# Patient Record
Sex: MALE | Race: WHITE | Employment: OTHER | URBAN - METROPOLITAN AREA
[De-identification: names, ages, dates, MRNs, and addresses within clinical notes are randomized per-mention and may not be internally consistent; named-entity substitution may affect disease eponyms.]

---

## 2017-01-12 RX ORDER — LISINOPRIL 10 MG/1
10 TABLET ORAL DAILY
Qty: 90 TABLET | Refills: 0 | Status: SHIPPED | OUTPATIENT
Start: 2017-01-12 | End: 2017-04-20 | Stop reason: SDUPTHER

## 2017-03-10 ENCOUNTER — NURSE ONLY (OUTPATIENT)
Dept: FAMILY MEDICINE CLINIC | Age: 67
End: 2017-03-10

## 2017-03-10 ENCOUNTER — OFFICE VISIT (OUTPATIENT)
Dept: FAMILY MEDICINE CLINIC | Age: 67
End: 2017-03-10

## 2017-03-10 VITALS
WEIGHT: 262 LBS | DIASTOLIC BLOOD PRESSURE: 88 MMHG | SYSTOLIC BLOOD PRESSURE: 124 MMHG | BODY MASS INDEX: 33.64 KG/M2 | OXYGEN SATURATION: 98 % | HEART RATE: 68 BPM

## 2017-03-10 DIAGNOSIS — M25.311 ROTATOR CUFF INSUFFICIENCY OF RIGHT SHOULDER: Primary | ICD-10-CM

## 2017-03-10 DIAGNOSIS — R05.9 COUGH: ICD-10-CM

## 2017-03-10 PROCEDURE — 4040F PNEUMOC VAC/ADMIN/RCVD: CPT | Performed by: FAMILY MEDICINE

## 2017-03-10 PROCEDURE — 1123F ACP DISCUSS/DSCN MKR DOCD: CPT | Performed by: FAMILY MEDICINE

## 2017-03-10 PROCEDURE — G8417 CALC BMI ABV UP PARAM F/U: HCPCS | Performed by: FAMILY MEDICINE

## 2017-03-10 PROCEDURE — 3017F COLORECTAL CA SCREEN DOC REV: CPT | Performed by: FAMILY MEDICINE

## 2017-03-10 PROCEDURE — G8427 DOCREV CUR MEDS BY ELIG CLIN: HCPCS | Performed by: FAMILY MEDICINE

## 2017-03-10 PROCEDURE — 99214 OFFICE O/P EST MOD 30 MIN: CPT | Performed by: FAMILY MEDICINE

## 2017-03-10 PROCEDURE — 71020 XR CHEST STANDARD TWO VW: CPT | Performed by: FAMILY MEDICINE

## 2017-03-10 PROCEDURE — G8484 FLU IMMUNIZE NO ADMIN: HCPCS | Performed by: FAMILY MEDICINE

## 2017-03-10 PROCEDURE — 1036F TOBACCO NON-USER: CPT | Performed by: FAMILY MEDICINE

## 2017-03-10 RX ORDER — OMEPRAZOLE 40 MG/1
40 CAPSULE, DELAYED RELEASE ORAL DAILY
Qty: 30 CAPSULE | Refills: 3 | Status: SHIPPED | OUTPATIENT
Start: 2017-03-10 | End: 2017-03-30 | Stop reason: ALTCHOICE

## 2017-03-11 ASSESSMENT — ENCOUNTER SYMPTOMS
EYES NEGATIVE: 1
CHEST TIGHTNESS: 0
SHORTNESS OF BREATH: 0
APNEA: 0
GASTROINTESTINAL NEGATIVE: 1
WHEEZING: 0
CHOKING: 0
COUGH: 1
STRIDOR: 0

## 2017-03-30 ENCOUNTER — OFFICE VISIT (OUTPATIENT)
Dept: ORTHOPEDIC SURGERY | Age: 67
End: 2017-03-30

## 2017-03-30 VITALS
SYSTOLIC BLOOD PRESSURE: 124 MMHG | HEART RATE: 78 BPM | HEIGHT: 74 IN | WEIGHT: 254 LBS | BODY MASS INDEX: 32.6 KG/M2 | DIASTOLIC BLOOD PRESSURE: 82 MMHG

## 2017-03-30 DIAGNOSIS — M75.121 COMPLETE TEAR OF RIGHT ROTATOR CUFF: ICD-10-CM

## 2017-03-30 DIAGNOSIS — S49.91XA RIGHT SHOULDER INJURY, INITIAL ENCOUNTER: Primary | ICD-10-CM

## 2017-03-30 PROCEDURE — 3017F COLORECTAL CA SCREEN DOC REV: CPT | Performed by: ORTHOPAEDIC SURGERY

## 2017-03-30 PROCEDURE — G8427 DOCREV CUR MEDS BY ELIG CLIN: HCPCS | Performed by: ORTHOPAEDIC SURGERY

## 2017-03-30 PROCEDURE — 1123F ACP DISCUSS/DSCN MKR DOCD: CPT | Performed by: ORTHOPAEDIC SURGERY

## 2017-03-30 PROCEDURE — 73030 X-RAY EXAM OF SHOULDER: CPT | Performed by: ORTHOPAEDIC SURGERY

## 2017-03-30 PROCEDURE — G8417 CALC BMI ABV UP PARAM F/U: HCPCS | Performed by: ORTHOPAEDIC SURGERY

## 2017-03-30 PROCEDURE — 1036F TOBACCO NON-USER: CPT | Performed by: ORTHOPAEDIC SURGERY

## 2017-03-30 PROCEDURE — G8484 FLU IMMUNIZE NO ADMIN: HCPCS | Performed by: ORTHOPAEDIC SURGERY

## 2017-03-30 PROCEDURE — 4040F PNEUMOC VAC/ADMIN/RCVD: CPT | Performed by: ORTHOPAEDIC SURGERY

## 2017-03-30 PROCEDURE — 99203 OFFICE O/P NEW LOW 30 MIN: CPT | Performed by: ORTHOPAEDIC SURGERY

## 2017-04-03 RX ORDER — ATORVASTATIN CALCIUM 20 MG/1
TABLET, FILM COATED ORAL
Qty: 90 TABLET | Refills: 0 | Status: SHIPPED | OUTPATIENT
Start: 2017-04-03 | End: 2017-06-27 | Stop reason: SDUPTHER

## 2017-04-06 ENCOUNTER — OFFICE VISIT (OUTPATIENT)
Dept: ORTHOPEDIC SURGERY | Age: 67
End: 2017-04-06

## 2017-04-06 VITALS
HEART RATE: 83 BPM | BODY MASS INDEX: 32.59 KG/M2 | DIASTOLIC BLOOD PRESSURE: 83 MMHG | HEIGHT: 74 IN | SYSTOLIC BLOOD PRESSURE: 116 MMHG | WEIGHT: 253.97 LBS

## 2017-04-06 DIAGNOSIS — M75.121 COMPLETE TEAR OF RIGHT ROTATOR CUFF: Primary | ICD-10-CM

## 2017-04-06 PROCEDURE — G8427 DOCREV CUR MEDS BY ELIG CLIN: HCPCS | Performed by: ORTHOPAEDIC SURGERY

## 2017-04-06 PROCEDURE — 99213 OFFICE O/P EST LOW 20 MIN: CPT | Performed by: ORTHOPAEDIC SURGERY

## 2017-04-06 PROCEDURE — G8417 CALC BMI ABV UP PARAM F/U: HCPCS | Performed by: ORTHOPAEDIC SURGERY

## 2017-04-06 PROCEDURE — 1036F TOBACCO NON-USER: CPT | Performed by: ORTHOPAEDIC SURGERY

## 2017-04-06 PROCEDURE — 4040F PNEUMOC VAC/ADMIN/RCVD: CPT | Performed by: ORTHOPAEDIC SURGERY

## 2017-04-06 PROCEDURE — 3017F COLORECTAL CA SCREEN DOC REV: CPT | Performed by: ORTHOPAEDIC SURGERY

## 2017-04-06 PROCEDURE — 1123F ACP DISCUSS/DSCN MKR DOCD: CPT | Performed by: ORTHOPAEDIC SURGERY

## 2017-04-20 RX ORDER — LISINOPRIL 10 MG/1
TABLET ORAL
Qty: 90 TABLET | Refills: 0 | Status: SHIPPED | OUTPATIENT
Start: 2017-04-20 | End: 2017-07-16 | Stop reason: SDUPTHER

## 2017-04-24 ENCOUNTER — HOSPITAL ENCOUNTER (OUTPATIENT)
Dept: PHYSICAL THERAPY | Age: 67
Discharge: HOME | End: 2017-04-30
Admitting: ORTHOPAEDIC SURGERY

## 2017-04-26 ENCOUNTER — HOSPITAL ENCOUNTER (OUTPATIENT)
Dept: PHYSICAL THERAPY | Age: 67
Discharge: HOME | End: 2017-04-27
Admitting: ORTHOPAEDIC SURGERY

## 2017-05-01 ENCOUNTER — HOSPITAL ENCOUNTER (OUTPATIENT)
Dept: PHYSICAL THERAPY | Age: 67
Discharge: HOME | End: 2017-05-01
Admitting: ORTHOPAEDIC SURGERY

## 2017-05-03 ENCOUNTER — HOSPITAL ENCOUNTER (OUTPATIENT)
Dept: PHYSICAL THERAPY | Age: 67
Discharge: HOME | End: 2017-05-03
Admitting: ORTHOPAEDIC SURGERY

## 2017-05-11 ENCOUNTER — HOSPITAL ENCOUNTER (OUTPATIENT)
Dept: PHYSICAL THERAPY | Age: 67
Discharge: HOME | End: 2017-05-11
Admitting: ORTHOPAEDIC SURGERY

## 2017-05-15 ENCOUNTER — HOSPITAL ENCOUNTER (OUTPATIENT)
Dept: PHYSICAL THERAPY | Age: 67
Discharge: HOME | End: 2017-05-15
Admitting: ORTHOPAEDIC SURGERY

## 2017-05-17 ENCOUNTER — OFFICE VISIT (OUTPATIENT)
Dept: ORTHOPEDIC SURGERY | Age: 67
End: 2017-05-17

## 2017-05-17 VITALS
SYSTOLIC BLOOD PRESSURE: 132 MMHG | HEART RATE: 80 BPM | WEIGHT: 253 LBS | DIASTOLIC BLOOD PRESSURE: 92 MMHG | BODY MASS INDEX: 32.47 KG/M2 | HEIGHT: 74 IN

## 2017-05-17 DIAGNOSIS — M75.111 INCOMPLETE TEAR OF RIGHT ROTATOR CUFF: Primary | ICD-10-CM

## 2017-05-17 PROCEDURE — 1123F ACP DISCUSS/DSCN MKR DOCD: CPT | Performed by: ORTHOPAEDIC SURGERY

## 2017-05-17 PROCEDURE — 4040F PNEUMOC VAC/ADMIN/RCVD: CPT | Performed by: ORTHOPAEDIC SURGERY

## 2017-05-17 PROCEDURE — 1036F TOBACCO NON-USER: CPT | Performed by: ORTHOPAEDIC SURGERY

## 2017-05-17 PROCEDURE — G8427 DOCREV CUR MEDS BY ELIG CLIN: HCPCS | Performed by: ORTHOPAEDIC SURGERY

## 2017-05-17 PROCEDURE — G8417 CALC BMI ABV UP PARAM F/U: HCPCS | Performed by: ORTHOPAEDIC SURGERY

## 2017-05-17 PROCEDURE — 99213 OFFICE O/P EST LOW 20 MIN: CPT | Performed by: ORTHOPAEDIC SURGERY

## 2017-05-17 PROCEDURE — 3017F COLORECTAL CA SCREEN DOC REV: CPT | Performed by: ORTHOPAEDIC SURGERY

## 2017-06-27 ENCOUNTER — TELEPHONE (OUTPATIENT)
Dept: FAMILY MEDICINE CLINIC | Age: 67
End: 2017-06-27

## 2017-06-27 RX ORDER — ATORVASTATIN CALCIUM 20 MG/1
20 TABLET, FILM COATED ORAL DAILY
Qty: 90 TABLET | Refills: 0 | Status: SHIPPED | OUTPATIENT
Start: 2017-06-27 | End: 2017-10-04 | Stop reason: SDUPTHER

## 2017-07-17 RX ORDER — LISINOPRIL 10 MG/1
TABLET ORAL
Qty: 90 TABLET | Refills: 0 | Status: SHIPPED | OUTPATIENT
Start: 2017-07-17 | End: 2017-10-21 | Stop reason: SDUPTHER

## 2017-08-15 ENCOUNTER — OFFICE VISIT (OUTPATIENT)
Dept: FAMILY MEDICINE CLINIC | Age: 67
End: 2017-08-15

## 2017-08-15 VITALS
BODY MASS INDEX: 30.17 KG/M2 | WEIGHT: 235 LBS | SYSTOLIC BLOOD PRESSURE: 142 MMHG | HEART RATE: 70 BPM | DIASTOLIC BLOOD PRESSURE: 86 MMHG

## 2017-08-15 DIAGNOSIS — I10 ESSENTIAL HYPERTENSION: Primary | Chronic | ICD-10-CM

## 2017-08-15 DIAGNOSIS — G47.33 OBSTRUCTIVE SLEEP APNEA SYNDROME: Chronic | ICD-10-CM

## 2017-08-15 DIAGNOSIS — E78.00 HYPERCHOLESTEROLEMIA: Chronic | ICD-10-CM

## 2017-08-15 PROCEDURE — 99213 OFFICE O/P EST LOW 20 MIN: CPT | Performed by: FAMILY MEDICINE

## 2017-08-15 PROCEDURE — 4040F PNEUMOC VAC/ADMIN/RCVD: CPT | Performed by: FAMILY MEDICINE

## 2017-08-15 PROCEDURE — 1036F TOBACCO NON-USER: CPT | Performed by: FAMILY MEDICINE

## 2017-08-15 PROCEDURE — 1123F ACP DISCUSS/DSCN MKR DOCD: CPT | Performed by: FAMILY MEDICINE

## 2017-08-15 PROCEDURE — 3017F COLORECTAL CA SCREEN DOC REV: CPT | Performed by: FAMILY MEDICINE

## 2017-08-15 PROCEDURE — G8427 DOCREV CUR MEDS BY ELIG CLIN: HCPCS | Performed by: FAMILY MEDICINE

## 2017-08-15 PROCEDURE — G8417 CALC BMI ABV UP PARAM F/U: HCPCS | Performed by: FAMILY MEDICINE

## 2017-08-16 LAB
ALBUMIN SERPL-MCNC: 4.2 G/DL (ref 3.4–5)
ALP BLD-CCNC: 42 U/L (ref 40–129)
ALT SERPL-CCNC: 9 U/L (ref 10–40)
ANION GAP SERPL CALCULATED.3IONS-SCNC: 12 MMOL/L (ref 3–16)
AST SERPL-CCNC: 14 U/L (ref 15–37)
BILIRUB SERPL-MCNC: 0.9 MG/DL (ref 0–1)
BILIRUBIN DIRECT: <0.2 MG/DL (ref 0–0.3)
BILIRUBIN, INDIRECT: ABNORMAL MG/DL (ref 0–1)
BUN BLDV-MCNC: 19 MG/DL (ref 7–20)
CALCIUM SERPL-MCNC: 9.5 MG/DL (ref 8.3–10.6)
CHLORIDE BLD-SCNC: 102 MMOL/L (ref 99–110)
CHOLESTEROL, TOTAL: 127 MG/DL (ref 0–199)
CO2: 24 MMOL/L (ref 21–32)
CREAT SERPL-MCNC: 0.9 MG/DL (ref 0.8–1.3)
GFR AFRICAN AMERICAN: >60
GFR NON-AFRICAN AMERICAN: >60
GLUCOSE BLD-MCNC: 89 MG/DL (ref 70–99)
HCT VFR BLD CALC: 44.7 % (ref 40.5–52.5)
HDLC SERPL-MCNC: 61 MG/DL (ref 40–60)
HEMOGLOBIN: 14.7 G/DL (ref 13.5–17.5)
LDL CHOLESTEROL CALCULATED: 56 MG/DL
MCH RBC QN AUTO: 29.8 PG (ref 26–34)
MCHC RBC AUTO-ENTMCNC: 32.9 G/DL (ref 31–36)
MCV RBC AUTO: 90.6 FL (ref 80–100)
PDW BLD-RTO: 14.1 % (ref 12.4–15.4)
PLATELET # BLD: 240 K/UL (ref 135–450)
PMV BLD AUTO: 7.9 FL (ref 5–10.5)
POTASSIUM SERPL-SCNC: 4.3 MMOL/L (ref 3.5–5.1)
RBC # BLD: 4.94 M/UL (ref 4.2–5.9)
SODIUM BLD-SCNC: 138 MMOL/L (ref 136–145)
TOTAL PROTEIN: 6.8 G/DL (ref 6.4–8.2)
TRIGL SERPL-MCNC: 51 MG/DL (ref 0–150)
TSH SERPL DL<=0.05 MIU/L-ACNC: 2.21 UIU/ML (ref 0.27–4.2)
VLDLC SERPL CALC-MCNC: 10 MG/DL
WBC # BLD: 6.1 K/UL (ref 4–11)

## 2017-09-15 ENCOUNTER — OFFICE VISIT (OUTPATIENT)
Dept: FAMILY MEDICINE CLINIC | Age: 67
End: 2017-09-15

## 2017-09-15 VITALS
SYSTOLIC BLOOD PRESSURE: 142 MMHG | WEIGHT: 231.2 LBS | BODY MASS INDEX: 29.68 KG/M2 | DIASTOLIC BLOOD PRESSURE: 100 MMHG | TEMPERATURE: 97.6 F

## 2017-09-15 DIAGNOSIS — I10 ESSENTIAL HYPERTENSION: Primary | ICD-10-CM

## 2017-09-15 DIAGNOSIS — H92.01 EARACHE ON RIGHT: ICD-10-CM

## 2017-09-15 PROCEDURE — 99213 OFFICE O/P EST LOW 20 MIN: CPT | Performed by: FAMILY MEDICINE

## 2017-09-15 PROCEDURE — 1123F ACP DISCUSS/DSCN MKR DOCD: CPT | Performed by: FAMILY MEDICINE

## 2017-09-15 PROCEDURE — 1036F TOBACCO NON-USER: CPT | Performed by: FAMILY MEDICINE

## 2017-09-15 PROCEDURE — G8427 DOCREV CUR MEDS BY ELIG CLIN: HCPCS | Performed by: FAMILY MEDICINE

## 2017-09-15 PROCEDURE — 4040F PNEUMOC VAC/ADMIN/RCVD: CPT | Performed by: FAMILY MEDICINE

## 2017-09-15 PROCEDURE — 3017F COLORECTAL CA SCREEN DOC REV: CPT | Performed by: FAMILY MEDICINE

## 2017-09-15 PROCEDURE — G8417 CALC BMI ABV UP PARAM F/U: HCPCS | Performed by: FAMILY MEDICINE

## 2017-09-15 ASSESSMENT — PATIENT HEALTH QUESTIONNAIRE - PHQ9
1. LITTLE INTEREST OR PLEASURE IN DOING THINGS: 0
SUM OF ALL RESPONSES TO PHQ QUESTIONS 1-9: 0
SUM OF ALL RESPONSES TO PHQ9 QUESTIONS 1 & 2: 0
2. FEELING DOWN, DEPRESSED OR HOPELESS: 0

## 2017-09-15 ASSESSMENT — ENCOUNTER SYMPTOMS
EYES NEGATIVE: 1
APNEA: 1

## 2017-10-05 RX ORDER — ATORVASTATIN CALCIUM 20 MG/1
TABLET, FILM COATED ORAL
Qty: 90 TABLET | Refills: 0 | Status: SHIPPED | OUTPATIENT
Start: 2017-10-05 | End: 2018-01-04 | Stop reason: SDUPTHER

## 2017-10-23 RX ORDER — LISINOPRIL 10 MG/1
TABLET ORAL
Qty: 90 TABLET | Refills: 0 | Status: SHIPPED | OUTPATIENT
Start: 2017-10-23 | End: 2018-01-09 | Stop reason: SDUPTHER

## 2018-01-04 RX ORDER — ATORVASTATIN CALCIUM 20 MG/1
TABLET, FILM COATED ORAL
Qty: 90 TABLET | Refills: 2 | Status: SHIPPED | OUTPATIENT
Start: 2018-01-04 | End: 2018-09-25 | Stop reason: SDUPTHER

## 2018-01-09 RX ORDER — LISINOPRIL 10 MG/1
TABLET ORAL
Qty: 90 TABLET | Refills: 0 | Status: SHIPPED | OUTPATIENT
Start: 2018-01-09 | End: 2018-04-16 | Stop reason: SDUPTHER

## 2018-03-29 ENCOUNTER — NURSE ONLY (OUTPATIENT)
Dept: FAMILY MEDICINE CLINIC | Age: 68
End: 2018-03-29

## 2018-03-29 DIAGNOSIS — Z23 NEED FOR PNEUMOCOCCAL VACCINATION: Primary | ICD-10-CM

## 2018-03-29 PROCEDURE — 90670 PCV13 VACCINE IM: CPT | Performed by: FAMILY MEDICINE

## 2018-03-29 PROCEDURE — G0009 ADMIN PNEUMOCOCCAL VACCINE: HCPCS | Performed by: FAMILY MEDICINE

## 2018-04-19 ENCOUNTER — OFFICE VISIT (OUTPATIENT)
Dept: FAMILY MEDICINE CLINIC | Age: 68
End: 2018-04-19

## 2018-04-19 VITALS
HEART RATE: 77 BPM | WEIGHT: 240 LBS | BODY MASS INDEX: 30.81 KG/M2 | SYSTOLIC BLOOD PRESSURE: 130 MMHG | DIASTOLIC BLOOD PRESSURE: 90 MMHG

## 2018-04-19 DIAGNOSIS — I10 ESSENTIAL HYPERTENSION: Primary | Chronic | ICD-10-CM

## 2018-04-19 DIAGNOSIS — E78.00 HYPERCHOLESTEROLEMIA: Chronic | ICD-10-CM

## 2018-04-19 PROCEDURE — G8417 CALC BMI ABV UP PARAM F/U: HCPCS | Performed by: FAMILY MEDICINE

## 2018-04-19 PROCEDURE — 1123F ACP DISCUSS/DSCN MKR DOCD: CPT | Performed by: FAMILY MEDICINE

## 2018-04-19 PROCEDURE — 99213 OFFICE O/P EST LOW 20 MIN: CPT | Performed by: FAMILY MEDICINE

## 2018-04-19 PROCEDURE — 3017F COLORECTAL CA SCREEN DOC REV: CPT | Performed by: FAMILY MEDICINE

## 2018-04-19 PROCEDURE — G8427 DOCREV CUR MEDS BY ELIG CLIN: HCPCS | Performed by: FAMILY MEDICINE

## 2018-04-19 PROCEDURE — 4040F PNEUMOC VAC/ADMIN/RCVD: CPT | Performed by: FAMILY MEDICINE

## 2018-04-19 PROCEDURE — 1036F TOBACCO NON-USER: CPT | Performed by: FAMILY MEDICINE

## 2018-04-26 DIAGNOSIS — E78.00 HYPERCHOLESTEROLEMIA: Chronic | ICD-10-CM

## 2018-04-26 DIAGNOSIS — I10 ESSENTIAL HYPERTENSION: Chronic | ICD-10-CM

## 2018-04-26 LAB
ALBUMIN SERPL-MCNC: 4.1 G/DL (ref 3.4–5)
ALP BLD-CCNC: 50 U/L (ref 40–129)
ALT SERPL-CCNC: 10 U/L (ref 10–40)
ANION GAP SERPL CALCULATED.3IONS-SCNC: 13 MMOL/L (ref 3–16)
AST SERPL-CCNC: 19 U/L (ref 15–37)
BILIRUB SERPL-MCNC: 1 MG/DL (ref 0–1)
BILIRUBIN DIRECT: <0.2 MG/DL (ref 0–0.3)
BILIRUBIN, INDIRECT: NORMAL MG/DL (ref 0–1)
BUN BLDV-MCNC: 21 MG/DL (ref 7–20)
CALCIUM SERPL-MCNC: 9.5 MG/DL (ref 8.3–10.6)
CHLORIDE BLD-SCNC: 103 MMOL/L (ref 99–110)
CHOLESTEROL, TOTAL: 139 MG/DL (ref 0–199)
CO2: 26 MMOL/L (ref 21–32)
CREAT SERPL-MCNC: 0.8 MG/DL (ref 0.8–1.3)
GFR AFRICAN AMERICAN: >60
GFR NON-AFRICAN AMERICAN: >60
GLUCOSE BLD-MCNC: 91 MG/DL (ref 70–99)
HCT VFR BLD CALC: 42.5 % (ref 40.5–52.5)
HDLC SERPL-MCNC: 64 MG/DL (ref 40–60)
HEMOGLOBIN: 14.4 G/DL (ref 13.5–17.5)
LDL CHOLESTEROL CALCULATED: 60 MG/DL
MCH RBC QN AUTO: 30.2 PG (ref 26–34)
MCHC RBC AUTO-ENTMCNC: 33.9 G/DL (ref 31–36)
MCV RBC AUTO: 89.1 FL (ref 80–100)
PDW BLD-RTO: 14.6 % (ref 12.4–15.4)
PLATELET # BLD: 273 K/UL (ref 135–450)
PMV BLD AUTO: 7.6 FL (ref 5–10.5)
POTASSIUM SERPL-SCNC: 5.1 MMOL/L (ref 3.5–5.1)
RBC # BLD: 4.77 M/UL (ref 4.2–5.9)
SODIUM BLD-SCNC: 142 MMOL/L (ref 136–145)
TOTAL CK: 120 U/L (ref 39–308)
TOTAL PROTEIN: 6.7 G/DL (ref 6.4–8.2)
TRIGL SERPL-MCNC: 75 MG/DL (ref 0–150)
TSH SERPL DL<=0.05 MIU/L-ACNC: 1.31 UIU/ML (ref 0.27–4.2)
VLDLC SERPL CALC-MCNC: 15 MG/DL
WBC # BLD: 6.9 K/UL (ref 4–11)

## 2018-07-09 RX ORDER — LISINOPRIL 10 MG/1
TABLET ORAL
Qty: 90 TABLET | Refills: 0 | Status: SHIPPED | OUTPATIENT
Start: 2018-07-09 | End: 2018-10-22 | Stop reason: SDUPTHER

## 2018-07-09 NOTE — TELEPHONE ENCOUNTER
Medication:   Requested Prescriptions     Pending Prescriptions Disp Refills    lisinopril (PRINIVIL;ZESTRIL) 10 MG tablet [Pharmacy Med Name: LISINOPRIL 10 MG TABLET] 90 tablet 0     Sig: TAKE ONE TABLET BY MOUTH DAILY         Last appt: 4/19/2018   Next appt: Visit date not found

## 2018-09-25 RX ORDER — ATORVASTATIN CALCIUM 20 MG/1
TABLET, FILM COATED ORAL
Qty: 90 TABLET | Refills: 1 | Status: SHIPPED | OUTPATIENT
Start: 2018-09-25 | End: 2019-04-11 | Stop reason: SDUPTHER

## 2018-10-22 RX ORDER — LISINOPRIL 10 MG/1
TABLET ORAL
Qty: 90 TABLET | Refills: 0 | Status: SHIPPED | OUTPATIENT
Start: 2018-10-22 | End: 2019-01-22 | Stop reason: SDUPTHER

## 2018-11-14 ENCOUNTER — OFFICE VISIT (OUTPATIENT)
Dept: FAMILY MEDICINE CLINIC | Age: 68
End: 2018-11-14
Payer: MEDICARE

## 2018-11-14 VITALS
BODY MASS INDEX: 31.2 KG/M2 | TEMPERATURE: 98.4 F | WEIGHT: 243 LBS | SYSTOLIC BLOOD PRESSURE: 148 MMHG | DIASTOLIC BLOOD PRESSURE: 92 MMHG

## 2018-11-14 DIAGNOSIS — J40 BRONCHITIS: Primary | ICD-10-CM

## 2018-11-14 DIAGNOSIS — I10 ESSENTIAL HYPERTENSION: Chronic | ICD-10-CM

## 2018-11-14 PROCEDURE — 3017F COLORECTAL CA SCREEN DOC REV: CPT | Performed by: FAMILY MEDICINE

## 2018-11-14 PROCEDURE — G8427 DOCREV CUR MEDS BY ELIG CLIN: HCPCS | Performed by: FAMILY MEDICINE

## 2018-11-14 PROCEDURE — G8417 CALC BMI ABV UP PARAM F/U: HCPCS | Performed by: FAMILY MEDICINE

## 2018-11-14 PROCEDURE — 1101F PT FALLS ASSESS-DOCD LE1/YR: CPT | Performed by: FAMILY MEDICINE

## 2018-11-14 PROCEDURE — 1036F TOBACCO NON-USER: CPT | Performed by: FAMILY MEDICINE

## 2018-11-14 PROCEDURE — 99213 OFFICE O/P EST LOW 20 MIN: CPT | Performed by: FAMILY MEDICINE

## 2018-11-14 PROCEDURE — G8484 FLU IMMUNIZE NO ADMIN: HCPCS | Performed by: FAMILY MEDICINE

## 2018-11-14 PROCEDURE — 1123F ACP DISCUSS/DSCN MKR DOCD: CPT | Performed by: FAMILY MEDICINE

## 2018-11-14 PROCEDURE — 4040F PNEUMOC VAC/ADMIN/RCVD: CPT | Performed by: FAMILY MEDICINE

## 2018-11-14 RX ORDER — AMOXICILLIN AND CLAVULANATE POTASSIUM 875; 125 MG/1; MG/1
1 TABLET, FILM COATED ORAL 2 TIMES DAILY
Qty: 20 TABLET | Refills: 0 | Status: SHIPPED | OUTPATIENT
Start: 2018-11-14 | End: 2018-11-24

## 2018-11-14 ASSESSMENT — ENCOUNTER SYMPTOMS
SHORTNESS OF BREATH: 0
WHEEZING: 0
CHEST TIGHTNESS: 1

## 2018-11-14 ASSESSMENT — PATIENT HEALTH QUESTIONNAIRE - PHQ9
SUM OF ALL RESPONSES TO PHQ QUESTIONS 1-9: 0
SUM OF ALL RESPONSES TO PHQ9 QUESTIONS 1 & 2: 0
2. FEELING DOWN, DEPRESSED OR HOPELESS: 0
1. LITTLE INTEREST OR PLEASURE IN DOING THINGS: 0
SUM OF ALL RESPONSES TO PHQ QUESTIONS 1-9: 0

## 2019-01-22 RX ORDER — LISINOPRIL 10 MG/1
TABLET ORAL
Qty: 90 TABLET | Refills: 0 | Status: SHIPPED | OUTPATIENT
Start: 2019-01-22 | End: 2019-04-21 | Stop reason: SDUPTHER

## 2019-04-11 NOTE — TELEPHONE ENCOUNTER
Medication:   Requested Prescriptions     Pending Prescriptions Disp Refills    atorvastatin (LIPITOR) 20 MG tablet [Pharmacy Med Name: ATORVASTATIN 20 MG TABLET] 90 tablet 0     Sig: TAKE ONE TABLET BY MOUTH DAILY     Last Filled:  9/25/18    Last appt: 4/19/2018   Next appt: Visit date not found    Last Labs DM: No results found for: LABA1C  Last Lipid:   Lab Results   Component Value Date    CHOL 139 04/26/2018    TRIG 75 04/26/2018    HDL 64 04/26/2018    LDLCALC 60 04/26/2018     Last PSA:   Lab Results   Component Value Date    PSA 1.01 03/16/2015     Last Thyroid:   Lab Results   Component Value Date    TSH 1.31 04/26/2018

## 2019-04-12 RX ORDER — ATORVASTATIN CALCIUM 20 MG/1
TABLET, FILM COATED ORAL
Qty: 90 TABLET | Refills: 0 | Status: SHIPPED | OUTPATIENT
Start: 2019-04-12 | End: 2019-07-15 | Stop reason: SDUPTHER

## 2019-04-22 RX ORDER — LISINOPRIL 10 MG/1
TABLET ORAL
Qty: 90 TABLET | Refills: 0 | Status: SHIPPED | OUTPATIENT
Start: 2019-04-22 | End: 2019-07-23 | Stop reason: SDUPTHER

## 2019-04-23 ENCOUNTER — OFFICE VISIT (OUTPATIENT)
Dept: FAMILY MEDICINE CLINIC | Age: 69
End: 2019-04-23
Payer: MEDICARE

## 2019-04-23 VITALS
SYSTOLIC BLOOD PRESSURE: 132 MMHG | WEIGHT: 251.41 LBS | DIASTOLIC BLOOD PRESSURE: 74 MMHG | HEART RATE: 55 BPM | RESPIRATION RATE: 14 BRPM | OXYGEN SATURATION: 97 % | BODY MASS INDEX: 32.28 KG/M2

## 2019-04-23 DIAGNOSIS — Z23 NEED FOR PROPHYLACTIC VACCINATION AGAINST STREPTOCOCCUS PNEUMONIAE (PNEUMOCOCCUS): ICD-10-CM

## 2019-04-23 DIAGNOSIS — I10 ESSENTIAL HYPERTENSION: Primary | ICD-10-CM

## 2019-04-23 DIAGNOSIS — E78.00 HYPERCHOLESTEROLEMIA: ICD-10-CM

## 2019-04-23 DIAGNOSIS — G47.30 SLEEP APNEA, UNSPECIFIED TYPE: ICD-10-CM

## 2019-04-23 DIAGNOSIS — N40.0 BENIGN PROSTATIC HYPERPLASIA WITHOUT LOWER URINARY TRACT SYMPTOMS: ICD-10-CM

## 2019-04-23 DIAGNOSIS — Z23 NEED FOR PROPHYLACTIC VACCINATION AND INOCULATION AGAINST VARICELLA: ICD-10-CM

## 2019-04-23 DIAGNOSIS — Z80.42 FAMILY HX OF PROSTATE CANCER: ICD-10-CM

## 2019-04-23 PROCEDURE — 1123F ACP DISCUSS/DSCN MKR DOCD: CPT | Performed by: FAMILY MEDICINE

## 2019-04-23 PROCEDURE — 4040F PNEUMOC VAC/ADMIN/RCVD: CPT | Performed by: FAMILY MEDICINE

## 2019-04-23 PROCEDURE — 99214 OFFICE O/P EST MOD 30 MIN: CPT | Performed by: FAMILY MEDICINE

## 2019-04-23 PROCEDURE — 90732 PPSV23 VACC 2 YRS+ SUBQ/IM: CPT | Performed by: FAMILY MEDICINE

## 2019-04-23 PROCEDURE — G0009 ADMIN PNEUMOCOCCAL VACCINE: HCPCS | Performed by: FAMILY MEDICINE

## 2019-04-23 PROCEDURE — G8417 CALC BMI ABV UP PARAM F/U: HCPCS | Performed by: FAMILY MEDICINE

## 2019-04-23 PROCEDURE — 3017F COLORECTAL CA SCREEN DOC REV: CPT | Performed by: FAMILY MEDICINE

## 2019-04-23 PROCEDURE — 1036F TOBACCO NON-USER: CPT | Performed by: FAMILY MEDICINE

## 2019-04-23 PROCEDURE — G8427 DOCREV CUR MEDS BY ELIG CLIN: HCPCS | Performed by: FAMILY MEDICINE

## 2019-04-23 ASSESSMENT — PATIENT HEALTH QUESTIONNAIRE - PHQ9
1. LITTLE INTEREST OR PLEASURE IN DOING THINGS: 0
SUM OF ALL RESPONSES TO PHQ9 QUESTIONS 1 & 2: 0
SUM OF ALL RESPONSES TO PHQ QUESTIONS 1-9: 0
2. FEELING DOWN, DEPRESSED OR HOPELESS: 0
SUM OF ALL RESPONSES TO PHQ QUESTIONS 1-9: 0

## 2019-04-23 NOTE — PROGRESS NOTES
Subjective:      Patient ID: Yudi Harrington is a 80 y.o. male. HPI  Hypertension: Patient here for follow-up of elevated blood pressure. He is exercising and is adherent to low salt diet. Blood pressure is well controlled at home. Cardiac symptoms none. Patient denies chest pain, chest pressure/discomfort, claudication, dyspnea, exertional chest pressure/discomfort, fatigue, irregular heart beat, lower extremity edema, near-syncope, orthopnea, palpitations and paroxysmal nocturnal dyspnea. Cardiovascular risk factors: dyslipidemia, family history of premature cardiovascular disease, male gender and obesity (BMI >= 30 kg/m2). Use of agents associated with hypertension: none. History of target organ damage: none. Abnormal blood pressure reading today in the office he feels very well when he had a few pounds extra he wanted to wait and exercise and lose some of the weight before adjust his medication his blood pressure reading usually well controlled at home. Pt with history of sleep apnea doing well on CPAP. Lately his been having issue with insomnia not able to maintain his sleep, he feels like he lost his cpap is not staying, he hasn't seen his sleep physician for a while, he's also have a lot in his mind anxious nervous his brothers diagnosed with a cancer. Prostate cancer is very concerned about him so most PSA to be checked patient waking up through the night not often. History of hyperlipidemia on med's doing well   Review of Systems   Constitutional: Negative. HENT: Negative. All other systems reviewed and are negative.     Past Medical History:   Diagnosis Date    Colon polyps 7.26.06    x2    Hypertension     resolved with weight loss    Malaria 2011    joyce    Obstructive sleep apnea (adult) (pediatric)       Past Surgical History:   Procedure Laterality Date    CYST REMOVAL  1997    back x 2    LASIK  2001    SHOULDER ARTHROSCOPY      TYMPANOSTOMY TUBE PLACEMENT  2002     Family History Problem Relation Age of Onset    Heart Attack Father 76        smoker    Crohn's Disease Brother         healthy    Heart Attack Brother 50     Social History     Socioeconomic History    Marital status:      Spouse name: Jovan Arteaga Number of children: 3    Years of education: None    Highest education level: None   Occupational History    Occupation: retired     Comment: Missionary   Social Needs    Financial resource strain: None    Food insecurity:     Worry: None     Inability: None    Transportation needs:     Medical: None     Non-medical: None   Tobacco Use    Smoking status: Never Smoker    Smokeless tobacco: Never Used   Substance and Sexual Activity    Alcohol use: Yes     Alcohol/week: 0.0 oz     Types: 3 Glasses of wine per week     Comment: rare    Drug use: No    Sexual activity: Yes     Partners: Female     Comment: M ,4 kids 27 C,97,48,10 yo   Lifestyle    Physical activity:     Days per week: None     Minutes per session: None    Stress: None   Relationships    Social connections:     Talks on phone: None     Gets together: None     Attends Advent service: None     Active member of club or organization: None     Attends meetings of clubs or organizations: None     Relationship status: None    Intimate partner violence:     Fear of current or ex partner: None     Emotionally abused: None     Physically abused: None     Forced sexual activity: None   Other Topics Concern    None   Social History Narrative    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        No current facility-administered medications for this

## 2019-04-23 NOTE — PATIENT INSTRUCTIONS
Patient Education        Insomnia: Care Instructions  Your Care Instructions    Insomnia is the inability to sleep well. It is a common problem for most people at some time. Insomnia may make it hard for you to get to sleep, stay asleep, or sleep as long as you need to. This can make you tired and grouchy during the day. It can also make you forgetful, less effective at work, and unhappy. Insomnia can be caused by conditions such as depression or anxiety. Pain can also affect your ability to sleep. When these problems are solved, the insomnia usually clears up. But sometimes bad sleep habits can cause insomnia. If insomnia is affecting your work or your enjoyment of life, you can take steps to improve your sleep. Follow-up care is a key part of your treatment and safety. Be sure to make and go to all appointments, and call your doctor if you are having problems. It's also a good idea to know your test results and keep a list of the medicines you take. How can you care for yourself at home? What to avoid  · Do not have drinks with caffeine, such as coffee or black tea, for 8 hours before bed. · Do not smoke or use other types of tobacco near bedtime. Nicotine is a stimulant and can keep you awake. · Avoid drinking alcohol late in the evening, because it can cause you to wake in the middle of the night. · Do not eat a big meal close to bedtime. If you are hungry, eat a light snack. · Do not drink a lot of water close to bedtime, because the need to urinate may wake you up during the night. · Do not read or watch TV in bed. Use the bed only for sleeping and sexual activity. What to try  · Go to bed at the same time every night, and wake up at the same time every morning. Do not take naps during the day. · Keep your bedroom quiet, dark, and cool. · Sleep on a comfortable pillow and mattress. · If watching the clock makes you anxious, turn it facing away from you so you cannot see the time.   · If you worry when you lie down, start a worry book. Well before bedtime, write down your worries, and then set the book and your concerns aside. · Try meditation or other relaxation techniques before you go to bed. · If you cannot fall asleep, get up and go to another room until you feel sleepy. Do something relaxing. Repeat your bedtime routine before you go to bed again. · Make your house quiet and calm about an hour before bedtime. Turn down the lights, turn off the TV, log off the computer, and turn down the volume on music. This can help you relax after a busy day. When should you call for help? Watch closely for changes in your health, and be sure to contact your doctor if:    · Your efforts to improve your sleep do not work.     · Your insomnia gets worse.     · You have been feeling down, depressed, or hopeless or have lost interest in things that you usually enjoy. Where can you learn more? Go to https://lingoking GmbHpeDlyte.com.Travel Beauty. org and sign in to your Intronis account. Enter P513 in the [employer] box to learn more about \"Insomnia: Care Instructions. \"     If you do not have an account, please click on the \"Sign Up Now\" link. Current as of: June 28, 2018  Content Version: 11.9  © 0675-9248 [employer]. Care instructions adapted under license by [facility] ([facility]). If you have questions about a medical condition or this instruction, always ask your healthcare professional. [place] [address] any warranty or liability for your use of this information. Patient Education        Learning About Sleeping Well  What does sleeping well mean? Sleeping well means getting enough sleep. How much sleep is enough varies among people. The number of hours you sleep is not as important as how you feel when you wake up. If you do not feel refreshed, you probably need more sleep. Another sign of not getting enough sleep is feeling tired during the day.   The average total the same time every morning, even if you feel tired. What to avoid  · Limit caffeine (coffee, tea, caffeinated sodas) during the day, and don't have any for at least 4 to 6 hours before bedtime. · Don't drink alcohol before bedtime. Alcohol can cause you to wake up more often during the night. · Don't smoke or use tobacco, especially in the evening. Nicotine can keep you awake. · Don't take naps during the day, especially close to bedtime. · Don't lie in bed awake for too long. If you can't fall asleep, or if you wake up in the middle of the night and can't get back to sleep within 15 minutes or so, get out of bed and go to another room until you feel sleepy. · Don't take medicine right before bed that may keep you awake or make you feel hyper or energized. Your doctor can tell you if your medicine may do this and if you can take it earlier in the day. If you can't sleep  · Imagine yourself in a peaceful, pleasant scene. Focus on the details and feelings of being in a place that is relaxing. · Get up and do a quiet or boring activity until you feel sleepy. · Don't drink any liquids after 6 p.m. if you wake up often because you have to go to the bathroom. Where can you learn more? Go to https://[employer]thenas [employer]ymtec Systems. org and sign in to your Twitsale account. Enter B167 in the [facility] box to learn more about \"Learning About Sleeping Well. \"     If you do not have an account, please click on the \"Sign Up Now\" link. Current as of: September 11, 2018  Content Version: 11.9  © 5801-4759 [email or web address], [employer]. Care instructions adapted under license by [facility] [place] ([facility]). If you have questions about a medical condition or this instruction, always ask your healthcare professional. [place] [address] any warranty or liability for your use of this information.          Patient Education         Lack of Sleep (01:00)  Your health professional recommends that you watch this short online health video. Learn about some lifestyle changes that can help you sleep better. How to watch the video    Scan the QR code   OR Visit the website    https://hwi. se/r/Ubxk5i0yyuy2u   Current as of: June 28, 2018  Content Version: 11.9  © 6000-0090 [employer]. Care instructions adapted under license by [facility] ([facility]). If you have questions about a medical condition or this instruction, always ask your healthcare professional. [place] [address] any warranty or liability for your use of this information.

## 2019-04-24 DIAGNOSIS — I10 ESSENTIAL HYPERTENSION: ICD-10-CM

## 2019-04-24 DIAGNOSIS — N40.0 BENIGN PROSTATIC HYPERPLASIA WITHOUT LOWER URINARY TRACT SYMPTOMS: ICD-10-CM

## 2019-04-24 DIAGNOSIS — Z80.42 FAMILY HX OF PROSTATE CANCER: Primary | ICD-10-CM

## 2019-04-24 DIAGNOSIS — E78.00 HYPERCHOLESTEROLEMIA: ICD-10-CM

## 2019-04-24 DIAGNOSIS — Z80.42 FAMILY HX OF PROSTATE CANCER: ICD-10-CM

## 2019-04-24 LAB
ALBUMIN SERPL-MCNC: 4.2 G/DL (ref 3.4–5)
ALP BLD-CCNC: 41 U/L (ref 40–129)
ALT SERPL-CCNC: 8 U/L (ref 10–40)
ANION GAP SERPL CALCULATED.3IONS-SCNC: 12 MMOL/L (ref 3–16)
AST SERPL-CCNC: 16 U/L (ref 15–37)
BILIRUB SERPL-MCNC: 0.7 MG/DL (ref 0–1)
BILIRUBIN DIRECT: <0.2 MG/DL (ref 0–0.3)
BILIRUBIN, INDIRECT: ABNORMAL MG/DL (ref 0–1)
BUN BLDV-MCNC: 17 MG/DL (ref 7–20)
CALCIUM SERPL-MCNC: 9.4 MG/DL (ref 8.3–10.6)
CHLORIDE BLD-SCNC: 107 MMOL/L (ref 99–110)
CHOLESTEROL, TOTAL: 132 MG/DL (ref 0–199)
CO2: 26 MMOL/L (ref 21–32)
CREAT SERPL-MCNC: 0.8 MG/DL (ref 0.8–1.3)
GFR AFRICAN AMERICAN: >60
GFR NON-AFRICAN AMERICAN: >60
GLUCOSE BLD-MCNC: 104 MG/DL (ref 70–99)
HCT VFR BLD CALC: 44.8 % (ref 40.5–52.5)
HDLC SERPL-MCNC: 58 MG/DL (ref 40–60)
HEMOGLOBIN: 14.9 G/DL (ref 13.5–17.5)
LDL CHOLESTEROL CALCULATED: 63 MG/DL
MCH RBC QN AUTO: 30.1 PG (ref 26–34)
MCHC RBC AUTO-ENTMCNC: 33.2 G/DL (ref 31–36)
MCV RBC AUTO: 90.8 FL (ref 80–100)
PDW BLD-RTO: 14.4 % (ref 12.4–15.4)
PLATELET # BLD: 231 K/UL (ref 135–450)
PMV BLD AUTO: 7.6 FL (ref 5–10.5)
POTASSIUM SERPL-SCNC: 4.5 MMOL/L (ref 3.5–5.1)
PROSTATE SPECIFIC ANTIGEN: 0.88 NG/ML (ref 0–4)
RBC # BLD: 4.93 M/UL (ref 4.2–5.9)
SODIUM BLD-SCNC: 145 MMOL/L (ref 136–145)
TOTAL PROTEIN: 6.8 G/DL (ref 6.4–8.2)
TRIGL SERPL-MCNC: 56 MG/DL (ref 0–150)
TSH SERPL DL<=0.05 MIU/L-ACNC: 1.41 UIU/ML (ref 0.27–4.2)
VLDLC SERPL CALC-MCNC: 11 MG/DL
WBC # BLD: 6.9 K/UL (ref 4–11)

## 2019-04-24 PROCEDURE — 36415 COLL VENOUS BLD VENIPUNCTURE: CPT | Performed by: FAMILY MEDICINE

## 2019-05-02 ENCOUNTER — OFFICE VISIT (OUTPATIENT)
Dept: FAMILY MEDICINE CLINIC | Age: 69
End: 2019-05-02
Payer: MEDICARE

## 2019-05-02 VITALS
HEART RATE: 74 BPM | HEIGHT: 73 IN | BODY MASS INDEX: 33.4 KG/M2 | DIASTOLIC BLOOD PRESSURE: 74 MMHG | RESPIRATION RATE: 14 BRPM | SYSTOLIC BLOOD PRESSURE: 114 MMHG | OXYGEN SATURATION: 95 % | WEIGHT: 252 LBS

## 2019-05-02 DIAGNOSIS — Z00.00 ROUTINE GENERAL MEDICAL EXAMINATION AT A HEALTH CARE FACILITY: ICD-10-CM

## 2019-05-02 DIAGNOSIS — Z82.49 FAMILY HISTORY OF EARLY CAD: ICD-10-CM

## 2019-05-02 DIAGNOSIS — Z00.00 MEDICARE ANNUAL WELLNESS VISIT, INITIAL: Primary | ICD-10-CM

## 2019-05-02 PROCEDURE — 3017F COLORECTAL CA SCREEN DOC REV: CPT | Performed by: FAMILY MEDICINE

## 2019-05-02 PROCEDURE — 4040F PNEUMOC VAC/ADMIN/RCVD: CPT | Performed by: FAMILY MEDICINE

## 2019-05-02 PROCEDURE — G0438 PPPS, INITIAL VISIT: HCPCS | Performed by: FAMILY MEDICINE

## 2019-05-02 PROCEDURE — 93000 ELECTROCARDIOGRAM COMPLETE: CPT | Performed by: FAMILY MEDICINE

## 2019-05-02 PROCEDURE — 1123F ACP DISCUSS/DSCN MKR DOCD: CPT | Performed by: FAMILY MEDICINE

## 2019-05-02 ASSESSMENT — LIFESTYLE VARIABLES
HOW OFTEN DO YOU HAVE A DRINK CONTAINING ALCOHOL: 1
HOW OFTEN DO YOU HAVE SIX OR MORE DRINKS ON ONE OCCASION: 0
HOW OFTEN DO YOU HAVE SIX OR MORE DRINKS ON ONE OCCASION: 0
AUDIT-C TOTAL SCORE: 1
HOW MANY STANDARD DRINKS CONTAINING ALCOHOL DO YOU HAVE ON A TYPICAL DAY: 0
AUDIT-C TOTAL SCORE: 1
HOW MANY STANDARD DRINKS CONTAINING ALCOHOL DO YOU HAVE ON A TYPICAL DAY: 0
HOW OFTEN DO YOU HAVE A DRINK CONTAINING ALCOHOL: 1

## 2019-05-02 ASSESSMENT — PATIENT HEALTH QUESTIONNAIRE - PHQ9
SUM OF ALL RESPONSES TO PHQ QUESTIONS 1-9: 0

## 2019-05-02 ASSESSMENT — ANXIETY QUESTIONNAIRES
GAD7 TOTAL SCORE: 0
GAD7 TOTAL SCORE: 0

## 2019-05-02 NOTE — PROGRESS NOTES
Medicare Annual Wellness Visit  Name: Nelia Wallace Date: 2019   MRN: [id number] Sex: Male   Age: 80 y.o. Ethnicity: Non-/Non    : 1950 Race: Puja Eng is here for Medicare AWV (labs )    Screenings for behavioral, psychosocial and functional/safety risks, and cognitive dysfunction are all negative except as indicated below. These results, as well as other patient data from the [address] form, are documented in Flowsheets linked to this Encounter. No Known Allergies  Prior to Visit Medications    Medication Sig Taking? Authorizing Provider   lisinopril (PRINIVIL;ZESTRIL) 10 MG tablet TAKE ONE TABLET BY MOUTH DAILY  Candance Pavlov, MD   atorvastatin (LIPITOR) 20 MG tablet TAKE ONE TABLET BY MOUTH DAILY  Candance Pavlov, MD   Cetirizine HCl (ZYRTEC ALLERGY PO) Take by mouth  Historical Provider, MD   aspirin 81 MG tablet Take 81 mg by mouth daily.   Historical Provider, MD     Past Medical History:   Diagnosis Date    Colon polyps 7.26.06    x2    Hypertension     resolved with weight loss    Malaria     [facility]    Obstructive sleep apnea (adult) (pediatric)      Past Surgical History:   Procedure Laterality Date    CYST REMOVAL  1997    back x 2    LASIK  2001    SHOULDER ARTHROSCOPY      TYMPANOSTOMY TUBE PLACEMENT  2002     Family History   Problem Relation Age of Onset    Heart Attack Father 76        smoker    Crohn's Disease Brother         healthy    Heart Attack Brother 50       CareTeam (Including outside providers/suppliers regularly involved in providing care):   Patient Care Team:  Candance Pavlov, MD as PCP - General (Family Medicine)  Arabella Devi MD as PCP - S Attributed Provider  Neo Douglas MD as Consulting Physician (Sleep Medicine)  Jamir Garza MD as Surgeon (Orthopedic Surgery)    Wt Readings from Last 3 Encounters:   19 252 lb (114.3 kg)   19 251 lb 6.6 oz (114 kg)   18 243 lb (110.2 kg)     Vitals: 05/02/19 1156   BP: 114/74   Site: Left Upper Arm   Position: Sitting   Cuff Size: Large Adult   Pulse: 74   Resp: 14   SpO2: 95%   Weight: 252 lb (114.3 kg)   Height: 6' 0.5\" (1.842 m)     Body mass index is 33.71 kg/m². Based upon direct observation of the patient, evaluation of cognition reveals recent and remote memory intact. General Appearance: alert and oriented to person, place and time, well developed and well- nourished, in no acute distress  Skin: warm and dry, no rash or erythema  Head: normocephalic and atraumatic  Eyes: pupils equal, round, and reactive to light, extraocular eye movements intact, conjunctivae normal  ENT: tympanic membrane, external ear and ear canal normal bilaterally, nose without deformity, nasal mucosa and turbinates normal without polyps  Neck: supple and non-tender without mass, no thyromegaly or thyroid nodules, no cervical lymphadenopathy  Pulmonary/Chest: clear to auscultation bilaterally- no wheezes, rales or rhonchi, normal air movement, no respiratory distress  Cardiovascular: normal rate, regular rhythm, normal S1 and S2, no murmurs, rubs, clicks, or gallops, distal pulses intact, no carotid bruits  Abdomen: soft, non-tender, non-distended, normal bowel sounds, no masses or organomegaly  Extremities: no cyanosis, clubbing or edema  Musculoskeletal: normal range of motion, no joint swelling, deformity or tenderness  Neurologic: reflexes normal and symmetric, no cranial nerve deficit, gait, coordination and speech normal    Patient's complete Health Risk Assessment and screening values have been reviewed and are found in Flowsheets. The following problems were reviewed today and where indicated follow up appointments were made and/or referrals ordered. Positive Risk Factor Screenings with Interventions:     General Health:  General  In general, how would you say your health is?: Good  In the past 7 days, have you experienced any of the following?  New or Increased instructions/AVS.  .   Recommended screening schedule for the next 5-10 years is provided to the patient in written form: see Patient Instructions/AVS.

## 2019-05-02 NOTE — PATIENT INSTRUCTIONS
Personalized Preventive Plan for Tylor Burr  - 5/2/2019  Medicare offers a range of preventive health benefits. Some of the tests and screenings are paid in full while other may be subject to a deductible, co-insurance, and/or copay. Some of these benefits include a comprehensive review of your medical history including lifestyle, illnesses that may run in your family, and various assessments and screenings as appropriate. After reviewing your medical record and screening and assessments performed today your provider may have ordered immunizations, labs, imaging, and/or referrals for you. A list of these orders (if applicable) as well as your Preventive Care list are included within your After Visit Summary for your review. Other Preventive Recommendations:    · A preventive eye exam performed by an eye specialist is recommended every 1-2 years to screen for glaucoma; cataracts, macular degeneration, and other eye disorders. · A preventive dental visit is recommended every 6 months. · Try to get at least 150 minutes of exercise per week or 10,000 steps per day on a pedometer . · Order or download the FREE \"Exercise & Physical Activity: Your Everyday Guide\" from [employer]. Call 9-229.614.6685 or search [employer] online. · You need 7923-6079 mg of calcium and 5527-5412 IU of vitamin D per day. It is possible to meet your calcium requirement with diet alone, but a vitamin D supplement is usually necessary to meet this goal.  · When exposed to the sun, use a sunscreen that protects against both UVA and UVB radiation with an SPF of 30 or greater. Reapply every 2 to 3 hours or after sweating, drying off with a towel, or swimming. · Always wear a seat belt when traveling in a car. Always wear a helmet when riding a bicycle or motorcycle.

## 2019-07-11 ENCOUNTER — OFFICE VISIT (OUTPATIENT)
Dept: PULMONOLOGY | Age: 69
End: 2019-07-11
Payer: MEDICARE

## 2019-07-11 VITALS
WEIGHT: 253 LBS | OXYGEN SATURATION: 96 % | HEART RATE: 77 BPM | DIASTOLIC BLOOD PRESSURE: 80 MMHG | SYSTOLIC BLOOD PRESSURE: 119 MMHG | HEIGHT: 73 IN | BODY MASS INDEX: 33.53 KG/M2

## 2019-07-11 DIAGNOSIS — G47.33 OBSTRUCTIVE SLEEP APNEA SYNDROME: Primary | ICD-10-CM

## 2019-07-11 DIAGNOSIS — E66.9 NON MORBID OBESITY, UNSPECIFIED OBESITY TYPE: Chronic | ICD-10-CM

## 2019-07-11 DIAGNOSIS — I10 ESSENTIAL HYPERTENSION: Chronic | ICD-10-CM

## 2019-07-11 DIAGNOSIS — J30.9 ALLERGIC RHINITIS, UNSPECIFIED SEASONALITY, UNSPECIFIED TRIGGER: Chronic | ICD-10-CM

## 2019-07-11 PROCEDURE — 1036F TOBACCO NON-USER: CPT | Performed by: INTERNAL MEDICINE

## 2019-07-11 PROCEDURE — 1123F ACP DISCUSS/DSCN MKR DOCD: CPT | Performed by: INTERNAL MEDICINE

## 2019-07-11 PROCEDURE — G8417 CALC BMI ABV UP PARAM F/U: HCPCS | Performed by: INTERNAL MEDICINE

## 2019-07-11 PROCEDURE — 4040F PNEUMOC VAC/ADMIN/RCVD: CPT | Performed by: INTERNAL MEDICINE

## 2019-07-11 PROCEDURE — 3017F COLORECTAL CA SCREEN DOC REV: CPT | Performed by: INTERNAL MEDICINE

## 2019-07-11 PROCEDURE — G8427 DOCREV CUR MEDS BY ELIG CLIN: HCPCS | Performed by: INTERNAL MEDICINE

## 2019-07-11 PROCEDURE — 99204 OFFICE O/P NEW MOD 45 MIN: CPT | Performed by: INTERNAL MEDICINE

## 2019-07-11 ASSESSMENT — ENCOUNTER SYMPTOMS
APNEA: 0
CHOKING: 0
ABDOMINAL PAIN: 0
CHEST TIGHTNESS: 0
VOMITING: 0
EYE PAIN: 0
ABDOMINAL DISTENTION: 0
ALLERGIC/IMMUNOLOGIC NEGATIVE: 1
NAUSEA: 0
PHOTOPHOBIA: 0
RHINORRHEA: 0
SHORTNESS OF BREATH: 0

## 2019-07-11 ASSESSMENT — SLEEP AND FATIGUE QUESTIONNAIRES
HOW LIKELY ARE YOU TO NOD OFF OR FALL ASLEEP WHILE SITTING INACTIVE IN A PUBLIC PLACE: 1
HOW LIKELY ARE YOU TO NOD OFF OR FALL ASLEEP WHEN YOU ARE A PASSENGER IN A CAR FOR AN HOUR WITHOUT A BREAK: 1
HOW LIKELY ARE YOU TO NOD OFF OR FALL ASLEEP WHILE WATCHING TV: 2
HOW LIKELY ARE YOU TO NOD OFF OR FALL ASLEEP WHILE SITTING AND TALKING TO SOMEONE: 0
HOW LIKELY ARE YOU TO NOD OFF OR FALL ASLEEP WHILE SITTING QUIETLY AFTER LUNCH WITHOUT ALCOHOL: 1
NECK CIRCUMFERENCE (INCHES): 16.5
HOW LIKELY ARE YOU TO NOD OFF OR FALL ASLEEP WHILE LYING DOWN TO REST IN THE AFTERNOON WHEN CIRCUMSTANCES PERMIT: 0
ESS TOTAL SCORE: 7
HOW LIKELY ARE YOU TO NOD OFF OR FALL ASLEEP WHILE SITTING AND READING: 2
HOW LIKELY ARE YOU TO NOD OFF OR FALL ASLEEP IN A CAR, WHILE STOPPED FOR A FEW MINUTES IN TRAFFIC: 0

## 2019-07-11 NOTE — PROGRESS NOTES
of: Having issues with rain-out despite changing humidity settings over the last 2-3 months. Not sleeping as well, waking with water in his mask. Tried to adjust humidity but did not help. Then stopped using water all together but still has heater is on. Machine was last checked in 2015 and was on auto mode but today machine is on fixed pressure. AHI above 5/hr. Machine Modem/Download Info:  Compliance (hours/night): 6.9 hrs/night  Download AHI (/hour): 6.4 /HR   CPAP - Settings  Pressure: 14 cmH2O  Manometer: 25 cmH2O  Altitude: 1   Comfort Settings  Humidity Level (0-8): 5  Heated Tubing (Yes/No): Yes  Flex/EPR (0-3): 1 PAP Mask  Mask Type: Nasal mask  Mask Model: Wisp  Mask Size: lg       Hypertension, allergic rhinitis and obesity: stable on meds and followed by pt's PCP and other physicians. Previous evaluation and treatment has included- sleep study done on 8/19/15 which showed an AHI - 55.9/hr with Low SaO2 - 86% and time below 90% of 0.9 min. DOT/CDL - No  FAA/'s license -No    Previous Report(s) Reviewed: historical medical records, office notes, andreferral letter(s). Pertinent data has been documented. McGrann - Total score: 7    Caffeine Intake - Coffee: 2 cup(s) per day    Social History     Socioeconomic History    Marital status:      Spouse name: Enrike Bird Number of children: 3    Years of education: Not on file    Highest education level: Not on file   Occupational History    Occupation: retired     Comment: Missionary   Social Needs    Financial resource strain: Not on file    Food insecurity:     Worry: Not on file     Inability: Not on file   [employer] needs:     Medical: Not on file     Non-medical: Not on file   Tobacco Use    Smoking status: Never Smoker    Smokeless tobacco: Never Used   Substance and Sexual Activity    Alcohol use:  Yes     Alcohol/week: 0.0 oz     Types: 3 Glasses of wine per week     Comment: rare    Drug use: No    Sexual Attack Father 76        smoker    Sleep Apnea Brother     Sleep Apnea Brother     Crohn's Disease Brother         healthy    Heart Attack Brother 50       Review of Systems   Constitutional: Positive for fatigue. Negative for activity change and appetite change. HENT: Positive for congestion. Negative for nosebleeds, postnasal drip, rhinorrhea and sneezing. Eyes: Negative for photophobia, pain and visual disturbance. Respiratory: Negative for apnea, choking, chest tightness and shortness of breath. Cardiovascular: Negative. Gastrointestinal: Negative for abdominal distention, abdominal pain, nausea and vomiting. Endocrine: Negative for cold intolerance and heat intolerance. Genitourinary: Negative for difficulty urinating, dysuria, frequency and urgency. Musculoskeletal: Negative. Negative for neck pain and neck stiffness. Skin: Negative. Allergic/Immunologic: Negative. Neurological: Negative for tremors, seizures, syncope and weakness. Hematological: Negative for adenopathy. Does not bruise/bleed easily. Psychiatric/Behavioral: Positive for sleep disturbance. Negative for agitation, behavioral problems and confusion. Objective:     Vitals:  Weight BMI   Wt Readings from Last 3 Encounters:   07/11/19 253 lb (114.8 kg)   05/02/19 252 lb (114.3 kg)   04/23/19 251 lb 6.6 oz (114 kg)    Body mass index is 33.38 kg/m². BP HR SaO2   BP Readings from Last 3 Encounters:   07/11/19 119/80   05/02/19 114/74   04/23/19 132/74    Pulse Readings from Last 3 Encounters:   07/11/19 77   05/02/19 74   04/23/19 55    SpO2 Readings from Last 3 Encounters:   07/11/19 96%   05/02/19 95%   04/23/19 97%        Physical Exam   Constitutional: He is oriented to person, place, and time. Vital signs are normal. He appears well-developed and well-nourished. Non-toxic appearance. He does not have a sickly appearance. He is not intubated. HENT:   Head: Normocephalic and atraumatic.  Not

## 2019-07-15 RX ORDER — ATORVASTATIN CALCIUM 20 MG/1
TABLET, FILM COATED ORAL
Qty: 90 TABLET | Refills: 0 | Status: SHIPPED | OUTPATIENT
Start: 2019-07-15 | End: 2019-10-14 | Stop reason: SDUPTHER

## 2019-07-23 RX ORDER — LISINOPRIL 10 MG/1
TABLET ORAL
Qty: 90 TABLET | Refills: 0 | Status: SHIPPED | OUTPATIENT
Start: 2019-07-23 | End: 2019-10-22 | Stop reason: SDUPTHER

## 2019-08-19 ENCOUNTER — TELEPHONE (OUTPATIENT)
Dept: PRIMARY CARE CLINIC | Age: 69
End: 2019-08-19

## 2019-08-23 ENCOUNTER — OFFICE VISIT (OUTPATIENT)
Dept: PRIMARY CARE CLINIC | Age: 69
End: 2019-08-23
Payer: MEDICARE

## 2019-08-23 VITALS
BODY MASS INDEX: 32.59 KG/M2 | RESPIRATION RATE: 14 BRPM | SYSTOLIC BLOOD PRESSURE: 129 MMHG | DIASTOLIC BLOOD PRESSURE: 91 MMHG | HEART RATE: 72 BPM | WEIGHT: 247 LBS | OXYGEN SATURATION: 98 %

## 2019-08-23 DIAGNOSIS — I10 ESSENTIAL HYPERTENSION: Primary | ICD-10-CM

## 2019-08-23 DIAGNOSIS — L30.9 DERMATITIS: ICD-10-CM

## 2019-08-23 DIAGNOSIS — Z71.84 COUNSELING FOR TRAVEL: ICD-10-CM

## 2019-08-23 PROCEDURE — G8427 DOCREV CUR MEDS BY ELIG CLIN: HCPCS | Performed by: FAMILY MEDICINE

## 2019-08-23 PROCEDURE — 3017F COLORECTAL CA SCREEN DOC REV: CPT | Performed by: FAMILY MEDICINE

## 2019-08-23 PROCEDURE — 1036F TOBACCO NON-USER: CPT | Performed by: FAMILY MEDICINE

## 2019-08-23 PROCEDURE — 4040F PNEUMOC VAC/ADMIN/RCVD: CPT | Performed by: FAMILY MEDICINE

## 2019-08-23 PROCEDURE — G8417 CALC BMI ABV UP PARAM F/U: HCPCS | Performed by: FAMILY MEDICINE

## 2019-08-23 PROCEDURE — 1123F ACP DISCUSS/DSCN MKR DOCD: CPT | Performed by: FAMILY MEDICINE

## 2019-08-23 PROCEDURE — 99214 OFFICE O/P EST MOD 30 MIN: CPT | Performed by: FAMILY MEDICINE

## 2019-08-23 RX ORDER — ATOVAQUONE AND PROGUANIL HYDROCHLORIDE 250; 100 MG/1; MG/1
1 TABLET, FILM COATED ORAL DAILY
Qty: 18 TABLET | Refills: 0 | Status: SHIPPED
Start: 2019-08-23 | End: 2020-05-20 | Stop reason: CLARIF

## 2019-08-23 RX ORDER — AMOXICILLIN 500 MG/1
500 CAPSULE ORAL 3 TIMES DAILY
COMMUNITY
End: 2019-10-10

## 2019-08-23 RX ORDER — MOMETASONE FUROATE 1 MG/G
CREAM TOPICAL
Qty: 50 G | Refills: 0 | Status: SHIPPED | OUTPATIENT
Start: 2019-08-23 | End: 2021-08-09 | Stop reason: SDUPTHER

## 2019-08-23 NOTE — PROGRESS NOTES
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 No current facility-administered medications for this visit. No Known Allergies      Objective:   Physical Exam   Vitals reviewed. Constitutional: He is oriented to person, place, and time. He appears well-developed and well-nourished. HENT:   Mouth/Throat: Oropharynx is clear and moist.   Eyes: Conjunctivae are normal. No scleral icterus. Neck: Normal range of motion. Neck supple. No JVD present. Carotid bruit is not present. No thyromegaly present. Cardiovascular: Normal rate, regular rhythm, normal heart sounds and intact distal pulses. No murmur heard. Pulmonary/Chest: Effort normal and breath sounds normal. No respiratory distress. He has no wheezes. He has no rales. He exhibits no tenderness. Abdominal: Soft. Bowel sounds are normal. He exhibits no distension and no mass. There is no tenderness. There is no rebound and no guarding. Musculoskeletal: Normal range of motion. He exhibits no edema and  tenderness. at the left shoulder antonietta with internal rotation, no swelling muscle strength is 5/5   Neurological: He is alert and oriented to person, place, and time. Skin: No rash noted. Assessment:         Diagnosis Orders   1. Essential hypertension     2.  Counseling for travel  atovaquone-proguanil (MALARONE) 250-100 MG

## 2019-10-10 ENCOUNTER — OFFICE VISIT (OUTPATIENT)
Dept: PULMONOLOGY | Age: 69
End: 2019-10-10
Payer: MEDICARE

## 2019-10-10 VITALS
DIASTOLIC BLOOD PRESSURE: 80 MMHG | OXYGEN SATURATION: 98 % | HEART RATE: 69 BPM | SYSTOLIC BLOOD PRESSURE: 128 MMHG | BODY MASS INDEX: 33.8 KG/M2 | WEIGHT: 255 LBS | HEIGHT: 73 IN

## 2019-10-10 DIAGNOSIS — G47.33 OBSTRUCTIVE SLEEP APNEA: Primary | Chronic | ICD-10-CM

## 2019-10-10 DIAGNOSIS — E66.9 CLASS 1 OBESITY WITHOUT SERIOUS COMORBIDITY WITH BODY MASS INDEX (BMI) OF 32.0 TO 32.9 IN ADULT, UNSPECIFIED OBESITY TYPE: ICD-10-CM

## 2019-10-10 DIAGNOSIS — I10 ESSENTIAL HYPERTENSION: Chronic | ICD-10-CM

## 2019-10-10 DIAGNOSIS — J30.9 ALLERGIC RHINITIS, UNSPECIFIED SEASONALITY, UNSPECIFIED TRIGGER: Chronic | ICD-10-CM

## 2019-10-10 PROBLEM — E66.811 CLASS 1 OBESITY WITHOUT SERIOUS COMORBIDITY WITH BODY MASS INDEX (BMI) OF 32.0 TO 32.9 IN ADULT: Status: ACTIVE | Noted: 2019-10-10

## 2019-10-10 PROCEDURE — G8417 CALC BMI ABV UP PARAM F/U: HCPCS | Performed by: NURSE PRACTITIONER

## 2019-10-10 PROCEDURE — 1123F ACP DISCUSS/DSCN MKR DOCD: CPT | Performed by: NURSE PRACTITIONER

## 2019-10-10 PROCEDURE — 1036F TOBACCO NON-USER: CPT | Performed by: NURSE PRACTITIONER

## 2019-10-10 PROCEDURE — 4040F PNEUMOC VAC/ADMIN/RCVD: CPT | Performed by: NURSE PRACTITIONER

## 2019-10-10 PROCEDURE — 99214 OFFICE O/P EST MOD 30 MIN: CPT | Performed by: NURSE PRACTITIONER

## 2019-10-10 PROCEDURE — 3017F COLORECTAL CA SCREEN DOC REV: CPT | Performed by: NURSE PRACTITIONER

## 2019-10-10 PROCEDURE — G8484 FLU IMMUNIZE NO ADMIN: HCPCS | Performed by: NURSE PRACTITIONER

## 2019-10-10 PROCEDURE — G8427 DOCREV CUR MEDS BY ELIG CLIN: HCPCS | Performed by: NURSE PRACTITIONER

## 2019-10-10 ASSESSMENT — ENCOUNTER SYMPTOMS
ABDOMINAL DISTENTION: 0
SHORTNESS OF BREATH: 0
EYE REDNESS: 0
SINUS PRESSURE: 0
APNEA: 0
EYE PAIN: 0
ABDOMINAL PAIN: 0
RHINORRHEA: 0
COUGH: 0

## 2019-10-10 ASSESSMENT — SLEEP AND FATIGUE QUESTIONNAIRES
HOW LIKELY ARE YOU TO NOD OFF OR FALL ASLEEP WHEN YOU ARE A PASSENGER IN A CAR FOR AN HOUR WITHOUT A BREAK: 1
HOW LIKELY ARE YOU TO NOD OFF OR FALL ASLEEP WHILE SITTING AND TALKING TO SOMEONE: 0
HOW LIKELY ARE YOU TO NOD OFF OR FALL ASLEEP WHILE WATCHING TV: 1
ESS TOTAL SCORE: 5
HOW LIKELY ARE YOU TO NOD OFF OR FALL ASLEEP WHILE SITTING AND READING: 1
HOW LIKELY ARE YOU TO NOD OFF OR FALL ASLEEP WHILE LYING DOWN TO REST IN THE AFTERNOON WHEN CIRCUMSTANCES PERMIT: 1
HOW LIKELY ARE YOU TO NOD OFF OR FALL ASLEEP IN A CAR, WHILE STOPPED FOR A FEW MINUTES IN TRAFFIC: 0
HOW LIKELY ARE YOU TO NOD OFF OR FALL ASLEEP WHILE SITTING INACTIVE IN A PUBLIC PLACE: 0
HOW LIKELY ARE YOU TO NOD OFF OR FALL ASLEEP WHILE SITTING QUIETLY AFTER LUNCH WITHOUT ALCOHOL: 1

## 2019-10-14 RX ORDER — ATORVASTATIN CALCIUM 20 MG/1
TABLET, FILM COATED ORAL
Qty: 90 TABLET | Refills: 0 | Status: SHIPPED | OUTPATIENT
Start: 2019-10-14 | End: 2020-01-09 | Stop reason: SDUPTHER

## 2019-10-22 RX ORDER — LISINOPRIL 10 MG/1
TABLET ORAL
Qty: 90 TABLET | Refills: 0 | Status: SHIPPED | OUTPATIENT
Start: 2019-10-22 | End: 2020-01-09 | Stop reason: SDUPTHER

## 2019-12-11 ENCOUNTER — OFFICE VISIT (OUTPATIENT)
Dept: ORTHOPEDIC SURGERY | Age: 69
End: 2019-12-11
Payer: MEDICARE

## 2019-12-11 VITALS
WEIGHT: 260 LBS | DIASTOLIC BLOOD PRESSURE: 80 MMHG | BODY MASS INDEX: 34.46 KG/M2 | SYSTOLIC BLOOD PRESSURE: 126 MMHG | HEIGHT: 73 IN | HEART RATE: 88 BPM

## 2019-12-11 DIAGNOSIS — M67.911 DYSFUNCTION OF RIGHT ROTATOR CUFF: ICD-10-CM

## 2019-12-11 DIAGNOSIS — M25.511 RIGHT SHOULDER PAIN, UNSPECIFIED CHRONICITY: Primary | ICD-10-CM

## 2019-12-11 PROCEDURE — 1123F ACP DISCUSS/DSCN MKR DOCD: CPT | Performed by: ORTHOPAEDIC SURGERY

## 2019-12-11 PROCEDURE — G8417 CALC BMI ABV UP PARAM F/U: HCPCS | Performed by: ORTHOPAEDIC SURGERY

## 2019-12-11 PROCEDURE — 4040F PNEUMOC VAC/ADMIN/RCVD: CPT | Performed by: ORTHOPAEDIC SURGERY

## 2019-12-11 PROCEDURE — 3017F COLORECTAL CA SCREEN DOC REV: CPT | Performed by: ORTHOPAEDIC SURGERY

## 2019-12-11 PROCEDURE — 99214 OFFICE O/P EST MOD 30 MIN: CPT | Performed by: ORTHOPAEDIC SURGERY

## 2019-12-11 PROCEDURE — G8427 DOCREV CUR MEDS BY ELIG CLIN: HCPCS | Performed by: ORTHOPAEDIC SURGERY

## 2019-12-11 PROCEDURE — 1036F TOBACCO NON-USER: CPT | Performed by: ORTHOPAEDIC SURGERY

## 2019-12-11 PROCEDURE — G8484 FLU IMMUNIZE NO ADMIN: HCPCS | Performed by: ORTHOPAEDIC SURGERY

## 2019-12-11 ASSESSMENT — ENCOUNTER SYMPTOMS
ALLERGIC/IMMUNOLOGIC NEGATIVE: 1
RESPIRATORY NEGATIVE: 1
EYES NEGATIVE: 1
GASTROINTESTINAL NEGATIVE: 1

## 2019-12-13 ENCOUNTER — TELEPHONE (OUTPATIENT)
Dept: ORTHOPEDIC SURGERY | Age: 69
End: 2019-12-13

## 2019-12-18 ENCOUNTER — OFFICE VISIT (OUTPATIENT)
Dept: ORTHOPEDIC SURGERY | Age: 69
End: 2019-12-18
Payer: MEDICARE

## 2019-12-18 VITALS
DIASTOLIC BLOOD PRESSURE: 79 MMHG | BODY MASS INDEX: 34.45 KG/M2 | HEIGHT: 73 IN | HEART RATE: 69 BPM | WEIGHT: 259.92 LBS | SYSTOLIC BLOOD PRESSURE: 125 MMHG

## 2019-12-18 DIAGNOSIS — M75.121 COMPLETE TEAR OF RIGHT ROTATOR CUFF, UNSPECIFIED WHETHER TRAUMATIC: Primary | ICD-10-CM

## 2019-12-18 DIAGNOSIS — M67.911 DYSFUNCTION OF RIGHT ROTATOR CUFF: ICD-10-CM

## 2019-12-18 PROCEDURE — G8417 CALC BMI ABV UP PARAM F/U: HCPCS | Performed by: ORTHOPAEDIC SURGERY

## 2019-12-18 PROCEDURE — 1036F TOBACCO NON-USER: CPT | Performed by: ORTHOPAEDIC SURGERY

## 2019-12-18 PROCEDURE — G8427 DOCREV CUR MEDS BY ELIG CLIN: HCPCS | Performed by: ORTHOPAEDIC SURGERY

## 2019-12-18 PROCEDURE — G8484 FLU IMMUNIZE NO ADMIN: HCPCS | Performed by: ORTHOPAEDIC SURGERY

## 2019-12-18 PROCEDURE — 3017F COLORECTAL CA SCREEN DOC REV: CPT | Performed by: ORTHOPAEDIC SURGERY

## 2019-12-18 PROCEDURE — 99213 OFFICE O/P EST LOW 20 MIN: CPT | Performed by: ORTHOPAEDIC SURGERY

## 2019-12-18 PROCEDURE — L3670 SO ACRO/CLAV CAN WEB PRE OTS: HCPCS | Performed by: ORTHOPAEDIC SURGERY

## 2019-12-18 PROCEDURE — 1123F ACP DISCUSS/DSCN MKR DOCD: CPT | Performed by: ORTHOPAEDIC SURGERY

## 2019-12-18 PROCEDURE — 4040F PNEUMOC VAC/ADMIN/RCVD: CPT | Performed by: ORTHOPAEDIC SURGERY

## 2020-01-09 ENCOUNTER — OFFICE VISIT (OUTPATIENT)
Dept: PRIMARY CARE CLINIC | Age: 70
End: 2020-01-09
Payer: MEDICARE

## 2020-01-09 VITALS
DIASTOLIC BLOOD PRESSURE: 93 MMHG | HEART RATE: 64 BPM | SYSTOLIC BLOOD PRESSURE: 148 MMHG | WEIGHT: 268.8 LBS | RESPIRATION RATE: 12 BRPM | OXYGEN SATURATION: 97 % | BODY MASS INDEX: 35.47 KG/M2

## 2020-01-09 DIAGNOSIS — I10 ESSENTIAL HYPERTENSION: Chronic | ICD-10-CM

## 2020-01-09 LAB
ANION GAP SERPL CALCULATED.3IONS-SCNC: 17 MMOL/L (ref 3–16)
BUN BLDV-MCNC: 14 MG/DL (ref 7–20)
CALCIUM SERPL-MCNC: 9.9 MG/DL (ref 8.3–10.6)
CHLORIDE BLD-SCNC: 102 MMOL/L (ref 99–110)
CO2: 23 MMOL/L (ref 21–32)
CREAT SERPL-MCNC: 0.9 MG/DL (ref 0.8–1.3)
GFR AFRICAN AMERICAN: >60
GFR NON-AFRICAN AMERICAN: >60
GLUCOSE BLD-MCNC: 84 MG/DL (ref 70–99)
HCT VFR BLD CALC: 43.3 % (ref 40.5–52.5)
HEMOGLOBIN: 14.6 G/DL (ref 13.5–17.5)
HEPATITIS C ANTIBODY INTERPRETATION: NORMAL
MCH RBC QN AUTO: 29.8 PG (ref 26–34)
MCHC RBC AUTO-ENTMCNC: 33.7 G/DL (ref 31–36)
MCV RBC AUTO: 88.4 FL (ref 80–100)
PDW BLD-RTO: 14 % (ref 12.4–15.4)
PLATELET # BLD: 226 K/UL (ref 135–450)
PMV BLD AUTO: 7.5 FL (ref 5–10.5)
POTASSIUM SERPL-SCNC: 5 MMOL/L (ref 3.5–5.1)
RBC # BLD: 4.89 M/UL (ref 4.2–5.9)
SODIUM BLD-SCNC: 142 MMOL/L (ref 136–145)
WBC # BLD: 6.6 K/UL (ref 4–11)

## 2020-01-09 PROCEDURE — 4040F PNEUMOC VAC/ADMIN/RCVD: CPT | Performed by: FAMILY MEDICINE

## 2020-01-09 PROCEDURE — G8427 DOCREV CUR MEDS BY ELIG CLIN: HCPCS | Performed by: FAMILY MEDICINE

## 2020-01-09 PROCEDURE — G8417 CALC BMI ABV UP PARAM F/U: HCPCS | Performed by: FAMILY MEDICINE

## 2020-01-09 PROCEDURE — 1123F ACP DISCUSS/DSCN MKR DOCD: CPT | Performed by: FAMILY MEDICINE

## 2020-01-09 PROCEDURE — 93000 ELECTROCARDIOGRAM COMPLETE: CPT | Performed by: FAMILY MEDICINE

## 2020-01-09 PROCEDURE — G8484 FLU IMMUNIZE NO ADMIN: HCPCS | Performed by: FAMILY MEDICINE

## 2020-01-09 PROCEDURE — 1036F TOBACCO NON-USER: CPT | Performed by: FAMILY MEDICINE

## 2020-01-09 PROCEDURE — 3017F COLORECTAL CA SCREEN DOC REV: CPT | Performed by: FAMILY MEDICINE

## 2020-01-09 PROCEDURE — 99214 OFFICE O/P EST MOD 30 MIN: CPT | Performed by: FAMILY MEDICINE

## 2020-01-09 RX ORDER — LISINOPRIL 10 MG/1
10 TABLET ORAL 2 TIMES DAILY
Qty: 180 TABLET | Refills: 1 | Status: SHIPPED | OUTPATIENT
Start: 2020-01-09 | End: 2020-07-20

## 2020-01-09 RX ORDER — ATORVASTATIN CALCIUM 20 MG/1
20 TABLET, FILM COATED ORAL DAILY
Qty: 90 TABLET | Refills: 1 | Status: SHIPPED | OUTPATIENT
Start: 2020-01-09 | End: 2020-07-16

## 2020-01-09 ASSESSMENT — ENCOUNTER SYMPTOMS
ALLERGIC/IMMUNOLOGIC NEGATIVE: 1
EYES NEGATIVE: 1
RESPIRATORY NEGATIVE: 1
GASTROINTESTINAL NEGATIVE: 1

## 2020-01-09 ASSESSMENT — PATIENT HEALTH QUESTIONNAIRE - PHQ9
SUM OF ALL RESPONSES TO PHQ QUESTIONS 1-9: 0
SUM OF ALL RESPONSES TO PHQ9 QUESTIONS 1 & 2: 0
2. FEELING DOWN, DEPRESSED OR HOPELESS: 0
SUM OF ALL RESPONSES TO PHQ QUESTIONS 1-9: 0
1. LITTLE INTEREST OR PLEASURE IN DOING THINGS: 0

## 2020-01-09 NOTE — PROGRESS NOTES
SUBJECTIVE:  Donavan Holder is a 77 y.o. male who presents for pre op for right shoulder surgery. The pain is described as constant and is 7/10 in intensity. Onset was several months ago. Symptoms have been gradually worsening since. Aggravating factors:any movements . Alleviating factors: rest. Associated symptoms: pain and muscle weakness. The patient denies chest pain or SOB, no fever or chills, no N/V/D, no cough. Review of Systems   Constitutional: Negative. HENT: Negative. Eyes: Negative. Respiratory: Negative. Cardiovascular: Negative. Gastrointestinal: Negative. Endocrine: Negative. Genitourinary: Negative. Musculoskeletal: Positive for arthralgias. Allergic/Immunologic: Negative. Neurological: Negative. Hematological: Negative. Psychiatric/Behavioral: Negative. All other systems reviewed and are negative.      Past Medical History:   Diagnosis Date    Colon polyps 7.26.06    x2    Hypertension     resolved with weight loss    [facility]aria 2011    [facility]    Obstructive sleep apnea     CPAP user    Obstructive sleep apnea (adult) (pediatric)       Patient Active Problem List    Diagnosis Date Noted    Class 1 obesity without serious comorbidity with body mass index (BMI) of 32.0 to 32.9 in adult 10/10/2019    Allergic rhinitis 07/11/2019    Obstructive sleep apnea     Poison ivy dermatitis 03/13/2015    Hypercholesterolemia 03/13/2015    Essential hypertension       Past Surgical History:   Procedure Laterality Date    CYST REMOVAL  1997    back x 2    LASIK  2001    SHOULDER ARTHROSCOPY      TYMPANOSTOMY TUBE PLACEMENT  2002     Family History   Problem Relation Age of Onset    Heart Attack Father 76        smoker    Sleep Apnea Brother     Sleep Apnea Brother     Crohn's Disease Brother         healthy    Heart Attack Brother 50     Social History     Socioeconomic History    Marital status:      Spouse name: Lincoln Manley Number of children: 4    Years of education: None    Highest education level: None   Occupational History    Occupation: retired     Comment: Missionary   Social Needs    Financial resource strain: None    Food insecurity:     Worry: None     Inability: None    Transportation needs:     Medical: None     Non-medical: None   Tobacco Use    Smoking status: Never Smoker    Smokeless tobacco: Never Used   Substance and Sexual Activity    Alcohol use: Yes     Alcohol/week: 0.0 standard drinks     Types: 3 Glasses of wine per week     Comment: rare    Drug use: No    Sexual activity: Yes     Partners: Female     Comment: M ,4 kids 27 O,79,40,89 yo   Lifestyle    Physical activity:     Days per week: None     Minutes per session: None    Stress: None   Relationships    Social connections:     Talks on phone: None     Gets together: None     Attends Nondenominational service: None     Active member of club or organization: None     Attends meetings of clubs or organizations: None     Relationship status: None    Intimate partner violence:     Fear of current or ex partner: None     Emotionally abused: None     Physically abused: None     Forced sexual activity: None   Other Topics Concern    None   Social History Narrative    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[employer]     4 children     9 G children      Current Outpatient Medications   Medication Sig Dispense Refill    lisinopril (PRINIVIL;ZESTRIL) 10 MG tablet TAKE ONE TABLET BY MOUTH DAILY 90 tablet 0    atorvastatin (LIPITOR) 20 MG tablet TAKE ONE TABLET BY MOUTH DAILY 90 tablet 0    mometasone (ELOCON) 0.1 % cream Apply topically daily. 50 g 0    atovaquone-proguanil (MALARONE) 250-100 MG per tablet Take 1 tablet by mouth daily Start 2 days prior to arrival in [facility] ([place] and continue daily until 7 days after returning to the  18 tablet 0    Cetirizine HCl (ZYRTEC ALLERGY PO) Take by mouth      aspirin 81 MG tablet Take 81 mg by mouth daily.        No current facility-administered medications for this visit. Current Outpatient Medications on File Prior to Visit   Medication Sig Dispense Refill    lisinopril (PRINIVIL;ZESTRIL) 10 MG tablet TAKE ONE TABLET BY MOUTH DAILY 90 tablet 0    atorvastatin (LIPITOR) 20 MG tablet TAKE ONE TABLET BY MOUTH DAILY 90 tablet 0    mometasone (ELOCON) 0.1 % cream Apply topically daily. 50 g 0    atovaquone-proguanil (MALARONE) 250-100 MG per tablet Take 1 tablet by mouth daily Start 2 days prior to arrival in [facility] ([place]) and continue daily until 7 days after returning to the  18 tablet 0    Cetirizine HCl (ZYRTEC ALLERGY PO) Take by mouth      aspirin 81 MG tablet Take 81 mg by mouth daily. No current facility-administered medications on file prior to visit. No Known Allergies    OBJECTIVE:  BP (!) 148/93 (Site: Left Upper Arm, Position: Sitting, Cuff Size: Large Adult)   Pulse 64   Resp 12   Wt 268 lb 12.8 oz (121.9 kg)   SpO2 97%   BMI 35.47 kg/m²   Physical Exam  Vitals signs reviewed. Constitutional:       Appearance: He is well-developed. HENT:      Head: Normocephalic and atraumatic. Right Ear: Tympanic membrane, ear canal and external ear normal.      Left Ear: Tympanic membrane, ear canal and external ear normal.   Eyes:      General: No scleral icterus. Conjunctiva/sclera: Conjunctivae normal.   Neck:      Musculoskeletal: Normal range of motion and neck supple. Thyroid: No thyromegaly. Vascular: No carotid bruit or JVD. Cardiovascular:      Rate and Rhythm: Normal rate and regular rhythm. Heart sounds: Normal heart sounds. No murmur. Pulmonary:      Effort: Pulmonary effort is normal. No respiratory distress. Breath sounds: Normal breath sounds. No wheezing or rales. Chest:      Chest wall: No tenderness. Abdominal:      General: Bowel sounds are normal. There is no distension. Palpations: Abdomen is soft. There is no mass. Tenderness: There is no tenderness. There is no guarding or rebound. Musculoskeletal: Normal range of motion. General: No tenderness. Skin:     Findings: No rash. Neurological:      Mental Status: He is alert and oriented to person, place, and time. ASSESSMENT/PLAN:   Diagnosis Orders   1. Pre-op examination  EKG 12 Lead   2. Chronic right shoulder pain     3. Essential hypertension  Basic Metabolic Panel    CBC    Hepatitis C Antibody   4. Hypercholesterolemia     5. Rotator cuff arthropathy of right shoulder         1. Discussed the risk of surgery including bleeding and infection, and the risks of general anesthetic including MI, CVA, sudden death or even reaction to anesthetic medications. The patient understands the risks, any and all questions were answered to the patient's satisfaction. 2. EKG within normal, okay for the planned surgery  Monitor BP, med's is adjusted today , to call with the readings       Date of Surgery Update (To be completed by Attending Surgeon day of surgery)  Copy is given to the pt and one is faxed to the surgeon.

## 2020-01-15 ENCOUNTER — OFFICE VISIT (OUTPATIENT)
Dept: PULMONOLOGY | Age: 70
End: 2020-01-15
Payer: MEDICARE

## 2020-01-15 VITALS
WEIGHT: 270 LBS | HEART RATE: 93 BPM | BODY MASS INDEX: 35.78 KG/M2 | OXYGEN SATURATION: 98 % | HEIGHT: 73 IN | DIASTOLIC BLOOD PRESSURE: 92 MMHG | SYSTOLIC BLOOD PRESSURE: 144 MMHG

## 2020-01-15 PROCEDURE — 1036F TOBACCO NON-USER: CPT | Performed by: NURSE PRACTITIONER

## 2020-01-15 PROCEDURE — G8427 DOCREV CUR MEDS BY ELIG CLIN: HCPCS | Performed by: NURSE PRACTITIONER

## 2020-01-15 PROCEDURE — G8417 CALC BMI ABV UP PARAM F/U: HCPCS | Performed by: NURSE PRACTITIONER

## 2020-01-15 PROCEDURE — 4040F PNEUMOC VAC/ADMIN/RCVD: CPT | Performed by: NURSE PRACTITIONER

## 2020-01-15 PROCEDURE — 1123F ACP DISCUSS/DSCN MKR DOCD: CPT | Performed by: NURSE PRACTITIONER

## 2020-01-15 PROCEDURE — 99214 OFFICE O/P EST MOD 30 MIN: CPT | Performed by: NURSE PRACTITIONER

## 2020-01-15 PROCEDURE — G8484 FLU IMMUNIZE NO ADMIN: HCPCS | Performed by: NURSE PRACTITIONER

## 2020-01-15 PROCEDURE — 3017F COLORECTAL CA SCREEN DOC REV: CPT | Performed by: NURSE PRACTITIONER

## 2020-01-15 ASSESSMENT — ENCOUNTER SYMPTOMS
SHORTNESS OF BREATH: 0
ABDOMINAL PAIN: 0
EYE PAIN: 0
SINUS PRESSURE: 0
APNEA: 0
ABDOMINAL DISTENTION: 0
RHINORRHEA: 0
COUGH: 0
EYE REDNESS: 0

## 2020-01-15 ASSESSMENT — SLEEP AND FATIGUE QUESTIONNAIRES
ESS TOTAL SCORE: 3
HOW LIKELY ARE YOU TO NOD OFF OR FALL ASLEEP WHILE SITTING AND READING: 1
HOW LIKELY ARE YOU TO NOD OFF OR FALL ASLEEP WHILE LYING DOWN TO REST IN THE AFTERNOON WHEN CIRCUMSTANCES PERMIT: 0
HOW LIKELY ARE YOU TO NOD OFF OR FALL ASLEEP WHILE SITTING INACTIVE IN A PUBLIC PLACE: 0
HOW LIKELY ARE YOU TO NOD OFF OR FALL ASLEEP WHILE SITTING AND TALKING TO SOMEONE: 0
HOW LIKELY ARE YOU TO NOD OFF OR FALL ASLEEP WHEN YOU ARE A PASSENGER IN A CAR FOR AN HOUR WITHOUT A BREAK: 0
HOW LIKELY ARE YOU TO NOD OFF OR FALL ASLEEP WHILE SITTING QUIETLY AFTER LUNCH WITHOUT ALCOHOL: 0
HOW LIKELY ARE YOU TO NOD OFF OR FALL ASLEEP IN A CAR, WHILE STOPPED FOR A FEW MINUTES IN TRAFFIC: 0
HOW LIKELY ARE YOU TO NOD OFF OR FALL ASLEEP WHILE WATCHING TV: 2

## 2020-01-15 NOTE — ASSESSMENT & PLAN NOTE
Reviewed compliance download with pt. Supplies and parts as needed for his machine. These are medically necessary. Continue medications per his PCP and other physicians. Limit caffeine use after 3pm.  Encouraged him to work on weight loss through diet and exercise. Diagnoses of Essential hypertension, Class 1 obesity without serious comorbidity with body mass index (BMI) of 32.0 to 32.9 in adult, unspecified obesity type, and Allergic rhinitis, unspecified seasonality, unspecified trigger were pertinent to this visit. The chronic medical conditions listed are directly related to the primary diagnosis listed above.   The management of the primary diagnosis affects the secondary diagnosis and vice versa.'

## 2020-01-15 NOTE — PROGRESS NOTES
to change humidification and/or tubing temperature for comfort while at home  [] Yes  [x] No     Difficulties falling asleep  [] Yes  [x] No   Difficulties staying asleep  [] Yes  [x] No   Approximate time to bed  9:30-11:30pm   Approximate wake time  5:30-6:30am   Taking Naps  no   If taking naps usual length    [x] NA   If taking naps using the machine  [] Yes  [] No  [x] NA [] With and With out    Drowsy when driving  [] Yes  [x] No     Does patient carry a DOT/CDL  [] Yes  [x] No     Does patient carry FAA/Pil[employer] License   [] Yes  [x] No      Any concerns noted with the machine at this time  [] Yes  [x] No        Diagnosis Orders   1. Obstructive sleep apnea     2. Essential hypertension     3. Class 1 obesity without serious comorbidity with body mass index (BMI) of 32.0 to 32.9 in adult, unspecified obesity type     4. Allergic rhinitis, unspecified seasonality, unspecified trigger         The chronic medical conditions listed are directly related to the primary diagnosis listed above. The management of the primary diagnosis affects the secondary diagnosis and vice versa. Review of Systems   Constitutional: Negative for appetite change, chills, fatigue and fever. HENT: Negative for congestion, nosebleeds, rhinorrhea and sinus pressure. Eyes: Negative for pain and redness. Respiratory: Negative for apnea, cough and shortness of breath. Cardiovascular: Negative for chest pain and palpitations. Gastrointestinal: Negative for abdominal distention and abdominal pain. Musculoskeletal:        R shoulder pain when sleeping     Neurological: Negative for dizziness and headaches. Psychiatric/Behavioral: Negative for sleep disturbance.        Social History     Socioeconomic History    Marital status:      Spouse name: Balta Delarosa Number of children: 3    Years of education: Not on file    Highest education level: Not on file   Occupational History    Occupation: retired     Comment: Yes Historical Provider, MD   aspirin 81 MG tablet Take 81 mg by mouth daily. Yes Historical Provider, MD       Allergies as of 01/15/2020    (No Known Allergies)       Patient Active Problem List   Diagnosis    Essential hypertension    Poison ivy dermatitis    Hypercholesterolemia    Obstructive sleep apnea    Allergic rhinitis    Class 1 obesity without serious comorbidity with body mass index (BMI) of 32.0 to 32.9 in adult       Past Medical History:   Diagnosis Date    Colon polyps 7.26.06    x2    Hypertension     resolved with weight loss    Malaria 2011    joyce    Obstructive sleep apnea     CPAP user    Obstructive sleep apnea (adult) (pediatric)        Past Surgical History:   Procedure Laterality Date    CYST REMOVAL  1997    back x 2    LASIK  2001    SHOULDER ARTHROSCOPY      TYMPANOSTOMY TUBE PLACEMENT  2002       Family History   Problem Relation Age of Onset    Heart Attack Father 76        smoker    Sleep Apnea Brother     Sleep Apnea Brother     Crohn's Disease Brother         healthy    Heart Attack Brother 48       Vitals:  Weight BMI   Wt Readings from Last 3 Encounters:   01/15/20 270 lb (122.5 kg)   01/09/20 268 lb 12.8 oz (121.9 kg)   12/18/19 259 lb 14.8 oz (117.9 kg)    Body mass index is 35.63 kg/m². BP HR SaO2   BP Readings from Last 3 Encounters:   01/15/20 (!) 144/92   01/09/20 (!) 148/93   12/18/19 125/79    Pulse Readings from Last 3 Encounters:   01/15/20 93   01/09/20 64   12/18/19 69    SpO2 Readings from Last 3 Encounters:   01/15/20 98%   01/09/20 97%   10/10/19 98%        Assessment/Plan:     Essential hypertension  Chronic- Stable. Cont meds per PCP and other physicians. Class 1 obesity without serious comorbidity with body mass index (BMI) of 32.0 to 32.9 in adult  Chronic-Stable. Encouraged him to work on weight loss through diet and exercise. Allergic rhinitis  Chronic- Stable. Cont meds per PCP and other physicians.       Obstructive of the defined types were placed in this encounter. No orders of the defined types were placed in this encounter.       Latosha Garcia, MSN, RN, CNP

## 2020-01-16 NOTE — PROGRESS NOTES
[facility] / [facility] ([facility] [address] [place], [facility]    Acknowledgment of Informed Consent for Surgical or Medical Procedure and Sedation  I agree to allow doctor(s) Sara Benjamin and his/her associates or assistants, including residents and/or other qualified medical practitioner to perform the following medical treatment or procedure and to administer or direct the administration of sedation as necessary:  Procedure(s): RIGHT SHOULDER ARTHROSCOPY DEBRIDEMENT, DECOMPRESSION, ROTATOR CUFF REPAIR WITH GRAFT AUGMENTATION  My doctor has explained the following regarding the proposed procedure:   the explanation of the procedure   the benefits of the procedure   the potential problems that might occur during recuperation   the risks and side effects of the procedure which could include but are not limited to severe blood loss, infection, stroke or death   the benefits, risks and side effect of alternative procedures including the consequences of declining this procedure or any alternative procedures   the likelihood of achieving satisfactory results. I acknowledge no guarantee or assurance has been made to me regarding the results. I understand that during the course of this treatment/procedure, unforeseen conditions can occur which require an additional or different procedure. I agree to allow my physician or assistants to perform such extension of the original procedure as they may find necessary. I understand that sedation will often result in temporary impairment of memory and fine motor skills and that sedation can occasionally progress to a state of deep sedation or general anesthesia. I understand the risks of anesthesia for surgery include, but are not limited to, sore throat, hoarseness, injury to face, mouth, or teeth; nausea; headache; injury to blood vessels or nerves; death, brain damage, or paralysis.     I understand that if I have a Limitation of Treatment

## 2020-01-24 ENCOUNTER — ANESTHESIA EVENT (OUTPATIENT)
Dept: OPERATING ROOM | Age: 70
End: 2020-01-24
Payer: MEDICARE

## 2020-01-27 ENCOUNTER — HOSPITAL ENCOUNTER (OUTPATIENT)
Age: 70
Setting detail: OUTPATIENT SURGERY
Discharge: HOME | End: 2020-01-27
Attending: ORTHOPAEDIC SURGERY | Admitting: ORTHOPAEDIC SURGERY
Payer: MEDICARE

## 2020-01-27 ENCOUNTER — ANESTHESIA (OUTPATIENT)
Dept: OPERATING ROOM | Age: 70
End: 2020-01-27
Payer: MEDICARE

## 2020-01-27 VITALS
TEMPERATURE: 97.6 F | HEART RATE: 69 BPM | DIASTOLIC BLOOD PRESSURE: 92 MMHG | RESPIRATION RATE: 16 BRPM | SYSTOLIC BLOOD PRESSURE: 156 MMHG | OXYGEN SATURATION: 95 % | HEIGHT: 74 IN | BODY MASS INDEX: 34.01 KG/M2 | WEIGHT: 265 LBS

## 2020-01-27 VITALS — SYSTOLIC BLOOD PRESSURE: 165 MMHG | TEMPERATURE: 96.8 F | OXYGEN SATURATION: 100 % | DIASTOLIC BLOOD PRESSURE: 92 MMHG

## 2020-01-27 PROBLEM — Z98.890 STATUS POST ARTHROSCOPY OF RIGHT SHOULDER: Status: ACTIVE | Noted: 2020-01-27

## 2020-01-27 PROCEDURE — 2580000003 HC RX 258: Performed by: ORTHOPAEDIC SURGERY

## 2020-01-27 PROCEDURE — C1713 ANCHOR/SCREW BN/BN,TIS/BN: HCPCS | Performed by: ORTHOPAEDIC SURGERY

## 2020-01-27 PROCEDURE — 3600000004 HC SURGERY LEVEL 4 BASE: Performed by: ORTHOPAEDIC SURGERY

## 2020-01-27 PROCEDURE — 3600000014 HC SURGERY LEVEL 4 ADDTL 15MIN: Performed by: ORTHOPAEDIC SURGERY

## 2020-01-27 PROCEDURE — 7100000010 HC PHASE II RECOVERY - FIRST 15 MIN: Performed by: ORTHOPAEDIC SURGERY

## 2020-01-27 PROCEDURE — 2580000003 HC RX 258: Performed by: NURSE ANESTHETIST, CERTIFIED REGISTERED

## 2020-01-27 PROCEDURE — 3700000000 HC ANESTHESIA ATTENDED CARE: Performed by: ORTHOPAEDIC SURGERY

## 2020-01-27 PROCEDURE — 6360000002 HC RX W HCPCS: Performed by: ORTHOPAEDIC SURGERY

## 2020-01-27 PROCEDURE — 3700000001 HC ADD 15 MINUTES (ANESTHESIA): Performed by: ORTHOPAEDIC SURGERY

## 2020-01-27 PROCEDURE — 64415 NJX AA&/STRD BRCH PLXS IMG: CPT | Performed by: ANESTHESIOLOGY

## 2020-01-27 PROCEDURE — 2500000003 HC RX 250 WO HCPCS: Performed by: NURSE ANESTHETIST, CERTIFIED REGISTERED

## 2020-01-27 PROCEDURE — 6360000002 HC RX W HCPCS: Performed by: ANESTHESIOLOGY

## 2020-01-27 PROCEDURE — 7100000001 HC PACU RECOVERY - ADDTL 15 MIN: Performed by: ORTHOPAEDIC SURGERY

## 2020-01-27 PROCEDURE — 7100000000 HC PACU RECOVERY - FIRST 15 MIN: Performed by: ORTHOPAEDIC SURGERY

## 2020-01-27 PROCEDURE — 2709999900 HC NON-CHARGEABLE SUPPLY: Performed by: ORTHOPAEDIC SURGERY

## 2020-01-27 PROCEDURE — 2720000010 HC SURG SUPPLY STERILE: Performed by: ORTHOPAEDIC SURGERY

## 2020-01-27 PROCEDURE — 7100000011 HC PHASE II RECOVERY - ADDTL 15 MIN: Performed by: ORTHOPAEDIC SURGERY

## 2020-01-27 PROCEDURE — 6360000002 HC RX W HCPCS: Performed by: NURSE ANESTHETIST, CERTIFIED REGISTERED

## 2020-01-27 PROCEDURE — 2780000010 HC IMPLANT OTHER: Performed by: ORTHOPAEDIC SURGERY

## 2020-01-27 DEVICE — BONE ANCHORS 3 WITH ARTHROSCOPIC DELIVERY SYSTEM ADVANCED
Type: IMPLANTABLE DEVICE | Site: SHOULDER | Status: FUNCTIONAL
Brand: BONE ANCHORS WITH ARTHROSCOPIC DELIVERY SYSTEM - ADVANCED

## 2020-01-27 DEVICE — ANCHOR SUT L14.7MM DIA5.5MM BIOCOMPOSITE W/ 3 SZ 2: Type: IMPLANTABLE DEVICE | Site: SHOULDER | Status: FUNCTIONAL

## 2020-01-27 DEVICE — IMPLANTABLE DEVICE
Type: IMPLANTABLE DEVICE | Site: SHOULDER | Status: FUNCTIONAL
Brand: BIOINDUCTIVE IMPLANT WITH ARTHROSCOPIC DELIVERY SYSTEM - LARGE

## 2020-01-27 DEVICE — Z INACTIVE USE 2600835 ANCHOR TEND 8 FOR REGENETEN BIOINDUCTIVE IMPL SYS: Type: IMPLANTABLE DEVICE | Site: SHOULDER | Status: FUNCTIONAL

## 2020-01-27 RX ORDER — ONDANSETRON 2 MG/ML
INJECTION INTRAMUSCULAR; INTRAVENOUS PRN
Status: DISCONTINUED | OUTPATIENT
Start: 2020-01-27 | End: 2020-01-27 | Stop reason: SDUPTHER

## 2020-01-27 RX ORDER — POLYETHYLENE GLYCOL 3350 17 G/17G
17 POWDER, FOR SOLUTION ORAL DAILY PRN
Qty: 510 G | Refills: 0 | Status: SHIPPED | OUTPATIENT
Start: 2020-01-27 | End: 2020-02-26

## 2020-01-27 RX ORDER — PROMETHAZINE HYDROCHLORIDE 25 MG/ML
6.25 INJECTION, SOLUTION INTRAMUSCULAR; INTRAVENOUS
Status: DISCONTINUED | OUTPATIENT
Start: 2020-01-27 | End: 2020-01-27 | Stop reason: HOSPADM

## 2020-01-27 RX ORDER — METOCLOPRAMIDE HYDROCHLORIDE 5 MG/ML
10 INJECTION INTRAMUSCULAR; INTRAVENOUS
Status: DISCONTINUED | OUTPATIENT
Start: 2020-01-27 | End: 2020-01-27 | Stop reason: HOSPADM

## 2020-01-27 RX ORDER — FENTANYL CITRATE 50 UG/ML
INJECTION, SOLUTION INTRAMUSCULAR; INTRAVENOUS PRN
Status: DISCONTINUED | OUTPATIENT
Start: 2020-01-27 | End: 2020-01-27 | Stop reason: SDUPTHER

## 2020-01-27 RX ORDER — OXYCODONE HYDROCHLORIDE AND ACETAMINOPHEN 5; 325 MG/1; MG/1
1 TABLET ORAL EVERY 6 HOURS PRN
Qty: 28 TABLET | Refills: 0 | Status: SHIPPED | OUTPATIENT
Start: 2020-01-27 | End: 2020-02-03

## 2020-01-27 RX ORDER — PROPOFOL 10 MG/ML
INJECTION, EMULSION INTRAVENOUS PRN
Status: DISCONTINUED | OUTPATIENT
Start: 2020-01-27 | End: 2020-01-27 | Stop reason: SDUPTHER

## 2020-01-27 RX ORDER — ROPIVACAINE HYDROCHLORIDE 5 MG/ML
INJECTION, SOLUTION EPIDURAL; INFILTRATION; PERINEURAL PRN
Status: DISCONTINUED | OUTPATIENT
Start: 2020-01-27 | End: 2020-01-27 | Stop reason: SDUPTHER

## 2020-01-27 RX ORDER — FENTANYL CITRATE 50 UG/ML
INJECTION, SOLUTION INTRAMUSCULAR; INTRAVENOUS
Status: COMPLETED
Start: 2020-01-27 | End: 2020-01-27

## 2020-01-27 RX ORDER — HYDRALAZINE HYDROCHLORIDE 20 MG/ML
5 INJECTION INTRAMUSCULAR; INTRAVENOUS EVERY 10 MIN PRN
Status: DISCONTINUED | OUTPATIENT
Start: 2020-01-27 | End: 2020-01-27 | Stop reason: HOSPADM

## 2020-01-27 RX ORDER — GLYCOPYRROLATE 0.2 MG/ML
INJECTION INTRAMUSCULAR; INTRAVENOUS PRN
Status: DISCONTINUED | OUTPATIENT
Start: 2020-01-27 | End: 2020-01-27 | Stop reason: SDUPTHER

## 2020-01-27 RX ORDER — EPHEDRINE SULFATE 50 MG/ML
INJECTION INTRAVENOUS PRN
Status: DISCONTINUED | OUTPATIENT
Start: 2020-01-27 | End: 2020-01-27 | Stop reason: SDUPTHER

## 2020-01-27 RX ORDER — ASPIRIN 325 MG
325 TABLET, DELAYED RELEASE (ENTERIC COATED) ORAL 2 TIMES DAILY
Qty: 30 TABLET | Refills: 0 | Status: SHIPPED | OUTPATIENT
Start: 2020-01-27 | End: 2020-02-19 | Stop reason: CLARIF

## 2020-01-27 RX ORDER — MIDAZOLAM HYDROCHLORIDE 1 MG/ML
INJECTION INTRAMUSCULAR; INTRAVENOUS
Status: COMPLETED
Start: 2020-01-27 | End: 2020-01-27

## 2020-01-27 RX ORDER — MORPHINE SULFATE 4 MG/ML
1 INJECTION, SOLUTION INTRAMUSCULAR; INTRAVENOUS EVERY 5 MIN PRN
Status: DISCONTINUED | OUTPATIENT
Start: 2020-01-27 | End: 2020-01-27 | Stop reason: HOSPADM

## 2020-01-27 RX ORDER — SODIUM CHLORIDE, SODIUM LACTATE, POTASSIUM CHLORIDE, CALCIUM CHLORIDE 600; 310; 30; 20 MG/100ML; MG/100ML; MG/100ML; MG/100ML
INJECTION, SOLUTION INTRAVENOUS CONTINUOUS PRN
Status: DISCONTINUED | OUTPATIENT
Start: 2020-01-27 | End: 2020-01-27 | Stop reason: SDUPTHER

## 2020-01-27 RX ORDER — DIPHENHYDRAMINE HYDROCHLORIDE 50 MG/ML
12.5 INJECTION INTRAMUSCULAR; INTRAVENOUS
Status: DISCONTINUED | OUTPATIENT
Start: 2020-01-27 | End: 2020-01-27 | Stop reason: HOSPADM

## 2020-01-27 RX ORDER — LABETALOL 20 MG/4 ML (5 MG/ML) INTRAVENOUS SYRINGE
5 EVERY 10 MIN PRN
Status: DISCONTINUED | OUTPATIENT
Start: 2020-01-27 | End: 2020-01-27 | Stop reason: HOSPADM

## 2020-01-27 RX ORDER — ROPIVACAINE HYDROCHLORIDE 5 MG/ML
INJECTION, SOLUTION EPIDURAL; INFILTRATION; PERINEURAL
Status: COMPLETED
Start: 2020-01-27 | End: 2020-01-27

## 2020-01-27 RX ORDER — LIDOCAINE HYDROCHLORIDE 20 MG/ML
INJECTION, SOLUTION INFILTRATION; PERINEURAL PRN
Status: DISCONTINUED | OUTPATIENT
Start: 2020-01-27 | End: 2020-01-27 | Stop reason: SDUPTHER

## 2020-01-27 RX ORDER — MIDAZOLAM HYDROCHLORIDE 1 MG/ML
INJECTION INTRAMUSCULAR; INTRAVENOUS PRN
Status: DISCONTINUED | OUTPATIENT
Start: 2020-01-27 | End: 2020-01-27 | Stop reason: SDUPTHER

## 2020-01-27 RX ORDER — MEPERIDINE HYDROCHLORIDE 25 MG/ML
12.5 INJECTION INTRAMUSCULAR; INTRAVENOUS; SUBCUTANEOUS EVERY 5 MIN PRN
Status: DISCONTINUED | OUTPATIENT
Start: 2020-01-27 | End: 2020-01-27 | Stop reason: HOSPADM

## 2020-01-27 RX ORDER — SODIUM CHLORIDE 0.9 % (FLUSH) 0.9 %
10 SYRINGE (ML) INJECTION PRN
Status: DISCONTINUED | OUTPATIENT
Start: 2020-01-27 | End: 2020-01-27 | Stop reason: HOSPADM

## 2020-01-27 RX ORDER — OXYCODONE HYDROCHLORIDE 5 MG/1
5 TABLET ORAL PRN
Status: DISCONTINUED | OUTPATIENT
Start: 2020-01-27 | End: 2020-01-27 | Stop reason: HOSPADM

## 2020-01-27 RX ORDER — ROCURONIUM BROMIDE 10 MG/ML
INJECTION, SOLUTION INTRAVENOUS PRN
Status: DISCONTINUED | OUTPATIENT
Start: 2020-01-27 | End: 2020-01-27 | Stop reason: SDUPTHER

## 2020-01-27 RX ORDER — DEXAMETHASONE SODIUM PHOSPHATE 4 MG/ML
INJECTION, SOLUTION INTRA-ARTICULAR; INTRALESIONAL; INTRAMUSCULAR; INTRAVENOUS; SOFT TISSUE PRN
Status: DISCONTINUED | OUTPATIENT
Start: 2020-01-27 | End: 2020-01-27 | Stop reason: SDUPTHER

## 2020-01-27 RX ORDER — OXYCODONE HYDROCHLORIDE 5 MG/1
10 TABLET ORAL PRN
Status: DISCONTINUED | OUTPATIENT
Start: 2020-01-27 | End: 2020-01-27 | Stop reason: HOSPADM

## 2020-01-27 RX ORDER — SODIUM CHLORIDE 0.9 % (FLUSH) 0.9 %
10 SYRINGE (ML) INJECTION EVERY 12 HOURS SCHEDULED
Status: DISCONTINUED | OUTPATIENT
Start: 2020-01-27 | End: 2020-01-27 | Stop reason: HOSPADM

## 2020-01-27 RX ORDER — SODIUM CHLORIDE, SODIUM LACTATE, POTASSIUM CHLORIDE, CALCIUM CHLORIDE 600; 310; 30; 20 MG/100ML; MG/100ML; MG/100ML; MG/100ML
INJECTION, SOLUTION INTRAVENOUS CONTINUOUS
Status: DISCONTINUED | OUTPATIENT
Start: 2020-01-27 | End: 2020-01-27 | Stop reason: HOSPADM

## 2020-01-27 RX ORDER — SODIUM CHLORIDE 9 MG/ML
INJECTION, SOLUTION INTRAVENOUS CONTINUOUS
Status: DISCONTINUED | OUTPATIENT
Start: 2020-01-27 | End: 2020-01-27 | Stop reason: HOSPADM

## 2020-01-27 RX ADMIN — SODIUM CHLORIDE, SODIUM LACTATE, POTASSIUM CHLORIDE, AND CALCIUM CHLORIDE: 600; 310; 30; 20 INJECTION, SOLUTION INTRAVENOUS at 11:00

## 2020-01-27 RX ADMIN — PROPOFOL 200 MG: 10 INJECTION, EMULSION INTRAVENOUS at 11:20

## 2020-01-27 RX ADMIN — MIDAZOLAM HYDROCHLORIDE 2 MG: 2 INJECTION, SOLUTION INTRAMUSCULAR; INTRAVENOUS at 10:38

## 2020-01-27 RX ADMIN — EPHEDRINE SULFATE 5 MG: 50 INJECTION INTRAVENOUS at 12:20

## 2020-01-27 RX ADMIN — EPHEDRINE SULFATE 10 MG: 50 INJECTION INTRAVENOUS at 11:48

## 2020-01-27 RX ADMIN — GLYCOPYRROLATE 0.4 MG: 0.2 INJECTION INTRAMUSCULAR; INTRAVENOUS at 14:29

## 2020-01-27 RX ADMIN — CEFAZOLIN SODIUM 3 G: 10 INJECTION, POWDER, FOR SOLUTION INTRAVENOUS at 11:36

## 2020-01-27 RX ADMIN — SODIUM CHLORIDE, SODIUM LACTATE, POTASSIUM CHLORIDE, AND CALCIUM CHLORIDE: 600; 310; 30; 20 INJECTION, SOLUTION INTRAVENOUS at 12:17

## 2020-01-27 RX ADMIN — ONDANSETRON 4 MG: 2 INJECTION INTRAMUSCULAR; INTRAVENOUS at 14:24

## 2020-01-27 RX ADMIN — ROPIVACAINE HYDROCHLORIDE 30 ML: 5 INJECTION, SOLUTION EPIDURAL; INFILTRATION; PERINEURAL at 10:40

## 2020-01-27 RX ADMIN — ROCURONIUM BROMIDE 40 MG: 10 INJECTION, SOLUTION INTRAVENOUS at 11:21

## 2020-01-27 RX ADMIN — CEFAZOLIN SODIUM: 10 INJECTION, POWDER, FOR SOLUTION INTRAVENOUS at 11:19

## 2020-01-27 RX ADMIN — NEOSTIGMINE METHYLSULFATE 3 MG: 1 INJECTION INTRAMUSCULAR; INTRAVENOUS; SUBCUTANEOUS at 14:29

## 2020-01-27 RX ADMIN — DEXAMETHASONE SODIUM PHOSPHATE 8 MG: 4 INJECTION, SOLUTION INTRAMUSCULAR; INTRAVENOUS at 11:40

## 2020-01-27 RX ADMIN — EPHEDRINE SULFATE 10 MG: 50 INJECTION INTRAVENOUS at 12:05

## 2020-01-27 RX ADMIN — LIDOCAINE HYDROCHLORIDE 100 MG: 20 INJECTION, SOLUTION INFILTRATION; PERINEURAL at 11:20

## 2020-01-27 RX ADMIN — GLYCOPYRROLATE 0.2 MG: 0.2 INJECTION INTRAMUSCULAR; INTRAVENOUS at 11:04

## 2020-01-27 RX ADMIN — FENTANYL CITRATE 100 MCG: 50 INJECTION INTRAMUSCULAR; INTRAVENOUS at 10:38

## 2020-01-27 ASSESSMENT — PULMONARY FUNCTION TESTS
PIF_VALUE: 15
PIF_VALUE: 15
PIF_VALUE: 0
PIF_VALUE: 14
PIF_VALUE: 14
PIF_VALUE: 16
PIF_VALUE: 13
PIF_VALUE: 12
PIF_VALUE: 16
PIF_VALUE: 16
PIF_VALUE: 15
PIF_VALUE: 15
PIF_VALUE: 16
PIF_VALUE: 15
PIF_VALUE: 16
PIF_VALUE: 16
PIF_VALUE: 15
PIF_VALUE: 15
PIF_VALUE: 13
PIF_VALUE: 15
PIF_VALUE: 16
PIF_VALUE: 15
PIF_VALUE: 15
PIF_VALUE: 13
PIF_VALUE: 13
PIF_VALUE: 16
PIF_VALUE: 15
PIF_VALUE: 16
PIF_VALUE: 13
PIF_VALUE: 17
PIF_VALUE: 16
PIF_VALUE: 12
PIF_VALUE: 12
PIF_VALUE: 15
PIF_VALUE: 13
PIF_VALUE: 16
PIF_VALUE: 13
PIF_VALUE: 16
PIF_VALUE: 15
PIF_VALUE: 16
PIF_VALUE: 16
PIF_VALUE: 14
PIF_VALUE: 13
PIF_VALUE: 16
PIF_VALUE: 20
PIF_VALUE: 16
PIF_VALUE: 15
PIF_VALUE: 0
PIF_VALUE: 16
PIF_VALUE: 15
PIF_VALUE: 1
PIF_VALUE: 15
PIF_VALUE: 16
PIF_VALUE: 13
PIF_VALUE: 15
PIF_VALUE: 15
PIF_VALUE: 1
PIF_VALUE: 16
PIF_VALUE: 15
PIF_VALUE: 16
PIF_VALUE: 15
PIF_VALUE: 13
PIF_VALUE: 15
PIF_VALUE: 16
PIF_VALUE: 17
PIF_VALUE: 16
PIF_VALUE: 13
PIF_VALUE: 16
PIF_VALUE: 15
PIF_VALUE: 15
PIF_VALUE: 0
PIF_VALUE: 15
PIF_VALUE: 16
PIF_VALUE: 16
PIF_VALUE: 15
PIF_VALUE: 15
PIF_VALUE: 3
PIF_VALUE: 15
PIF_VALUE: 20
PIF_VALUE: 15
PIF_VALUE: 16
PIF_VALUE: 16
PIF_VALUE: 0
PIF_VALUE: 13
PIF_VALUE: 19
PIF_VALUE: 16
PIF_VALUE: 13
PIF_VALUE: 16
PIF_VALUE: 15
PIF_VALUE: 13
PIF_VALUE: 16
PIF_VALUE: 13
PIF_VALUE: 16
PIF_VALUE: 16
PIF_VALUE: 19
PIF_VALUE: 15
PIF_VALUE: 16
PIF_VALUE: 15
PIF_VALUE: 15
PIF_VALUE: 13
PIF_VALUE: 15
PIF_VALUE: 5
PIF_VALUE: 15
PIF_VALUE: 13
PIF_VALUE: 16
PIF_VALUE: 15
PIF_VALUE: 16
PIF_VALUE: 12
PIF_VALUE: 15
PIF_VALUE: 16
PIF_VALUE: 15
PIF_VALUE: 0
PIF_VALUE: 13
PIF_VALUE: 17
PIF_VALUE: 16
PIF_VALUE: 15
PIF_VALUE: 16
PIF_VALUE: 2
PIF_VALUE: 15
PIF_VALUE: 16
PIF_VALUE: 15
PIF_VALUE: 16
PIF_VALUE: 15
PIF_VALUE: 16
PIF_VALUE: 19
PIF_VALUE: 16
PIF_VALUE: 13
PIF_VALUE: 16
PIF_VALUE: 16
PIF_VALUE: 15
PIF_VALUE: 16
PIF_VALUE: 13
PIF_VALUE: 16
PIF_VALUE: 13
PIF_VALUE: 16
PIF_VALUE: 15
PIF_VALUE: 16
PIF_VALUE: 15
PIF_VALUE: 15
PIF_VALUE: 16
PIF_VALUE: 16
PIF_VALUE: 15
PIF_VALUE: 13
PIF_VALUE: 16
PIF_VALUE: 16
PIF_VALUE: 1
PIF_VALUE: 16
PIF_VALUE: 13
PIF_VALUE: 4
PIF_VALUE: 16
PIF_VALUE: 15
PIF_VALUE: 16
PIF_VALUE: 15
PIF_VALUE: 13
PIF_VALUE: 16
PIF_VALUE: 16
PIF_VALUE: 15
PIF_VALUE: 16
PIF_VALUE: 13
PIF_VALUE: 6
PIF_VALUE: 15
PIF_VALUE: 16
PIF_VALUE: 22
PIF_VALUE: 16
PIF_VALUE: 15
PIF_VALUE: 13
PIF_VALUE: 15
PIF_VALUE: 16
PIF_VALUE: 15
PIF_VALUE: 15
PIF_VALUE: 16
PIF_VALUE: 16
PIF_VALUE: 14
PIF_VALUE: 15
PIF_VALUE: 16
PIF_VALUE: 13
PIF_VALUE: 16
PIF_VALUE: 12
PIF_VALUE: 16
PIF_VALUE: 0
PIF_VALUE: 16
PIF_VALUE: 15

## 2020-01-27 NOTE — ANESTHESIA PRE PROCEDURE
Min PRN Irina Mcdermott MD        HYDROmorphone (DILAUDID) injection 0.5 mg  0.5 mg Intravenous Q5 Min PRN Irina Mcdermott MD        morphine injection 1 mg  1 mg Intravenous Q5 Min PRN Irina Mcdermott MD        HYDROmorphone (DILAUDID) injection 0.5 mg  0.5 mg Intravenous Q5 Min PRN Irina Mcdermott MD        oxyCODONE (ROXICODONE) immediate release tablet 5 mg  5 mg Oral PRN Irina Mcdermott MD        Or    oxyCODONE (ROXICODONE) immediate release tablet 10 mg  10 mg Oral PRN Irina Mcdermott MD        diphenhydrAMINE (BENADRYL) injection 12.5 mg  12.5 mg Intravenous Once PRN Irina Mcdermott MD        metoclopramide (REGLAN) injection 10 mg  10 mg Intravenous Once PRN Irina Mcdermott MD        promethazine (PHENERGAN) injection 6.25 mg  6.25 mg Intravenous Once PRN Irina Mcdermott MD        labetalol (NORMODYNE;TRANDATE) injection syringe 5 mg  5 mg Intravenous Q10 Min PRN Irina Mcdermott MD        hydrALAZINE (APRESOLINE) injection 5 mg  5 mg Intravenous Q10 Min PRN Irina Mcdermott MD        meperidine (DEMEROL) injection 12.5 mg  12.5 mg Intravenous Q5 Min PRN Irina Mcdermott MD           Allergies:  No Known Allergies    Problem List:    Patient Active Problem List   Diagnosis Code    Essential hypertension I10    Poison ivy dermatitis L23.7    Hypercholesterolemia E78.00    Obstructive sleep apnea G47.33    Allergic rhinitis J30.9    Class 1 obesity without serious comorbidity with body mass index (BMI) of 32.0 to 32.9 in adult E66.9, Z68.32       Past Medical History:        Diagnosis Date    Colon polyps 7.26.06    x2    Hypertension     resolved with weight loss    Malaria 2011    joyce    Obstructive sleep apnea     CPAP user    Obstructive sleep apnea (adult) (pediatric)        Past Surgical History:        Procedure Laterality Date    CYST REMOVAL  1997    back x 2    LASIK  2001    SHOULDER ARTHROSCOPY      TYMPANOSTOMY TUBE PLACEMENT  2002       Social History: Social History     Tobacco Use    Smoking status: Never Smoker    Smokeless tobacco: Never Used   Substance Use Topics    Alcohol use: Yes     Alcohol/week: 0.0 standard drinks     Types: 3 Glasses of wine per week     Comment: rare                                Counseling given: Not Answered      Vital Signs (Current):   Vitals:    01/27/20 1008 01/27/20 1035 01/27/20 1040 01/27/20 1045   BP: (!) 144/91 (!) 147/85 138/81 127/86   Pulse: 68 59 64 63   Resp: 16 16 13 11   Temp: 97.5 °F (36.4 °C)      TempSrc: Oral      SpO2: 98% 97% 94% 96%   Weight: 265 lb (120.2 kg)      Height: 6' 2\" (1.88 m)                                                 BP Readings from Last 3 Encounters:   01/27/20 127/86   01/15/20 (!) 144/92   01/09/20 (!) 148/93       NPO Status: Time of last liquid consumption: 0600(sips with meds)                        Time of last solid consumption: 2200                        Date of last liquid consumption: 01/27/20                        Date of last solid food consumption: 01/26/20    BMI:   Wt Readings from Last 3 Encounters:   01/27/20 265 lb (120.2 kg)   01/15/20 270 lb (122.5 kg)   01/09/20 268 lb 12.8 oz (121.9 kg)     Body mass index is 34.02 kg/m².     CBC:   Lab Results   Component Value Date    WBC 6.6 01/09/2020    RBC 4.89 01/09/2020    HGB 14.6 01/09/2020    HCT 43.3 01/09/2020    MCV 88.4 01/09/2020    RDW 14.0 01/09/2020     01/09/2020       CMP:   Lab Results   Component Value Date     01/09/2020    K 5.0 01/09/2020     01/09/2020    CO2 23 01/09/2020    BUN 14 01/09/2020    CREATININE 0.9 01/09/2020    GFRAA >60 01/09/2020    AGRATIO 1.7 03/16/2015    LABGLOM >60 01/09/2020    LABGLOM 84 04/28/2014    GLUCOSE 84 01/09/2020    PROT 6.8 04/24/2019    CALCIUM 9.9 01/09/2020    BILITOT 0.7 04/24/2019    ALKPHOS 41 04/24/2019    AST 16 04/24/2019    ALT 8 04/24/2019       POC Tests: No results for input(s): POCGLU, POCNA, POCK, POCCL, POCBUN, POCHEMO, POCHCT in the last 72 hours. Coags: No results found for: PROTIME, INR, APTT    HCG (If Applicable): No results found for: PREGTESTUR, PREGSERUM, HCG, HCGQUANT     ABGs: No results found for: PHART, PO2ART, HRN9KPX, PAQ3YAH, BEART, S5ZKXRPX     Type & Screen (If Applicable):  No results found for: LABABO, [address]    Anesthesia Evaluation  Patient summary reviewed and Nursing notes reviewed no history of anesthetic complications:   Airway: Mallampati: II  TM distance: >3 FB   Neck ROM: full  Mouth opening: > = 3 FB Dental:          Pulmonary:   (+) sleep apnea:                             Cardiovascular:    (+) hypertension:,                   Neuro/Psych:   Negative Neuro/Psych ROS              GI/Hepatic/Renal: Neg GI/Hepatic/Renal ROS            Endo/Other: Negative Endo/Other ROS                    Abdominal:           Vascular: negative vascular ROS. Anesthesia Plan      general     ASA 3    (28-year-old male presents for right shoulder arthroscopy with possible rotator cuff repair. Plan general anesthesia with ASA standard monitors; interscalene brachial plexus block for postoperative analgesia upon request of the attending surgeon. Questions answered. Patient agreeable with anesthetic plan.   )  Induction: intravenous. Anesthetic plan and risks discussed with patient. Plan discussed with CRNA.     Attending anesthesiologist reviewed and agrees with Pre Eval content        Cathie Lam MD   1/27/2020

## 2020-01-27 NOTE — ANESTHESIA PROCEDURE NOTES
Peripheral Block    Patient location during procedure: pre-op  Start time: 1/27/2020 10:38 AM  End time: 1/27/2020 10:45 AM  Staffing  Anesthesiologist: Tutu Ching MD  Performed: anesthesiologist   Preanesthetic Checklist  Completed: patient identified, site marked, surgical consent, pre-op evaluation, timeout performed, IV checked, risks and benefits discussed, monitors and equipment checked, anesthesia consent given, oxygen available and patient being monitored  Peripheral Block  Patient position: sitting  Prep: ChloraPrep  Patient monitoring: cardiac monitor, continuous pulse ox, frequent blood pressure checks and IV access  Block type: Brachial plexus  Laterality: right  Injection technique: single-shot  Procedures: ultrasound guided  Interscalene  Provider prep: mask and sterile gloves  Needle  Needle type: short-bevel   Needle gauge: 22 G  Needle length: 8 cm  Needle localization: ultrasound guidance  Assessment  Injection assessment: negative aspiration for heme, no paresthesia on injection and local visualized surrounding nerve on ultrasound  Paresthesia pain: none  Slow fractionated injection: yes  Hemodynamics: stable  Additional Notes  Immediately prior to procedure a \"time out\" was called to verify the correct patient, allergies, laterality, procedure and equipment. Time out performed with RN. Local Anesthetic: See below. Anterior scalene and middle scalene muscles, upper, middle and lower trunks of the brachial plexus are identified, the tip of the needle and the spread of the local anesthetic around the brachial plexus are visualized. The Brachial plexus appeared to be anatomically normal and there were no abnormal pathologically findings seen. Right Ultrasound-Guided Interscalene Brachial Plexus Block    Indication: Postoperative analgesia upon request of the attending surgeon. Procedure: Informed consent obtained and pre-procedural timeout procedure performed.   Patient supine in semi-upright position. Landmarks identified. Sterile prep. Right brachial plexus was identified using an ultrasound probe and an 80mm 22G insulated regional block needle was inserted posterior to the right interscalene groove at the level of the C6 tubercle and directed in an anterior manner toward the brachial plexus. The needle was visualized on ultrasound as it entered the plexus sheath. Ropivacaine 0.5% was injected in 5cc increments to a total volume of 30cc after aspiration was performed prior to each increment and found to be negative for both heme and CSF. Block was tolerated well by the patient. There were no apparent complications at the time of the block. Reason for block: post-op pain management and at surgeon's request            Courtney Ramirez.  Barbra Trimble MD  January 27, 2020 11:02 AM

## 2020-01-27 NOTE — PROGRESS NOTES
Patient arrived from OR to PACU spot 6. Attached to monitoring system. Report received per CRNA. No problems reported intraoperatively. VSS. Patient denies any pain at this time. Status stable at this time.

## 2020-01-27 NOTE — BRIEF OP NOTE
Brief Postoperative Note  ______________________________________________________________    Patient: Robb Lai  YOB: 1950  MRN: [id number]  Date of Procedure: 1/27/2020    Pre-Op Diagnosis: Acute pain of right shoulder [M25.511]    Post-Op Diagnosis: Same       Procedure(s):  RIGHT SHOULDER DIAGNOSTIC ARTHROSCOPY, EUA, MALENA, REVISION SUBACROMIAL DECOMPRESSION,  EXTENSIVE DEBRIDEMENT, REVISION ROTATOR CUFF REPAIR X 2, WITH COLLOGEN GRAFT AUGMENTATION    Anesthesia: General    Surgeon(s):  MD Dipesh Garner MD Bobette Georgis, MD    Assistant: Martha Gagnon    Estimated Blood Loss (mL): 26-23EY    Complications: none    Specimens:   * No specimens in log *    Implants:  * No implants in log *      Drains: * No LDAs found *    Findings: Please refer to dictated operative report for details    Rosa Elena Ivey MD  Date: 1/27/2020  Time: 3:31 PM

## 2020-01-27 NOTE — H&P
[address]    [phone]    [facility] Same Day Surgery Update H & P  Department of General Surgery   Surgical Service   Pre-operative History and Physical  Last H & P within the last 30 days. DIAGNOSIS:   Acute pain of right shoulder [M25.511]    PROCEDURE:  [place] SHLDR ARTHROSCOP,SURG,W/ROTAT CUFF REPR [[id number]] (RIGHT SHOULDER ARTHROSCOPY DEBRIDEMENT, DECOMPRESSION, ROTATOR CUFF REPAIR WITH GRAT AUGMENTATION)     HISTORY OF PRESENT ILLNESS:    Patient with right shoulder pain, weakness and limited ROM in the setting of MRI demosntrated rotator cuff tear. The symptoms have been recalcitrant to conservative treatment and the patient presents today for the above procedure. Past Medical History:        Diagnosis Date    Colon polyps 7.26.06    x2    Hypertension     resolved with weight loss    Malaria 2011    [facility]    Obstructive sleep apnea     CPAP user    Obstructive sleep apnea (adult) (pediatric)      Past Surgical History:        Procedure Laterality Date    CYST REMOVAL  1997    back x 2    LASIK  2001    SHOULDER ARTHROSCOPY      TYMPANOSTOMY TUBE PLACEMENT  2002     Past Social History:  Social History     Socioeconomic History    Marital status:      Spouse name: Harry Ruiz Number of children: 3    Years of education: Not on file    Highest education level: Not on file   Occupational History    Occupation: retired     Comment: [employer]   Social Needs    Financial resource strain: Not on file    Food insecurity:     Worry: Not on file     Inability: Not on file   Mi[place] needs:     Medical: Not on file     Non-medical: Not on file   Tobacco Use    Smoking status: Never Smoker    Smokeless tobacco: Never Used   Substance and Sexual Activity    Alcohol use:  Yes     Alcohol/week: 0.0 standard drinks     Types: 3 Glasses of wine per week     Comment: rare    Drug use: No    Sexual activity: Yes     Partners: Female     Comment: M ,4 kids 27 H,55,81,72 yo

## 2020-01-28 ENCOUNTER — TELEPHONE (OUTPATIENT)
Dept: ORTHOPEDIC SURGERY | Age: 70
End: 2020-01-28

## 2020-01-28 ENCOUNTER — OFFICE VISIT (OUTPATIENT)
Dept: ORTHOPEDIC SURGERY | Age: 70
End: 2020-01-28

## 2020-01-28 VITALS
HEIGHT: 74 IN | DIASTOLIC BLOOD PRESSURE: 98 MMHG | BODY MASS INDEX: 34.01 KG/M2 | SYSTOLIC BLOOD PRESSURE: 150 MMHG | HEART RATE: 81 BPM | WEIGHT: 264.99 LBS

## 2020-01-28 PROCEDURE — MISCCOLD COLD THERAPY UNIT AND PAD: Performed by: ORTHOPAEDIC SURGERY

## 2020-01-28 PROCEDURE — 99024 POSTOP FOLLOW-UP VISIT: CPT | Performed by: ORTHOPAEDIC SURGERY

## 2020-01-28 RX ORDER — OXYCODONE AND ACETAMINOPHEN 10; 325 MG/1; MG/1
1 TABLET ORAL EVERY 4 HOURS PRN
Qty: 28 TABLET | Refills: 0 | Status: SHIPPED | OUTPATIENT
Start: 2020-01-28 | End: 2020-02-04

## 2020-01-28 NOTE — OP NOTE
work  might be needed. He wished to proceed and gave written informed  consent. He was scheduled on an elective basis after appropriate  preoperative medical clearance. DESCRIPTION OF PROCEDURE:  On the date of the procedure, the patient was  taken to the operating room and placed supine on the operating table. General anesthesia was established. Preoperative antibiotics and  interscalene block were placed in the holding area. Under anesthesia,  exam was carried out with findings noted above. The patient was  positioned in the [employer] chair position in the sitting position. All pressure points were padded. The patient's right shoulder and arm  were prepped and draped in the standard manner for arthroscopic shoulder  surgery. We used a [employer] Spider arm belle to set the arm in position. We marked the arthroscopic portals, employed these whenever possible. We began diagnostic arthroscopy. The arthroscope was introduced into  the glenohumeral joint through the standard posterolateral portal.   Systematic diagnostic arthroscopy ensued with findings as noted above. We established an anterior portal over the rotator interval.  Initially,  we were above the rotator cuff and then when we removed all the scar and  mobilized the subscapularis, we could go within the rotator interval.   We released extensive scarring in the rotator, this was done with  cautery. We then established accessory anterolateral portal, this  allowed us to grasp on to the tendon and mobilize it. We used a  [employer] Scorpion through that accessory anterolateral portal to pass  the suture through the subscapularis tendon near the comma-sign  tissue. This allowed us to pull on the suture and deliver the tendon to  the footprint. This was followed by extensive release of articular  side, bursal side adhesions, staying close to the labrum. We also  excised lateral labral fraying debriding with a shaver.   We completed  our diagnostic arthroscopy. There was a subtotal synovectomy  approximated with cautery and also shaver. We used a switching stick  technique as needed. We could see that there was a posterior superior  rotator cuff tear as well. We then redirected the arthroscope through  same posterior portal above the rotator cuff and into the subacromial  space. We established a lateral portal under direct visualization and  dilated with arthroscopic shaver and performed the initial bursectomy. We could see quite a bit of bursal adhesions, we could see that this was  a very large posterior superior rotator cuff tear, we had to repair as  well. We removed all the overlying scar tissue and debrided some of the  synovitis. We lightly repaired the footprint using a monroe. We brought  the arm and externally rotated, so we could see the subscapularis. There was some overlying scarring, so an accessory anterolateral portal  and low anterolateral portal were both established, this allowed us to  release all the adhesions and mobilize the subscapularis. Through the  low anterolateral portal, we placed two Arthrex BioComposite corkscrew  anchors lateral from the lesser tuberosity footprint, these were loaded  with three #2 FiberWire sutures. They were right close to the bicipital  groove. We used a PassPort cannula anterolateral through that, which we  wanted one of the suture passers through the medial distal end of the  subscapularis tear near the muscle tendon junction. We passed the second  timb to form a horizontal mattress suture. Proximal to that, we used two  simple sutures from that same anchor. We still were in the lower half  of the exposed subscapularis. We tied those with three sutures,  starting with the horizontal mattress suture to effect a reduction and  we left the tail short and then the other simple stitches were tied with  a knot on the tuberosity side and then the tail was cut short.   The  repair was carried out with the arm in about 20 to 30 degrees of  external rotation. The second anchor then placed also higher than the  bicipital groove, higher than the lesser tuberosity footprint, close to  the bicipital groove. We retrieved one limb of one of the sutures out  of the Passport cannula. We passed this through to the proximal medial  aspect of the rotator cuff tear. The second limb was passed anterior to  the first and so forth, the three simple stitches were then tied  arthroscopically. Again, the arm in external rotation. This completed  our two suture anchor six suture repair, simply the subscapularis. When  we brought the arm internally rotated, we could see the posterosuperior  rotator cuff tear. We exposed the footprint, lightly debrided with the  monroe. We removed all the scar with shaver and  the overlying  scar from the underlying tendons, this was done all the way  posterolateral. We placed a PassPort cannula laterally, and then we  retrieved one limb of the sutures out the PassPort cannula. We loaded  the Ar[employer]rex [employer] Scorpion suture passer and passed through the  posteromedial aspect of the rotator cuff and muscle-tendon junction. The second limb was passed posterior to the first with horizontal  mattress suture. We parked the tails at the accessory portals. We  repeated the process identically with the second and the third suture  from that posterior anchor and then two of the three sutures from the  anterior anchor passing over the anterior tail and more anteromedially. One suture was not used at this point. We tied our arthroscopic notch. We used a Revo knot followed by alternating half-hitches, parked the  tails. We tied the first, second, third, fourth and fifth sutures and  parked the suture tails at the accessory portals.   There was still room  for one more suture centrally, so I took the limb from the remaining  suture anteriorly and passed those through the rotator cuff a little bit  more centrally and a little bit more laterally. That knot was tied as  well. We then grouped the one limb form each of the stitches and loaded  these on a SwiveLock anchor placed through a punch hole posterolateral.   We left the tails short and the remaining six strands were loaded onto a  second SwiveLock anchor placed through a punch hole anterolateral around  bicipital groove. At the end of this, we could see there is still room  for one more anchor, we did address the dog ears both posteriorly and  anteriorly, so we placed an anchor centrally between the two lateral  anchors, this was a triple loaded 5.5 BioComposite corkscrew anchor,  which was the fifth of that type of anchor and these were passed in  simple fashion to the posterior rotator cuff leaflet and through the  exposed rotator interval and leaflet. Those notches were then tied  arthroscopically with the arm in the external rotation and slight  abduction. Very nice repair was affected. We had excellent coverage  and we could see the subscapularis repair. We actually looked at it  within the joint to confirm footprint restoration. We then augmented  the posterior superior rotator cuff repair with collagen patch  augmentation. While doing posteriorly, we deployed the graft and fixed  it to the tendon using multiple PLLA staples and then to bone using two  PEEK staples with good fixation. We copiously irrigated the subacromial  space, withdrew all the arthroscopic instruments, closed the  arthroscopic portals, anterolateral and posterior as well as low  anterolateral and high anterolateral and posterolateral, which was used  for suture management. These were all closed with 4-0 Monocryl closure  and Steri-Strips. A postop sterile compressive dressing was applied. The arm was placed in a padded soft brace.   The patient was repositioned  in the supine position before this, promptly awakened from anesthesia  having

## 2020-01-28 NOTE — PROGRESS NOTES
History of Present Illness:  Suzie Herman is a pleasant 77 y.o. male who presents for a post operative visit. He is 1 day out following a right shoulder arthroscopy for extensive debridement, revision subacromial decompression, removal of retained sutures, subtotal synovectomy, rotator cuff repair with collagen patch on 1/28/20. He has been compliant with wearing the UltraSling brace at all times. Since last night he has noticed a significant amount of bleeding, through the initial dressing. His wife packed on more dressing and it has begun to bleed through that as well. Additionally he reports significantly high pain levels of 9/10 at this time. He does have a scheduled postop next week but wanted to be seen sooner for a wound check. He denies fevers, chills, numbness, tingling, and shortness of breath. Medical History:  Patient's medications, allergies, past medical, surgical, social and family histories were reviewed and updated as appropriate. Review of Systems  A 14 point review of systems was completed by the patient on 12/11/19 and is available in the media section of the scanned medical record and was reviewed on 1/28/2020. Vital Signs:  Vitals:    01/28/20 1129   BP: (!) 150/98   Pulse: 81       General/Appearance: Alert and oriented and in no apparent distress. Skin:  There are no skin lesions, cellulitis, or extreme edema. The patient has warm and well-perfused Bilateral upper extremities with brisk capillary refill. Right Shoulder Exam:    Inspection: Shoulder incision is clean, dry and intact and well approximated. The Prineo dressing is saturated with blood, but still in place. Mild ecchymosis and swelling are present as can be expected. There is no erythema, drainage or other signs of infection    Palpation:  No crepitus to gentle motion    Active Range of Motion: Deferred    Passive Range of Motion:  Deferred    Strength:  Deferred    Special Tests:  Deferred.     Neurovascular: Sensation to light touch is intact, no motor deficits, palpable radial pulses 2+    Radiology:     No new XR obtained at this time. Assessment :  Mr. Rosa Vogt is a pleasant 77 y.o. patient who is 1 day out following a right shoulder arthroscopy for extensive debridement, revision subacromial decompression, removal of retained sutures, subtotal synovectomy, rotator cuff repair with collagen patch on 1/28/20. He has saturated the postoperative dressing but there is no sign of active bleeding. Impression:  Encounter Diagnoses   Name Primary?  Dysfunction of right rotator cuff Yes    S/P arthroscopy of right shoulder     S/P right rotator cuff repair        Office Procedures:  Orders Placed This Encounter   Procedures    Cold Therapy Unit and Pad $150     Scheduling Instructions:      Patient was supplied a Cold Therapy Unit and Pad. This retail item was supplied to provide functional support and assist in protecting the affected area. Verbal and written instructions for the use of and application of this item were provided. The patient was educated and fit by a healthcare professional with expert knowledge and specialization in brace application. They were instructed to contact the office immediately should the equipment result       in increased pain, decreased sensation, increased swelling or worsening of the condition. Treatment Plan:    We did remove the saturated dressing and replace it with new dressing at this time. We assured this patient that we expect the bleeding to gradually decrease. He should be icing frequently. We increased his dosage for percocet. We recommend that He wear the UltraSling brace at all times with the exception of clothing, bathing and physical therapy. The patient was told that he is restricted from driving for at least 3 weeks postop. All of his questions were fully answered today.  We would like to see Mildred Hardy back next week for his

## 2020-01-30 ENCOUNTER — HOSPITAL ENCOUNTER (OUTPATIENT)
Dept: PHYSICAL THERAPY | Age: 70
Setting detail: THERAPIES SERIES
Discharge: HOME | End: 2020-01-30
Payer: MEDICARE

## 2020-01-30 PROCEDURE — 97110 THERAPEUTIC EXERCISES: CPT | Performed by: PHYSICAL THERAPIST

## 2020-01-30 PROCEDURE — 97140 MANUAL THERAPY 1/> REGIONS: CPT | Performed by: PHYSICAL THERAPIST

## 2020-01-30 NOTE — FLOWSHEET NOTE
[facility]    Physical Therapy Daily Treatment Note  Date:  2020    Patient Name:  Fatoumata Smart    :  1950  MRN: [id number]  Restrictions/Precautions:    Medical/Treatment Diagnosis Information:  · Diagnosis: M25.511 (ICD-10-CM) - Acute pain of right shoulder  · S/p Arthroscopic revision of subacromial decompression, arthroscopic removal of retained sutures, arthroscopic subtotal synovectomy, arthroscopic repair of separate subscapularis and then supraspinatus/infraspinatus rotator cuff tendon tears with collagen patch augmentation for the latter DOS 20  · Treatment Diagnosis: M75.101, M25.511, R53.1 leading to impaired ADLs and IADLs  Insurance/Certification information:  PT Insurance Information: PT BENEFITS 2020 FACILITY/ MEDICARE PRIMARY/ PAYS 80%/ NO VISIT LIMIT MED NEC/ AARP SECONDARY INS/ 20 PAG  Physician Information:  Referring Practitioner: Sharon Boyle MD  Has the plan of care been signed (Y/N):        []  Yes  [x]  No     Date of Patient follow up with Physician: 20      Is this a Progress Report:     []  Yes  [x]  No        If Yes:  Date Range for reporting period:  Beginning  Ending    Progress report will be due (10 Rx or 30 days whichever is less): 65      Recertification will be due (POC Duration  / 90 days whichever is less): 20        Visit # Insurance Allowable Auth Required   1 MEDICARE []  Yes [x]  No        Functional Scale:    Date assessed:   UEFI 8/80=10% (90% deficit)   20    Latex Allergy:  [x]NO      []YES  Preferred Language for Healthcare:   [x]English       []other:    Pain level:  6/10     SUBJECTIVE:  See eval    OBJECTIVE:      Not assessed d/t recent surgery and MD restrictions          ROM PROM AROM  Comment     L R L R     Flexion             Abduction             ER             IR             Other  (cervical)             Other                 Not assessed d/t recent interventions                 Plan for next session: progress per MD protocol    Therapeutic Exercise and NMR EXR  [x] (51930) Provided verbal/tactile cueing for activities related to strengthening, flexibility, endurance, ROM  for improvements in scapular, scapulothoracic and UE control with self care, reaching, carrying, lifting, house/yardwork, driving/computer work.    [] (98203) Provided verbal/tactile cueing for activities related to improving balance, coordination, kinesthetic sense, posture, motor skill, proprioception  to assist with  scapular, scapulothoracic and UE control with self care, reaching, carrying, lifting, house/yardwork, driving/computer work. Therapeutic Activities:    [] (03691 or 73211) Provided verbal/tactile cueing for activities related to improving balance, coordination, kinesthetic sense, posture, motor skill, proprioception and motor activation to allow for proper function of scapular, scapulothoracic and UE control with self care, carrying, lifting, driving/computer work. Home Exercise Program: Pt access code: [email or web address]   Initiated 1/30/20. Printed hand out given. Pt demonstrated proper form of each exercise and expressed verbal understanding of frequency and duration.       [x] (32433) Reviewed/Progressed HEP activities related to strengthening, flexibility, endurance, ROM of scapular, scapulothoracic and UE control with self care, reaching, carrying, lifting, house/yardwork, driving/computer work  [] (15496) Reviewed/Progressed HEP activities related to improving balance, coordination, kinesthetic sense, posture, motor skill, proprioception of scapular, scapulothoracic and UE control with self care, reaching, carrying, lifting, house/yardwork, driving/computer work      Manual Treatments:    [] (17500) Provided manual therapy to mobilize soft tissue/joints of cervical/CT, scapular GHJ and UE for the purpose of modulating pain, promoting relaxation,  increasing ROM, reducing/eliminating soft tissue swelling/inflammation/restriction, improving soft tissue extensibility and allowing for proper ROM for normal function with self care, reaching, carrying, lifting, house/yardwork, driving/computer work    Modalities: Will ice at home    Charges:  Timed Code Treatment Minutes: 15   Total Treatment Minutes: 35   Time in: 11:03  Time out: 11:48    [x] EVAL (LOW) 30495 (typically 20 minutes face-to-face)  [] EVAL (MOD) 08401 (typically 30 minutes face-to-face)  [] EVAL (HIGH) 29056 (typically 45 minutes face-to-face)  [] RE-EVAL     [x] GP(84496) x  1   [] IONTO  [] NMR (96464) x     [] VASO  [] Manual (68606) x      [] Other:  [] TA x      [] Mech Traction (89657)  [] ES(attended) (29861)      [] ES (un) (53419):     GOALS:  Patient stated goal: no pain, better movement    []? Progressing: []? Met: []? Not Met: []? Adjusted     Therapist goals for Patient:   Short Term Goals: To be achieved in: 4-6 weeks 2/27/20-3/12/20  1. Independent in HEP and progression per patient tolerance, in order to prevent re-injury. []? Progressing: []? Met: []? Not Met: []? Adjusted    2. Patient will have a decrease in pain to facilitate improvement in movement, function, and ADLs as indicated by Functional Deficits. []? Progressing: []? Met: []? Not Met: []? Adjusted    3. Patient will tolerate progression from recliner to bed for sleeping.  []? Progressing: []? Met: []? Not Met: []? Adjusted      4. Patient will tolerate progression out of sling.  []? Progressing: []? Met: []? Not Met: []? Adjusted      Long Term Goals: To be achieved in: 6 months 7/16/20  1. Disability index score of 10% or less for the UEFS to assist with reaching prior level of function. []? Progressing: []? Met: []? Not Met: []? Adjusted  2. Patient will demonstrate increased AROM to WNL to allow for proper joint functioning as indicated by patients Functional Deficits. []? Progressing: []? Met: []?  Not Met: []?

## 2020-01-31 NOTE — ANESTHESIA POSTPROCEDURE EVALUATION
Department of Anesthesiology  Postprocedure Note    Patient: Blanca Little  MRN: [id number]  [place]: 1950  Date of evaluation: 1/30/2020  Time:  11:22 PM     Procedure Summary     Date:  01/27/20 Room / Location:  [address] [place] [address] [address] [place] [address] / [facility]    Anesthesia Start:  1104 Anesthesia Stop:  1457    Procedure:  RIGHT SHOULDER DIAGNOSTIC ARTHROSCOPY, EUA, MALENA, REVISION SUBACROMIAL DECOMPRESSION,  EXTENSIVE DEBRIDEMENT, REVISION ROTATOR CUFF REPAIR X 2, WITH COLLOGEN GRAFT AUGMENTATION (Right ) Diagnosis:       Acute pain of right shoulder      (Acute pain of right shoulder [M25.511])    Surgeon:  Liza Martínez MD Responsible Provider:  Mayuri Rodriguez MD    Anesthesia Type:  general ASA Status:  2          Anesthesia Type: general    Noelle Phase I: Noelle Score: 9    Noelle Phase II: Noelle Score: 10    Last vitals: Reviewed and per EMR flowsheets.        Anesthesia Post Evaluation    Patient location during evaluation: PACU  Patient participation: complete - patient participated  Level of consciousness: awake and alert  Pain score: 0  Airway patency: patent  Nausea & Vomiting: no nausea and no vomiting  Complications: no  Cardiovascular status: hemodynamically stable  Respiratory status: acceptable  Hydration status: euvolemic

## 2020-02-05 ENCOUNTER — HOSPITAL ENCOUNTER (OUTPATIENT)
Dept: PHYSICAL THERAPY | Age: 70
Setting detail: THERAPIES SERIES
Discharge: HOME | End: 2020-02-05
Payer: MEDICARE

## 2020-02-05 ENCOUNTER — OFFICE VISIT (OUTPATIENT)
Dept: ORTHOPEDIC SURGERY | Age: 70
End: 2020-02-05

## 2020-02-05 VITALS — BODY MASS INDEX: 34.01 KG/M2 | WEIGHT: 264.99 LBS | HEIGHT: 74 IN

## 2020-02-05 PROCEDURE — 99024 POSTOP FOLLOW-UP VISIT: CPT | Performed by: ORTHOPAEDIC SURGERY

## 2020-02-05 PROCEDURE — 97110 THERAPEUTIC EXERCISES: CPT | Performed by: PHYSICAL THERAPIST

## 2020-02-05 RX ORDER — OXYCODONE HYDROCHLORIDE AND ACETAMINOPHEN 5; 325 MG/1; MG/1
1 TABLET ORAL EVERY 6 HOURS PRN
Qty: 28 TABLET | Refills: 0 | Status: SHIPPED | OUTPATIENT
Start: 2020-02-05 | End: 2020-02-12

## 2020-02-05 NOTE — FLOWSHEET NOTE
The [address] [place] [address] and Sports Rehabilitation, Remy Campbell    Physical Therapy Daily Treatment Note  Date:  2020    Patient Name:  Tata Hubbard    :  1950  MRN: [id number]  Restrictions/Precautions:    Medical/Treatment Diagnosis Information:  · Diagnosis: M25.511 (ICD-10-CM) - Acute pain of right shoulder  · S/p Arthroscopic revision of subacromial decompression, arthroscopic removal of retained sutures, arthroscopic subtotal synovectomy, arthroscopic repair of separate subscapularis and then supraspinatus/infraspinatus rotator cuff tendon tears with collagen patch augmentation for the latter DOS 20  · Treatment Diagnosis: M75.101, M25.511, R53.1 leading to impaired ADLs and IADLs  Insurance/Certification information:  PT Insurance Information: PT BENEFITS  FACILITY/ MEDICARE PRIMARY/ PAYS 80%/ NO VISIT LIMIT MED NEC/ [employer] SECONDARY INS/ 20 PAG  Physician Information:  Referring Practitioner: Alfredo Ferris MD  Has the plan of care been signed (Y/N):        []  Yes  [x]  No     Date of Patient follow up with Physician: 20      Is this a Progress Report:     []  Yes  [x]  No        If Yes:  Date Range for reporting period:  Beginning  Ending    Progress report will be due (10 Rx or 30 days whichever is less): 3/7/07      Recertification will be due (POC Duration  / 90 days whichever is less): 20        Visit # Insurance Allowable Auth Required   2 MEDICARE []  Yes [x]  No        Functional Scale:    Date assessed:   UEFI 8/80=10% (90% deficit)   20    Latex Allergy:  [x]NO      []YES  Preferred Language for Healthcare:   [x]English       []other:    Pain level:  4-6/10     SUBJECTIVE:  Pt states that his shoulder has been feeling pretty good. His pain level is about 4-6/10. Pt continues to take pain medication as prescribed. Pt saw Dr. Dakota Valdez prior to PT session, states visit went well, is to f/uin 2 weeks.     OBJECTIVE:      Not assessed d/t recent surgery and MD restrictions          ROM PROM AROM  Comment     L R L R     Flexion             Abduction             ER             IR             Other  (cervical)             Other                 Not assessed d/t recent surgery and MD restrictions   Strength L R Comment   Flexion         Abduction         ER         IR         Supraspinatus         Upper Trap         Lower Trap         Mid Trap         Rhomboids         Biceps         Triceps         Horizontal Abduction         Horizontal Adduction         Lats            Not assessed d/t recent surgery and MD restrictions  Special Tests Results/Comment   Maria Esther     Neanthony     Speeds     [place]ri[place]     Apprehension      Load & Shift            Reflexes/Sensation:               [x]? Dermatomes/Myotomes intact Myotomes not assessed d/t recent surgery and MD restrictions              []? Reflexes equal and normal bilaterally Not assessed d/t recent surgery and MD restrictions              []? Other:     Joint mobility: Not assessed d/t recent surgery and MD restrictions              []? Normal               []? Hypo              []? Hyper     Palpation: Generalized TTP around shoulder     Functional Mobility/Transfers: Unable to use RUE d/t recent surgery     Posture: WNL     Bandages/Dressings/Incisions:   1/30/20 Post op dressings removed by MD on 1/28/20. Steri strips in place.        RESTRICTIONS/PRECAUTIONS: NO SHOULDER ROM    Exercises/Interventions:   Exercises:  Exercise/Equipment Resistance/Repetitions Other comments   Stretching/PROM     Wand     Table Slides     UE [place]     Pulleys     Pendulum     Elbow flex/ext     Wrist supination 10x10\" 1#         Isometrics     Retraction          Weight shift     Flexion     Abduction     External Rotation     Internal Rotation     Biceps     Triceps          PRE's     Wrist flex 3x10    Wrist ext 3x10    Flexion     Abduction     External Rotation     Internal Rotation     Shrugs 3x10    EXT     Reverse Flys mobility deficits. PLAN: See eval  [x] Continue per plan of care [] Alter current plan (see comments above)  [] Plan of care initiated [] Hold pending MD visit [] Discharge      Electronically signed by:  Berry Brunner, PT, DPT  Physical Therapist  [email or web address]  [email or web address]      Note: If patient does not return for scheduled/ recommended follow up visits, this note will serve as a discharge from care along with most recent update on progress.

## 2020-02-19 ENCOUNTER — OFFICE VISIT (OUTPATIENT)
Dept: ORTHOPEDIC SURGERY | Age: 70
End: 2020-02-19

## 2020-02-19 ENCOUNTER — HOSPITAL ENCOUNTER (OUTPATIENT)
Dept: PHYSICAL THERAPY | Age: 70
Setting detail: THERAPIES SERIES
Discharge: HOME | End: 2020-02-19
Payer: MEDICARE

## 2020-02-19 VITALS — WEIGHT: 264.99 LBS | HEIGHT: 74 IN | BODY MASS INDEX: 34.01 KG/M2

## 2020-02-19 PROCEDURE — 99024 POSTOP FOLLOW-UP VISIT: CPT | Performed by: ORTHOPAEDIC SURGERY

## 2020-02-19 PROCEDURE — 97110 THERAPEUTIC EXERCISES: CPT | Performed by: PHYSICAL THERAPIST

## 2020-02-19 NOTE — LETTER
Shoulder Elbow Rehabilitation Referral    Patient Name: Nazanin Saldana      YOB: 1950    Diagnosis:   1. S/P shoulder surgery    2. S/P arthroscopy of right shoulder        Precautions: RTC    Post Op Instructions:  [] Continuous passive motion (CPM)  [x] Elbow range of motion  [] Exercise in plane of scapula   []  Strengthening     [] Pulley and instruction    [x] Home exercise program (copy to patient)   [x] Sling when arm at risk  [] Sling or brace at all times   [x] AAROM: Forward elevation to 140            [x] AAROM: External rotation to 40    [] Isometric external rotator strengthening [] AAROM: internal rotation: up the back  [x] Isometric abductor strengthening  [] AAROM: Internal abduction     [] Isometric internal rotator strengthening [] AAROM: cross-body adduction             Stretching:     Strengthening:  [] Four quadrant (FE, ER, IR, CBA)  [] Rotator cuff (ER, IR, Abd)  [] Forward Elevation    [] External Rotators     [] External Rotation    [] Internal Rotators  [] Internal Rotation: up/back   [] Abductors     [] Internal Rotation: supine in abduction  [] Flexors  [] Cross-body abduction    [] Extensors  [x] Pendulum (FE, Abd/Add, cw/ccw)  [x] Scapular Stabilizers   [] Wall-walking (FE, Abd)    [x] Shoulder shrugs     [] Table slides      [x] Rhomboid pinch  [] Elbow (flex, ext, pron, sup)    [] Lat.  Pull downs     [] Medial epicondylitis program    [] Forward punch   [] Lateral epicondylitis program    [] Internal rotators     [] Progressive resistive exercises  [] Bench Press        [] Bench press plus  Activities:     [] Lateral pull-downs  [] Rowing     [] Progressive two-hand supine press  [] Stepper/Exercise bike   [] Biceps: curls/supination  [] Swimming  [] Water exercises    Modalities: PRN    Return to Sport:  [] Ultrasound     [] Plyometrics  [] Iontophoresis     [] Rhythmic stabilization  [] Moist heat     [] Core strengthening [] Massage     [] Sports specific program:   [x] Cryotherapy      [] Electrical stimulation     [] Paraffin  [] Whirlpool  [] TENS    [x] Home exercise program (copy to patient). Perform exercises for:   15     minutes    2-3      times/day  [x] Supervised physical therapy  Frequency: []  1x week  [x] 2x week  [] 3x week  [] Other:   Duration: [] 2 weeks   [] 4 weeks  [x] 6 weeks  [] Other:     Additional Instructions:   Gentle manual stretch by therapist        Lizzie Oppenheim.  Emma Lewis MD, PhD

## 2020-02-19 NOTE — FLOWSHEET NOTE
The [facility]    Physical Therapy Daily Treatment Note  Date:  2020    Patient Name:  Robb Lai    :  1950  MRN: [id number]  Restrictions/Precautions:    Medical/Treatment Diagnosis Information:  · Diagnosis: M25.511 (ICD-10-CM) - Acute pain of right shoulder  · S/p Arthroscopic revision of subacromial decompression, arthroscopic removal of retained sutures, arthroscopic subtotal synovectomy, arthroscopic repair of separate subscapularis and then supraspinatus/infraspinatus rotator cuff tendon tears with collagen patch augmentation for the latter DOS 20  · Treatment Diagnosis: M75.101, M25.511, R53.1 leading to impaired ADLs and IADLs  Insurance/Certification information:  PT Insurance Information: PT BENEFITS 2020 FACILITY/ MEDICARE PRIMARY/ PAYS 80%/ NO VISIT LIMIT MED NEC/ [employer] SECONDARY INS/ 20 PAG  Physician Information:  Referring Practitioner: Dipesh Thorpe MD  Has the plan of care been signed (Y/N):        []  Yes  [x]  No     Date of Patient follow up with Physician: 20      Is this a Progress Report:     []  Yes  [x]  No        If Yes:  Date Range for reporting period:  Beginning  Ending    Progress report will be due (10 Rx or 30 days whichever is less): 26      Recertification will be due (POC Duration  / 90 days whichever is less): 20        Visit # Insurance Allowable Auth Required   3 MEDICARE []  Yes [x]  No        Functional Scale:    Date assessed:   UEFI 8/80=10% (90% deficit)   20    Latex Allergy:  [x]NO      []YES  Preferred Language for Healthcare:   [x]English       []other:    Pain level:  2-3/10     SUBJECTIVE:  3 weeks po. Pt states that he shoulder has been feeling better, taking 1 pain pill before bed to help with sleeping. Pt saw MD prior to PT session, pleased with progress, may begin gentle shoulder PROM. Pt will be able out of town for 2.5 weeks as of .     OBJECTIVE:      Not Abduction     External Rotation     Internal Rotation     Shrugs 3x10 1#    EXT     Reverse Flys     Serratus     Horizontal Abd with ER     Biceps 3x10 0#    Triceps     Retraction          Cable Column/Theraband     External Rotation     Internal Rotation     Shrugs     Lats     Ext     Flex     Scapular Retraction 3x10 blue Shoulder blade squeeze only   BIC     TRIC     PNF          Dynamic Stability          Plyoback          Manual interventions                 Plan for next session: progress per MD protocol    Therapeutic Exercise and NMR EXR  [x] (72009) Provided verbal/tactile cueing for activities related to strengthening, flexibility, endurance, ROM  for improvements in scapular, scapulothoracic and UE control with self care, reaching, carrying, lifting, house/yardwork, driving/computer work.    [] (95849) Provided verbal/tactile cueing for activities related to improving balance, coordination, kinesthetic sense, posture, motor skill, proprioception  to assist with  scapular, scapulothoracic and UE control with self care, reaching, carrying, lifting, house/yardwork, driving/computer work. Therapeutic Activities:    [] (09374 or 07921) Provided verbal/tactile cueing for activities related to improving balance, coordination, kinesthetic sense, posture, motor skill, proprioception and motor activation to allow for proper function of scapular, scapulothoracic and UE control with self care, carrying, lifting, driving/computer work. Home Exercise Program: Pt access code: [email or web address]   Initiated 1/30/20. Printed hand out given. Pt demonstrated proper form of each exercise and expressed verbal understanding of frequency and duration.       [x] (37284) Reviewed/Progressed HEP activities related to strengthening, flexibility, endurance, ROM of scapular, scapulothoracic and UE control with self care, reaching, carrying, lifting, house/yardwork, driving/computer work  [] (33792) Reviewed/Progressed HEP activities restrictions at this time. Pt tolerated exercise program well. Required VCs for shoulder blade squeeze only during resisted retraction and to avoid pulling arms back into row. Pt will be out of town for 2.5 weeks, educated on appropriate frequency/duration of HEP. Pt requires PT follow up to address ROM, strength and functional mobility deficits. PLAN: See eval  [x] Continue per plan of care [] Alter current plan (see comments above)  [] Plan of care initiated [] Hold pending MD visit [] Discharge      Electronically signed by:  Sariah Paze PT, DPT  Physical Therapist  [email or web address]  [email or web address]      Note: If patient does not return for scheduled/ recommended follow up visits, this note will serve as a discharge from care along with most recent update on progress.

## 2020-02-19 NOTE — PROGRESS NOTES
Medicine     During this examination, I, Daniella Powers ATC, functioned as a scribe for Dr. Hunter Lacy. The history taking and physical examination were performed by Dr. Netta Willson. All counseling during the appointment was performed between the patient and Dr. Netta Willson. 2/19/2020   __________________  I, Dr. Hunter Lacy, personally performed the services described in this documentation as described by Daniella Powers ATC in my presence, and it is both accurate and complete. Samewilfrid Willson MD, PhD  2/19/2020

## 2020-03-10 ENCOUNTER — HOSPITAL ENCOUNTER (OUTPATIENT)
Dept: PHYSICAL THERAPY | Age: 70
Setting detail: THERAPIES SERIES
Discharge: HOME | End: 2020-03-10
Payer: MEDICARE

## 2020-03-10 PROCEDURE — 97110 THERAPEUTIC EXERCISES: CPT | Performed by: PHYSICAL THERAPIST

## 2020-03-10 PROCEDURE — 97140 MANUAL THERAPY 1/> REGIONS: CPT | Performed by: PHYSICAL THERAPIST

## 2020-03-10 NOTE — FLOWSHEET NOTE
Cable Column/Theraband     External Rotation     Internal Rotation     Shrugs     Lats     Ext     Flex     Scapular Retraction 3x10 blue Shoulder blade squeeze only   BIC     TRIC Blue 3x10    PNF          Dynamic Stability          Plyoback          Manual interventions     Manual PROM All directions           Plan for next session: progress per MD protocol    Patient Education:  Access Code: [email or web address]   URL: [email or web address]   Date: 03/10/2020   Prepared by: Taqueria Ji     Exercises   Standing Shoulder Row with Anchored Resistance   Seated Shoulder Flexion Towel Slide at Table Top -   Supine Shoulder External Rotation with Dowel -   Seated Shoulder Abduction Towel Slide at Table Top -   Standing Tricep Extensions with Resistance -    Standing Bicep Curls Supinated with Dumbbells -   Standing Single Arm Shoulder Shrug Circles AROM Forward -    Therapeutic Exercise and NMR EXR  [x] (28707) Provided verbal/tactile cueing for activities related to strengthening, flexibility, endurance, ROM  for improvements in scapular, scapulothoracic and UE control with self care, reaching, carrying, lifting, house/yardwork, driving/computer work.    [] (56319) Provided verbal/tactile cueing for activities related to improving balance, coordination, kinesthetic sense, posture, motor skill, proprioception  to assist with  scapular, scapulothoracic and UE control with self care, reaching, carrying, lifting, house/yardwork, driving/computer work. Therapeutic Activities:    [] (48577 or 43573) Provided verbal/tactile cueing for activities related to improving balance, coordination, kinesthetic sense, posture, motor skill, proprioception and motor activation to allow for proper function of scapular, scapulothoracic and UE control with self care, carrying, lifting, driving/computer work. Home Exercise Program: Pt access code: [email or web address]   Initiated 1/30/20. Printed hand out given.  Pt demonstrated proper form of each exercise and expressed verbal understanding of frequency and duration. [x] (28360) Reviewed/Progressed HEP activities related to strengthening, flexibility, endurance, ROM of scapular, scapulothoracic and UE control with self care, reaching, carrying, lifting, house/yardwork, driving/computer work  [] (90755) Reviewed/Progressed HEP activities related to improving balance, coordination, kinesthetic sense, posture, motor skill, proprioception of scapular, scapulothoracic and UE control with self care, reaching, carrying, lifting, house/yardwork, driving/computer work      Manual Treatments:    [x] (71564) Provided manual therapy to mobilize soft tissue/joints of cervical/CT, scapular GHJ and UE for the purpose of modulating pain, promoting relaxation,  increasing ROM, reducing/eliminating soft tissue swelling/inflammation/restriction, improving soft tissue extensibility and allowing for proper ROM for normal function with self care, reaching, carrying, lifting, house/yardwork, driving/computer work    Modalities: Will ice at home    Charges:  Timed Code Treatment Minutes: 45   Total Treatment Minutes: 60       [] EVAL (LOW) 08838 (typically 20 minutes face-to-face)  [] EVAL (MOD) 31152 (typically 30 minutes face-to-face)  [] EVAL (HIGH) 02553 (typically 45 minutes face-to-face)  [] RE-EVAL     [x] IR(63220) x  2   [] IONTO  [] NMR (91576) x     [] VASO  [x] Manual (83891) x  1    [] Other:  [] TA x      [] Mech Traction (62664)  [] ES(attended) (31375)      [] ES (un) (84567):     GOALS:  Patient stated goal: no pain, better movement    []? Progressing: []? Met: []? Not Met: []? Adjusted     Therapist goals for Patient:   Short Term Goals: To be achieved in: 4-6 weeks 2/27/20-3/12/20  1. Independent in HEP and progression per patient tolerance, in order to prevent re-injury. []? Progressing: []? Met: []? Not Met: []? Adjusted    2.  Patient will have a decrease in pain to facilitate improvement in movement, function, and ADLs as indicated by Functional Deficits. []? Progressing: []? Met: []? Not Met: []? Adjusted    3. Patient will tolerate progression from recliner to bed for sleeping.  []? Progressing: []? Met: []? Not Met: []? Adjusted      4. Patient will tolerate progression out of sling.  []? Progressing: []? Met: []? Not Met: []? Adjusted      Long Term Goals: To be achieved in: 6 months 7/16/20  1. Disability index score of 10% or less for the UEFS to assist with reaching prior level of function. []? Progressing: []? Met: []? Not Met: []? Adjusted  2. Patient will demonstrate increased AROM to WNL to allow for proper joint functioning as indicated by patients Functional Deficits. []? Progressing: []? Met: []? Not Met: []? Adjusted  3. Patient will demonstrate an increase in RUE strength to good scapular and core control  for UE to allow for proper functional mobility as indicated by patients Functional Deficits. []? Progressing: []? Met: []? Not Met: []? Adjusted  4. Patient will return to ADLs, IADLs and functional activities without increased symptoms or restriction. []? Progressing: []? Met: []? Not Met: []? Adjusted  5. Patient will tolerate progressive return to light weight lifting and swimming. []? Progressing: []? Met: []? Not Met: []? Adjusted    Overall Progression Towards Functional goals/ Treatment Progress Update:  [x] Patient is progressing as expected towards functional goals listed. [] Progression is slowed due to complexities/Impairments listed. [] Progression has been slowed due to co-morbidities.   [] Plan just implemented, too soon to assess goals progression <30days   [] Goals require adjustment due to lack of progress  [] Patient is not progressing as expected and requires additional follow up with physician  [] Other    Prognosis for POC: [x] Good [] Fair  [] Poor      Patient requires continued skilled intervention: [x] Yes  [] No    Treatment/Activity Tolerance:  [] Patient able to complete treatment  [] Patient limited by fatigue  [x] Patient limited by pain     [] Patient limited by other medical complications  [x] Other: Pt has decreased PROM due to joint stiffness and MD protocol. Discomfort noted in the upper arm and shoulder. Modified the program today. Reviewed precautions with the pt. PLAN: ROM program. Follow MD protocol. [x] Continue per plan of care [] Alter current plan (see comments above)  [] Plan of care initiated [] Hold pending MD visit [] Discharge      Electronically signed by:  Alicia Naranjo PT      Note: If patient does not return for scheduled/ recommended follow up visits, this note will serve as a discharge from care along with most recent update on progress.

## 2020-03-18 ENCOUNTER — HOSPITAL ENCOUNTER (OUTPATIENT)
Dept: PHYSICAL THERAPY | Age: 70
Setting detail: THERAPIES SERIES
Discharge: HOME | End: 2020-03-18
Payer: MEDICARE

## 2020-03-18 ENCOUNTER — OFFICE VISIT (OUTPATIENT)
Dept: ORTHOPEDIC SURGERY | Age: 70
End: 2020-03-18

## 2020-03-18 VITALS — BODY MASS INDEX: 34.01 KG/M2 | HEIGHT: 74 IN | WEIGHT: 264.99 LBS

## 2020-03-18 PROCEDURE — 99024 POSTOP FOLLOW-UP VISIT: CPT | Performed by: ORTHOPAEDIC SURGERY

## 2020-03-18 PROCEDURE — 97110 THERAPEUTIC EXERCISES: CPT | Performed by: PHYSICAL THERAPIST

## 2020-03-18 NOTE — PROGRESS NOTES
states sometimes his shoulder feels good, sometimes not so good because he forgets. OBJECTIVE:      3/10/20          ROM PROM AROM  Comment     L R L R     Flexion   120 table slide    125 AAROM     Abduction   90         ER    45 scaption         IR    25 scaption         Other  (cervical)             Other                 Not assessed d/t recent surgery and MD restrictions   Strength L R Comment   Flexion         Abduction         ER         IR         Supraspinatus         Upper Trap         Lower Trap         Mid Trap         Rhomboids         Biceps         Triceps         Horizontal Abduction         Horizontal Adduction         Lats            Not assessed d/t recent surgery and MD restrictions  Special Tests Results/Comment   Maria Esther     Neanthony     Speeds     OBriens     Apprehension      Load & Shift            Reflexes/Sensation:               [x]? Dermatomes/Myotomes intact Myotomes not assessed d/t recent surgery and MD restrictions              []? Reflexes equal and normal bilaterally Not assessed d/t recent surgery and MD restrictions              []? Other:     Joint mobility: Not assessed d/t recent surgery and MD restrictions              []? Normal               []? Hypo              []? Hyper     Palpation: Generalized TTP around shoulder     Functional Mobility/Transfers: Unable to use RUE d/t recent surgery     Posture: WNL     Bandages/Dressings/Incisions:   1/30/20 Post op dressings removed by MD on 1/28/20. Steri strips in place.        RESTRICTIONS/PRECAUTIONS: NO RC STRENGTH; Flex 140, ER 40    Exercises/Interventions:   Exercises:  Exercise/Equipment Resistance/Repetitions Other comments   Stretching/PROM     Wand     Table Slides 10x10\" each Flex/Abd   UE [place] ER 45 degrees  [id number]   Pulleys 10x10\" scaption    Pendulum     Elbow flex/ext     Wrist supination     IR stretch 10x10\" BB c strap, pulling toward R pocket        Isometrics     Retraction          Weight shift Flexion     Abduction     External Rotation     Internal Rotation     Biceps     Triceps          PRE's     Wrist flex     Wrist ext     Flexion 10x10\" supine AAROM Assist from LUE   Abduction     External Rotation     Internal Rotation     Shrugs 3x10 3#    EXT     Reverse Flys     Serratus     Horizontal Abd with ER     Biceps 3x10 3#    Triceps     Retraction          Cable Column/Theraband     External Rotation     Internal Rotation     Shrugs     Lats     Ext     Flex     Scapular Retraction 3x10 blue Shoulder blade squeeze only   BIC     TRIC Blue 3x10    PNF          Dynamic Stability          [place]          Manual interventions     Manual PROM            Plan for next session: progress per MD protocol    Patient Education:  Access Code: [email or web address]   URL: [employer]/   Date: 03/10/2020   Prepared by: Lauren Boas     Exercises   Standing Shoulder Row with Anchored Resistance   Seated Shoulder Flexion Towel Slide at Table Top -   Supine Shoulder External Rotation with Dowel -   Seated Shoulder Abduction Towel Slide at Table Top -   Standing Tricep Extensions with Resistance -    Standing Bicep Curls Supinated with Dumbbells -   Standing Single Arm Shoulder Shrug Circles AROM Forward -    Therapeutic Exercise and NMR EXR  [x] (13360) Provided verbal/tactile cueing for activities related to strengthening, flexibility, endurance, ROM  for improvements in scapular, scapulothoracic and UE control with self care, reaching, carrying, lifting, house/yardwork, driving/computer work.    [] (12331) Provided verbal/tactile cueing for activities related to improving balance, coordination, kinesthetic sense, posture, motor skill, proprioception  to assist with  scapular, scapulothoracic and UE control with self care, reaching, carrying, lifting, house/yardwork, driving/computer work.     Therapeutic Activities:    [] (94472 or ) Provided verbal/tactile cueing for activities related to improving balance, coordination, kinesthetic sense, posture, motor skill, proprioception and motor activation to allow for proper function of scapular, scapulothoracic and UE control with self care, carrying, lifting, driving/computer work. Home Exercise Program: Pt access code: [email or web address]   Initiated 1/30/20. Printed hand out given. Pt demonstrated proper form of each exercise and expressed verbal understanding of frequency and duration. [x] (22829) Reviewed/Progressed HEP activities related to strengthening, flexibility, endurance, ROM of scapular, scapulothoracic and UE control with self care, reaching, carrying, lifting, house/yardwork, driving/computer work  [] (91191) Reviewed/Progressed HEP activities related to improving balance, coordination, kinesthetic sense, posture, motor skill, proprioception of scapular, scapulothoracic and UE control with self care, reaching, carrying, lifting, house/yardwork, driving/computer work      Manual Treatments:    [x] (33543) Provided manual therapy to mobilize soft tissue/joints of cervical/CT, scapular GHJ and UE for the purpose of modulating pain, promoting relaxation,  increasing ROM, reducing/eliminating soft tissue swelling/inflammation/restriction, improving soft tissue extensibility and allowing for proper ROM for normal function with self care, reaching, carrying, lifting, house/yardwork, driving/computer work    Modalities:   Will ice at home    Charges:  Timed Code Treatment Minutes: 39   Total Treatment Minutes: 39   Time in: 1:02  Time out: 1:41    [] EVAL (LOW) 71455 (typically 20 minutes face-to-face)  [] EVAL (MOD) 62368 (typically 30 minutes face-to-face)  [] EVAL (HIGH) 47045 (typically 45 minutes face-to-face)  [] RE-EVAL     [x] VERONICA(80284) x  3   [] IONTO  [] NMR (28814) x     [] VASO  [] Manual (83860) x      [] Other:  [] TA x      [] Mech Traction (89998)  [] ES(attended) (98259)      [] ES (un) (23383):     GOALS:  Patient stated goal: no pain, better co-morbidities. [] Plan just implemented, too soon to assess goals progression <30days   [] Goals require adjustment due to lack of progress  [] Patient is not progressing as expected and requires additional follow up with physician  [] Other    Prognosis for POC: [x] Good [] Fair  [] Poor      Patient requires continued skilled intervention: [x] Yes  [] No    Treatment/Activity Tolerance:  [] Patient able to complete treatment  [] Patient limited by fatigue  [x] Patient limited by pain     [] Patient limited by other medical complications  [x] Other: Pt exhibits R shoulder flex/ABD tightness, good improvement in ER from LPV. Strength not assessed d/t surgical restrictions. Pt has decreased PROM due to joint stiffness and MD protocol. Pt is limited in ADLs and IADLs d/t MD protocol. Pt continues to have daily pain, but it is manageable. Pt tolerated addition of pulleys, AAROM flex and BB IR stretching well, noted discomfort at end range. Pt requires PT follow up to address ROM, strength and functional mobility deficits. Discussed COVID-19 with pt and necessary safety precautions to be taken within clinic, including social distancing. Discussed phone and e-mail communication as well as online use of [employer] for HEP changes and modifications should pt not feel comfortable coming into the clinic at this time or to be prepared should the clinic close for safety measures. Pt provided with updated HEP, [employer] login information and PT contact information. PLAN: ROM program. Follow MD protocol. [x] Continue per plan of care [] Alter current plan (see comments above)  [] Plan of care initiated [] Hold pending MD visit [] Discharge      Electronically signed by:  Francisco Santa, PT, DPT  Physical Therapist  [id number]  [email or web address]    Note: If patient does not return for scheduled/ recommended follow up visits, this note will serve as a discharge from care along with most recent update on

## 2020-03-18 NOTE — LETTER
[] Massage     [] Sports specific program:      [] Cryotherapy      [] Electrical stimulation     [] Paraffin  [] Whirlpool  [] TENS    [] Home exercise program (copy to patient). Perform exercises for:   15     minutes    3      times/day  [] Supervised physical therapy  Frequency: []  1x week  [x] 2x week  [] 3x week  [] Other:   Duration: [] 2 weeks   [] 4 weeks  [x] 6 weeks  [] Other:     Additional Instructions: Active assisted range of motion 140 elevation and 40 degrees external rotation, advance per protocol. No rotator cuff strengthening yet. Please work on deltoid retraining.         Juan Alberto Garsia [phone]     03/18/20  12:30 PM

## 2020-03-25 ENCOUNTER — HOSPITAL ENCOUNTER (OUTPATIENT)
Dept: PHYSICAL THERAPY | Age: 70
Setting detail: THERAPIES SERIES
End: 2020-03-25
Payer: MEDICARE

## 2020-04-07 ENCOUNTER — HOSPITAL ENCOUNTER (OUTPATIENT)
Dept: PHYSICAL THERAPY | Age: 70
Setting detail: THERAPIES SERIES
Discharge: HOME | End: 2020-04-07
Payer: MEDICARE

## 2020-04-07 PROCEDURE — 97112 NEUROMUSCULAR REEDUCATION: CPT | Performed by: PHYSICAL THERAPIST

## 2020-04-07 PROCEDURE — 97110 THERAPEUTIC EXERCISES: CPT | Performed by: PHYSICAL THERAPIST

## 2020-04-07 NOTE — PROGRESS NOTES
[facility]    Physical Therapy Daily Treatment Note  Date:  2020    Patient Name:  Ronda Davis    :  1950  MRN: [id number]  Restrictions/Precautions:    Medical/Treatment Diagnosis Information:  · Diagnosis: M25.511 (ICD-10-CM) - Acute pain of right shoulder  · S/p Arthroscopic revision of subacromial decompression, arthroscopic removal of retained sutures, arthroscopic subtotal synovectomy, arthroscopic repair of separate subscapularis and then supraspinatus/infraspinatus rotator cuff tendon tears with collagen patch augmentation for the latter   · DOS 20  · Treatment Diagnosis: M75.101, M25.511, R53.1 leading to impaired ADLs and IADLs  Insurance/Certification information:  PT Insurance Information: PT BENEFITS 2020 FACILITY/ MEDICARE PRIMARY/ PAYS 80%/ NO VISIT LIMIT MED NEC/ [employer] SECONDARY INS/ 20 PAG  Physician Information:  Referring Practitioner: Froy Dalal MD  Has the plan of care been signed (Y/N):        []  Yes  [x]  No     Date of Patient follow up with Physician:  20      Is this a Progress Report:     []  Yes  [x]  No        If Yes:  Date Range for reporting period:  Beginning 20  Ending 3/18/20    Progress report will be due (10 Rx or 30 days whichever is less): 99      Recertification will be due (POC Duration  / 90 days whichever is less): 20        Visit # Insurance Allowable Auth Required   6 MEDICARE []  Yes [x]  No        Functional Scale:    Date assessed:   UEFI =10% (90% deficit)   20  UEFI 80=26.25% (73.75% deficit)  3/18/20    Latex Allergy:  [x]NO      []YES  Preferred Language for Healthcare:   [x]English       []other:    Pain level:  3-410  Tylenol     SUBJECTIVE:  10 weeks po. Pt states that his shoulder has been sore from HEP, states it is general soreness.  Pt states that he did a little digging one day, knew he probably shouldn't have done that, had some soreness. OBJECTIVE:      3/10/20          ROM PROM AROM  Comment     L R L R     Flexion      135      Abduction      135     ER      65     IR      30     Other  (cervical)             Other                 Not assessed d/t recent surgery and MD restrictions   Strength L R Comment   Flexion         Abduction         ER         IR         Supraspinatus         Upper Trap         Lower Trap         Mid Trap         Rhomboids         Biceps         Triceps         Horizontal Abduction         Horizontal Adduction         Lats            Not assessed d/t recent surgery and MD restrictions  Special Tests Results/Comment   Maria Esther     [place]     Speeds     OBriens     Apprehension      Load & Shift            Reflexes/Sensation:               [x]? Dermatomes/Myotomes intact Myotomes not assessed d/t recent surgery and MD restrictions              []? Reflexes equal and normal bilaterally Not assessed d/t recent surgery and MD restrictions              []? Other:     Joint mobility: Not assessed d/t recent surgery and MD restrictions              []? Normal               []? Hypo              []? Hyper     Palpation: Generalized TTP around shoulder     Functional Mobility/Transfers: Unable to use RUE d/t recent surgery     Posture: WNL     Bandages/Dressings/Incisions:   1/30/20 Post op dressings removed by MD on 1/28/20. Steri strips in place.        RESTRICTIONS/PRECAUTIONS: NO RC STRENGTH; Flex 140, ER 40    Exercises/Interventions:   Exercises:  Exercise/Equipment Resistance/Repetitions Other comments   Stretching/PROM     Wand 5x30\" supine 90/90 ER    Table Slides UE [place]     Pulleys 10x10\" flex/ABD    Pendulum     Elbow flex/ext     Wrist supination     IR stretch 10x10\" BB c strap, pulling toward R pocket   Sleeper stretch 10x10\"         Isometrics     Retraction          Weight shift     Flexion     Abduction     External Rotation 10x10\" step out, green    Internal Rotation 10x10\" step out, green

## 2020-04-08 ENCOUNTER — OFFICE VISIT (OUTPATIENT)
Dept: ORTHOPEDIC SURGERY | Age: 70
End: 2020-04-08

## 2020-04-08 ENCOUNTER — APPOINTMENT (OUTPATIENT)
Dept: PHYSICAL THERAPY | Age: 70
End: 2020-04-08
Payer: MEDICARE

## 2020-04-08 VITALS
HEART RATE: 67 BPM | SYSTOLIC BLOOD PRESSURE: 126 MMHG | BODY MASS INDEX: 34.01 KG/M2 | TEMPERATURE: 99.3 F | HEIGHT: 74 IN | DIASTOLIC BLOOD PRESSURE: 86 MMHG | WEIGHT: 264.99 LBS

## 2020-04-08 PROCEDURE — 99024 POSTOP FOLLOW-UP VISIT: CPT | Performed by: ORTHOPAEDIC SURGERY

## 2020-04-08 NOTE — LETTER
[] Massage     [] Sports specific program:      [x] Cryotherapy      [] Electrical stimulation     [] Paraffin  [] Whirlpool  [] TENS    [x] Home exercise program (copy to patient). Perform exercises for:   15     minutes    3      times/day  [x] Supervised physical therapy  Frequency: []  1x week  [x] 2x week  [] 3x week  [] Other:   Duration: [] 2 weeks   [] 4 weeks  [x] 6 weeks  [] Other:     Additional Instructions:   AROM as tolerated  Please work on Progressive incline deltoid strengthening program  He may begin gentle rotator cuff strengthening at 13 weeks postop. This therapy is essential for his recovery    Praveena Amin MD, PhD

## 2020-04-15 ENCOUNTER — HOSPITAL ENCOUNTER (OUTPATIENT)
Dept: PHYSICAL THERAPY | Age: 70
Discharge: HOME | End: 2020-04-15
Payer: MEDICARE

## 2020-04-15 PROCEDURE — 97110 THERAPEUTIC EXERCISES: CPT | Performed by: PHYSICAL THERAPIST

## 2020-04-15 NOTE — PROGRESS NOTES
65     IR      30     Other  (cervical)             Other                 Not assessed d/t recent surgery and MD restrictions   Strength L R Comment   Flexion         Abduction         ER         IR         Supraspinatus         Upper Trap         Lower Trap         Mid Trap         Rhomboids         Biceps         Triceps         Horizontal Abduction         Horizontal Adduction         Lats            Not assessed d/t recent surgery and MD restrictions  Special Tests Results/Comment   aMria Esther     [place]     Speeds     OBriens     Apprehension      Load & Shift            Reflexes/Sensation:               [x]? Dermatomes/Myotomes intact Myotomes not assessed d/t recent surgery and MD restrictions              []? Reflexes equal and normal bilaterally Not assessed d/t recent surgery and MD restrictions              []? Other:     Joint mobility: Not assessed d/t recent surgery and MD restrictions              []? Normal               []? Hypo              []? Hyper     Palpation: Generalized TTP around shoulder     Functional Mobility/Transfers: Unable to use RUE d/t recent surgery     Posture: WNL     Bandages/Dressings/Incisions:   1/30/20 Post op dressings removed by MD on 1/28/20. Steri strips in place.        RESTRICTIONS/PRECAUTIONS: NO RC STRENGTH; Flex 140, ER 40    Exercises/Interventions:   Exercises:  Exercise/Equipment Resistance/Repetitions Other comments   Stretching/PROM     Wand 5x30\" supine 90/90 ER    Table Slides UE [place]     Pulleys 10x10\" flex/ABD    Pendulum     Elbow flex/ext     Wrist supination     IR stretch 10x10\" BB c strap, pulling toward R pocket   Sleeper stretch 10x10\"         Isometrics     Retraction          Weight shift     Flexion     Abduction     External Rotation  Hold per MD until week 13   Internal Rotation 10x10\" step out, green    Biceps     Triceps          PRE's     Wrist flex     Wrist ext     Flexion  Assist from LUE   Abduction     External Rotation 3x10 0# related to improving balance, coordination, kinesthetic sense, posture, motor skill, proprioception of scapular, scapulothoracic and UE control with self care, reaching, carrying, lifting, house/yardwork, driving/computer work      Manual Treatments:    [] (33007) Provided manual therapy to mobilize soft tissue/joints of cervical/CT, scapular GHJ and UE for the purpose of modulating pain, promoting relaxation,  increasing ROM, reducing/eliminating soft tissue swelling/inflammation/restriction, improving soft tissue extensibility and allowing for proper ROM for normal function with self care, reaching, carrying, lifting, house/yardwork, driving/computer work    Modalities:      Charges:  Timed Code Treatment Minutes: 20   Total Treatment Minutes: 20       [] EVAL (LOW) 34892 (typically 20 minutes face-to-face)  [] EVAL (MOD) 27346 (typically 30 minutes face-to-face)  [] EVAL (HIGH) 78365 (typically 45 minutes face-to-face)  [] RE-EVAL     [x] FE(49505) x  1  [] IONTO  [] NMR (49991) x 1    [] VASO  [] Manual (09984) x      [] Other:  [] TA x      [] Mech Traction (05440)  [] ES(attended) (30794)      [] ES (un) (67902):     GOALS:  Patient stated goal: no pain, better movement    []? Progressing: []? Met: []? Not Met: []? Adjusted     Therapist goals for Patient:   Short Term Goals: To be achieved in: 4-6 weeks 2/27/20-3/12/20  1. Independent in HEP and progression per patient tolerance, in order to prevent re-injury. []? Progressing: [x]? Met: []? Not Met: []? Adjusted    2. Patient will have a decrease in pain to facilitate improvement in movement, function, and ADLs as indicated by Functional Deficits. []? Progressing: [x]? Met: []? Not Met: []? Adjusted    3. Patient will tolerate progression from recliner to bed for sleeping.  []? Progressing: []? Met: []? Not Met: []? Adjusted      4. Patient will tolerate progression out of sling.  []? Progressing: []? Met: []? Not Met: []?  Adjusted      Long Term Goals: To be achieved in: 6 months 7/16/20  1. Disability index score of 10% or less for the UEFS to assist with reaching prior level of function. []? Progressing: []? Met: []? Not Met: []? Adjusted  2. Patient will demonstrate increased AROM to WNL to allow for proper joint functioning as indicated by patients Functional Deficits. []? Progressing: []? Met: []? Not Met: []? Adjusted  3. Patient will demonstrate an increase in RUE strength to good scapular and core control  for UE to allow for proper functional mobility as indicated by patients Functional Deficits. []? Progressing: []? Met: []? Not Met: []? Adjusted  4. Patient will return to ADLs, IADLs and functional activities without increased symptoms or restriction. []? Progressing: []? Met: []? Not Met: []? Adjusted  5. Patient will tolerate progressive return to light weight lifting and swimming. []? Progressing: []? Met: []? Not Met: []? Adjusted    Overall Progression Towards Functional goals/ Treatment Progress Update:  [x] Patient is progressing as expected towards functional goals listed. [] Progression is slowed due to complexities/Impairments listed. [] Progression has been slowed due to co-morbidities. [] Plan just implemented, too soon to assess goals progression <30days   [] Goals require adjustment due to lack of progress  [] Patient is not progressing as expected and requires additional follow up with physician  [] Other    Prognosis for POC: [x] Good [] Fair  [] Poor      Patient requires continued skilled intervention: [x] Yes  [] No    Treatment/Activity Tolerance:  [] Patient able to complete treatment  [] Patient limited by fatigue  [x] Patient limited by pain     [] Patient limited by other medical complications  [x] Other: Pt is doing well with the current program. He has discomfort with the rotation stretching exercises.  We reviewed the current program. Pt may increase the weights for shrugs and bicep curl by 1# if the exercises are painfree and he has good form. Pt has difficulty with service in his area of town. He would prefer a phone call next time. I will plan to call him in 2 weeks with the next progression of exercises. I will send them through [address] [place] [address] and we will go over them together on the phone. PLAN: ROM program. Follow MD protocol. [x] Continue per plan of care [] Alter current plan (see comments above)  [] Plan of care initiated [] Hold pending MD visit [] Discharge      Electronically signed by:  Zoie Barnhart, PT    Note: If patient does not return for scheduled/ recommended follow up visits, this note will serve as a discharge from care along with most recent update on progress. normal (ped)...

## 2020-05-20 ENCOUNTER — OFFICE VISIT (OUTPATIENT)
Dept: ORTHOPEDIC SURGERY | Age: 70
End: 2020-05-20
Payer: MEDICARE

## 2020-05-20 ENCOUNTER — HOSPITAL ENCOUNTER (OUTPATIENT)
Dept: PHYSICAL THERAPY | Age: 70
Setting detail: THERAPIES SERIES
Discharge: HOME | End: 2020-05-20
Payer: MEDICARE

## 2020-05-20 VITALS — BODY MASS INDEX: 34.01 KG/M2 | WEIGHT: 264.99 LBS | HEIGHT: 74 IN | TEMPERATURE: 98 F

## 2020-05-20 PROCEDURE — 97530 THERAPEUTIC ACTIVITIES: CPT | Performed by: PHYSICAL THERAPIST

## 2020-05-20 PROCEDURE — G8417 CALC BMI ABV UP PARAM F/U: HCPCS | Performed by: ORTHOPAEDIC SURGERY

## 2020-05-20 PROCEDURE — 97110 THERAPEUTIC EXERCISES: CPT | Performed by: PHYSICAL THERAPIST

## 2020-05-20 PROCEDURE — 4040F PNEUMOC VAC/ADMIN/RCVD: CPT | Performed by: ORTHOPAEDIC SURGERY

## 2020-05-20 PROCEDURE — G8427 DOCREV CUR MEDS BY ELIG CLIN: HCPCS | Performed by: ORTHOPAEDIC SURGERY

## 2020-05-20 PROCEDURE — 1036F TOBACCO NON-USER: CPT | Performed by: ORTHOPAEDIC SURGERY

## 2020-05-20 PROCEDURE — 99213 OFFICE O/P EST LOW 20 MIN: CPT | Performed by: ORTHOPAEDIC SURGERY

## 2020-05-20 PROCEDURE — 97112 NEUROMUSCULAR REEDUCATION: CPT | Performed by: PHYSICAL THERAPIST

## 2020-05-20 PROCEDURE — 1123F ACP DISCUSS/DSCN MKR DOCD: CPT | Performed by: ORTHOPAEDIC SURGERY

## 2020-05-20 PROCEDURE — 3017F COLORECTAL CA SCREEN DOC REV: CPT | Performed by: ORTHOPAEDIC SURGERY

## 2020-05-20 NOTE — LETTER
[] Moist heat     [] Sports specific program:   [] Massage         [x] Cryotherapy      [] Electrical stimulation     [] Paraffin  [] Whirlpool  [] TENS    [x] Home exercise program (copy to patient). Perform exercises for:   15     minutes    3      times/day  [x] Supervised physical therapy  Frequency: [x]  1x week  [] 2x week  [] 3x week  [] Other:   Duration: [] 2 weeks   [] 4 weeks  [x] 6 weeks  [] Other:     Additional Instructions:    Focus on improving IR, progressive RTC strengthening

## 2020-05-20 NOTE — PLAN OF CARE
[facility] 55   Physical Therapy Re-Certification Plan of Care    Dear  Dr Yelitza Hernandez,    We had the pleasure of treating the following patient for physical therapy services at [address] [place] [address]. A summary of our findings can be found in the updated assessment below. This includes our plan of care. If you have any questions or concerns regarding these findings, please do not hesitate to contact me at [phone].   Thank you for the referral.     Physician Signature:________________________________Date:__________________  By signing above (or electronic signature), therapists plan is approved by physician      Overall Response to Treatment:   [x]Patient is responding well to treatment and improvement is noted with regards  to goals   []Patient should continue to improve in reasonable time if they continue HEP   []Patient has plateaued and is no longer responding to skilled PT intervention    []Patient is getting worse and would benefit from return to referring MD   []Patient unable to adhere to initial POC   []Other:     Date range of Visits: 20 to 20  Total Visits: 8    Physical Therapy Daily Treatment Note  Date:  2020    Patient Name:  Marcianne Simmonds    :  1950  MRN: [id number]  Restrictions/Precautions:    Medical/Treatment Diagnosis Information:  · Diagnosis: M25.511 (ICD-10-CM) - Acute pain of right shoulder  · S/p Arthroscopic revision of subacromial decompression, arthroscopic removal of retained sutures, arthroscopic subtotal synovectomy, arthroscopic repair of separate subscapularis and then supraspinatus/infraspinatus rotator cuff tendon tears with collagen patch augmentation for the latter   · DOS 20  · Treatment Diagnosis: M75.101, M25.511, R53.1 leading to impaired ADLs and IADLs  Insurance/Certification information:  PT Insurance Information: PT BENEFITS 2020 FACILITY/ MEDICARE PRIMARY/ PAYS 80%/ assessed d/t recent surgery and MD restrictions              []? Normal               []? Hypo              []? Hyper     Palpation: Generalized TTP around shoulder     Functional Mobility/Transfers: Unable to use RUE d/t recent surgery     Posture: WNL     Bandages/Dressings/Incisions:   1/30/20 Post op dressings removed by MD on 1/28/20. Steri strips in place. RESTRICTIONS/PRECAUTIONS: NO RC STRENGTH; Flex 140, ER 40    Exercises/Interventions:   Exercises:  Exercise/Equipment Resistance/Repetitions Other comments   Stretching/PROM     Wand    Table Slides UE [place]    Pulleys    Pendulum     Elbow flex/ext     Wrist supination     IR stretch 10x10\" or [id number]    Sleeper stretch 10x10\" or [id number]         Isometrics     Retraction          Weight shift     Flexion     Abduction     External Rotation     Internal Rotation     Biceps     Triceps          PRE's     Wrist flex     Wrist ext     Flexion 2# 3x10 supine  0-90 degrees   Abduction     External Rotation 5#  3x10     Internal Rotation     Shrugs     EXT 2# 3x10    Reverse Flys     Serratus 5# 3x10    Horizontal Abd with ER 0# 3x10    Biceps 2x10 5#; 1x10 10#    Triceps     Retraction          Cable Column/Theraband     External Rotation     Internal Rotation Blue 3x10    Shrugs     Lats     Ext     Flex     Scapular Retraction 3x10 black/silver    BIC     TRIC     PNF          Dynamic Stability     Ball on wall 3x10 CW/CCW    Plyoback          Manual interventions     Manual PROM             Patient Education:  Access Code: [email or web address]   URL: [email or web address]   Date: 05/20/2020   Prepared by: Jacob Tolbert     Exercises   Seated Shoulder Flexion AAROM with Pulley Behind - 5 reps - 1 sets - 30 second hold - 1x daily - 7x weekly   Seated Shoulder Abduction AAROM with Pulley Behind - 5 reps - 1 sets - 30 second hold - 1x daily - 7x weekly   Supine Shoulder External Rotation in 45 Degrees Abduction AAROM with Dowel - 5 reps - 1 sets - 30 second hold - 1x daily - 7x weekly   Sleeper Stretch - 5 reps - 1 sets - 30 second hold - 1x daily - 7x weekly   Standing Shoulder Internal Rotation Stretch with Towel - 5 reps - 1 sets - 30 second hold - 1x daily - 7x weekly   Shoulder Internal Rotation with Resistance - 10 reps - 3 sets - 1x daily - 7x weekly   Sidelying Shoulder External Rotation with Dumbbell - 10 reps - 3 sets - 1x daily - 7x weekly   Standing Row with Anchored Resistance - 10 reps - 3 sets - 1x daily - 7x weekly   Supine Scapular Protraction in Flexion with Dumbbells - 10 reps - 3 sets - 1x daily - 7x weekly   Standing Bicep Curls Supinated with Dumbbells - 10 reps - 3 sets - 1x daily - 7x weekly   Prone Shoulder Extension - Single Arm - 10 reps - 3 sets - 1x daily - 7x weekly   Prone Shoulder Horizontal Abduction - 10 reps - 3 sets - 1x daily - 7x weekly   Standing Wall Ball Circles with [address] - 10 reps - 3 sets - 1x daily - 7x weekly   Supine Shoulder Flexion  - 10 reps - 3 sets - 1x daily - 7x weekly       Therapeutic Exercise and NMR EXR  [x] (33215) Provided verbal/tactile cueing for activities related to strengthening, flexibility, endurance, ROM  for improvements in scapular, scapulothoracic and UE control with self care, reaching, carrying, lifting, house/yardwork, driving/computer work. [x] (31327) Provided verbal/tactile cueing for activities related to improving balance, coordination, kinesthetic sense, posture, motor skill, proprioception  to assist with  scapular, scapulothoracic and UE control with self care, reaching, carrying, lifting, house/yardwork, driving/computer work. Therapeutic Activities:    [x] (32382 or 57723) Provided verbal/tactile cueing for activities related to improving balance, coordination, kinesthetic sense, posture, motor skill, proprioception and motor activation to allow for proper function of scapular, scapulothoracic and UE control with self care, carrying, lifting, driving/computer work.

## 2020-05-27 ENCOUNTER — HOSPITAL ENCOUNTER (OUTPATIENT)
Dept: PHYSICAL THERAPY | Age: 70
Setting detail: THERAPIES SERIES
Discharge: HOME | End: 2020-05-27
Payer: MEDICARE

## 2020-05-27 PROCEDURE — 97530 THERAPEUTIC ACTIVITIES: CPT | Performed by: PHYSICAL THERAPIST

## 2020-05-27 PROCEDURE — 97112 NEUROMUSCULAR REEDUCATION: CPT | Performed by: PHYSICAL THERAPIST

## 2020-05-27 PROCEDURE — 97110 THERAPEUTIC EXERCISES: CPT | Performed by: PHYSICAL THERAPIST

## 2020-05-27 NOTE — FLOWSHEET NOTE
[facility]    Physical Therapy Daily Treatment Note  Date:  2020    Patient Name:  Jesse Corrales    :  1950  MRN: [id number]  Restrictions/Precautions:    Medical/Treatment Diagnosis Information:  · Diagnosis: M25.511 (ICD-10-CM) - Acute pain of right shoulder  · S/p Arthroscopic revision of subacromial decompression, arthroscopic removal of retained sutures, arthroscopic subtotal synovectomy, arthroscopic repair of separate subscapularis and then supraspinatus/infraspinatus rotator cuff tendon tears with collagen patch augmentation for the latter   · DOS 20  · Treatment Diagnosis: M75.101, M25.511, R53.1 leading to impaired ADLs and IADLs  Insurance/Certification information:  PT Insurance Information: PT BENEFITS 2020 FACILITY/ MEDICARE PRIMARY/ PAYS 80%/ NO VISIT LIMIT MED NEC/ [employer] SECONDARY INS/ 20 PAG  Physician Information:  Referring Practitioner: Elizabeth Villasenor MD  Has the plan of care been signed (Y/N):        []  Yes  [x]  No     Date of Patient follow up with Physician:  20      Is this a Progress Report:     []  Yes  [x]  No        If Yes:  Date Range for reporting period:  Beginning   Ending     Progress report will be due (10 Rx or 30 days whichever is less):       Recertification will be due (POC Duration  / 90 days whichever is less): 20        Visit # Insurance Allowable Auth Required   9 MEDICARE []  Yes [x]  No        Functional Scale:    Date assessed:   UEFI 8/80=10% (90% deficit)   20  UEFI 21/80=26.25% (73.75% deficit)  3/18/20  Next visit    Latex Allergy:  [x]NO      []YES  Preferred Language for Healthcare:   [x]English       []other:    Pain level:      SUBJECTIVE:  Pt is doing well with the current program.     OBJECTIVE:  Right shoulder               ROM PROM AROM  Comment     L R L R     Flexion   170    WFL     Abduction       WFL     ER    85   90/90    WFL     IR    50 scaption    right buttock     Other  (cervical)             Other                 Not assessed d/t recent surgery and MD restrictions   Strength L R Comment   Flexion         Abduction         ER         IR         Supraspinatus         Upper Trap         Lower Trap         Mid Trap         Rhomboids         Biceps         Triceps         Horizontal Abduction         Horizontal Adduction         Lats            Not assessed d/t recent surgery and MD restrictions  Special Tests Results/Comment   Maria Esther Escobar     [place]     Apprehension      Load & Shift            Reflexes/Sensation:               [x]? Dermatomes/Myotomes intact Myotomes not assessed d/t recent surgery and MD restrictions              []? Reflexes equal and normal bilaterally Not assessed d/t recent surgery and MD restrictions              []? Other:     Joint mobility: Not assessed d/t recent surgery and MD restrictions              []? Normal               []? Hypo              []? Hyper     Palpation: Generalized TTP around shoulder     Functional Mobility/Transfers: Unable to use RUE d/t recent surgery     Posture: WNL     Bandages/Dressings/Incisions:   1/30/20 Post op dressings removed by MD on 1/28/20. Steri strips in place.        RESTRICTIONS/PRECAUTIONS: NO RC STRENGTH; Flex 140, ER 40    Exercises/Interventions: Right shoulder  Exercises:  Exercise/Equipment Resistance/Repetitions Other comments   Stretching/PROM     Wand    Table Slides UE [place]    Pulleys    Pendulum     Elbow flex/ext     Wrist supination     IR stretch [id number]    Sleeper stretch [id number]         Isometrics     Retraction          Weight shift     Flexion     Abduction     External Rotation     Internal Rotation     Biceps     Triceps          PRE's     Wrist flex     Wrist ext     Flexion 3# 3x10 supine  0-90 degrees   Abduction     External Rotation 5#  3x10     Internal Rotation 5# 3x10    Shrugs     EXT 3# 3x10    Reverse Flys     Serratus 6# 3x10 Total Treatment Minutes: 60       [] EVAL (LOW) 95155 (typically 20 minutes face-to-face)  [] EVAL (MOD) 40476 (typically 30 minutes face-to-face)  [] EVAL (HIGH) 95768 (typically 45 minutes face-to-face)  [] RE-EVAL     [x] TV(79425) x  2  [] IONTO  [x] NMR (45721) x 1    [] VASO  [] Manual (45963) x      [] Other:  [x] TA x  1    [] Mech Traction (51214)  [] ES(attended) (24109)      [] ES (un) (50621):     GOALS:  Patient stated goal: no pain, better movement    [x]? Progressing: []? Met: []? Not Met: []? Adjusted     Therapist goals for Patient:   Short Term Goals: To be achieved in: 4-6 weeks 2/27/20-3/12/20  1. Independent in HEP and progression per patient tolerance, in order to prevent re-injury. []? Progressing: [x]? Met: []? Not Met: []? Adjusted    2. Patient will have a decrease in pain to facilitate improvement in movement, function, and ADLs as indicated by Functional Deficits. []? Progressing: [x]? Met: []? Not Met: []? Adjusted    3. Patient will tolerate progression from recliner to bed for sleeping.  []? Progressing: [x]? Met: []? Not Met: []? Adjusted      4. Patient will tolerate progression out of sling.  []? Progressing: [x]? Met: []? Not Met: []? Adjusted      Long Term Goals: To be achieved in: 6 months 7/16/20  1. Disability index score of 10% or less for the UEFS to assist with reaching prior level of function. []? Progressing: []? Met: []? Not Met: []? Adjusted  2. Patient will demonstrate increased AROM to WNL to allow for proper joint functioning as indicated by patients Functional Deficits. []? Progressing: []? Met: []? Not Met: []? Adjusted  3. Patient will demonstrate an increase in RUE strength to good scapular and core control  for UE to allow for proper functional mobility as indicated by patients Functional Deficits. []? Progressing: []? Met: []? Not Met: []? Adjusted  4.  Patient will return to ADLs, IADLs and functional activities without increased symptoms or restriction. []? Progressing: []? Met: []? Not Met: []? Adjusted  5. Patient will tolerate progressive return to light weight lifting and swimming. []? Progressing: []? Met: []? Not Met: []? Adjusted    Overall Progression Towards Functional goals/ Treatment Progress Update:  [x] Patient is progressing as expected towards functional goals listed. [] Progression is slowed due to complexities/Impairments listed. [] Progression has been slowed due to co-morbidities. [] Plan just implemented, too soon to assess goals progression <30days   [] Goals require adjustment due to lack of progress  [] Patient is not progressing as expected and requires additional follow up with physician  [] Other    Prognosis for POC: [x] Good [] Fair  [] Poor      Patient requires continued skilled intervention: [x] Yes  [] No    Treatment/Activity Tolerance:  [x] Patient able to complete treatment  [] Patient limited by fatigue  [] Patient limited by pain     [] Patient limited by other medical complications  [x] Other: Pt has difficulty with IR ROM. He is progressing well with the strengthening program. Occasional VCs and TCs required for proper exercise technique. PLAN: ROM and strengthening program.   [x] Continue per plan of care [] Alter current plan (see comments above)  [] Plan of care initiated [] Hold pending MD visit [] Discharge      Electronically signed by:  Maria Isabel Canales PT    Note: If patient does not return for scheduled/ recommended follow up visits, this note will serve as a discharge from care along with most recent update on progress.

## 2020-06-10 ENCOUNTER — HOSPITAL ENCOUNTER (OUTPATIENT)
Dept: PHYSICAL THERAPY | Age: 70
Setting detail: THERAPIES SERIES
Discharge: HOME | End: 2020-06-10
Payer: MEDICARE

## 2020-06-10 PROCEDURE — 97112 NEUROMUSCULAR REEDUCATION: CPT | Performed by: PHYSICAL THERAPIST

## 2020-06-10 PROCEDURE — 97110 THERAPEUTIC EXERCISES: CPT | Performed by: PHYSICAL THERAPIST

## 2020-06-10 PROCEDURE — 97530 THERAPEUTIC ACTIVITIES: CPT | Performed by: PHYSICAL THERAPIST

## 2020-06-10 NOTE — FLOWSHEET NOTE
[facility]    Physical Therapy Daily Treatment Note  Date:  6/10/2020    Patient Name:  Christopher Morrow    :  1950  MRN: [id number]  Restrictions/Precautions:    Medical/Treatment Diagnosis Information:  · Diagnosis: M25.511 (ICD-10-CM) - Acute pain of right shoulder  · S/p Arthroscopic revision of subacromial decompression, arthroscopic removal of retained sutures, arthroscopic subtotal synovectomy, arthroscopic repair of separate subscapularis and then supraspinatus/infraspinatus rotator cuff tendon tears with collagen patch augmentation for the latter   · DOS 20  · Treatment Diagnosis: M75.101, M25.511, R53.1 leading to impaired ADLs and IADLs  Insurance/Certification information:  PT Insurance Information: PT BENEFITS  FACILITY/ MEDICARE PRIMARY/ PAYS 80%/ NO VISIT LIMIT MED NEC/ [employer] SECONDARY INS/ 20 PAG  Physician Information:  Referring Practitioner: Jonas Hernandes MD  Has the plan of care been signed (Y/N):        []  Yes  [x]  No     Date of Patient follow up with Physician:  20      Is this a Progress Report:     []  Yes  [x]  No        If Yes:  Date Range for reporting period:  Beginning   Ending     Progress report will be due (10 Rx or 30 days whichever is less):       Recertification will be due (POC Duration  / 90 days whichever is less): 20        Visit # Insurance Allowable Auth Required   10 MEDICARE []  Yes [x]  No        Functional Scale:    Date assessed:   UEFI 880=10% (90% deficit)   20  UEFI 21/80=26.25% (73.75% deficit)  3/18/20  UEFI 68/80=85% (15% deficit)  6/10/20    Latex Allergy:  [x]NO      []YES  Preferred Language for Healthcare:   [x]English       []other:    Pain level:      SUBJECTIVE:  Pt is 4.5 months po. Pt states that his shoulder has been feeling pretty good. Pt notes that his shoulder gets tired at times. Pt gets soreness from working out.  If he lays on L side to sleep R Row 3x10 10#         Cable Column/Theraband     External Rotation     Internal Rotation     Shrugs     Lats     Ext     Flex     Scapular Retraction     BIC     TRIC     PNF          Dynamic Stability     Ball on wall 3x10 CW/CCW    Body blade 3x30\" side to side Small blade, arm at side, neutral ER/IR        [place]          Manual interventions     Manual PROM             Patient Education:  Access Code: [email or web address]   URL: [email or web address]   Date: 05/27/2020   Prepared by: Alvina Steele     Exercises   Seated Shoulder Flexion AAROM with Pulley Behind - 5 reps - 1 sets - 30 second hold - 1x daily - 7x weekly   Supine Shoulder External Rotation in 45 Degrees Abduction AAROM with Dowel - 5 reps - 1 sets - 30 second hold - 1x daily - 7x weekly   Sleeper Stretch - 5 reps - 1 sets - 30 second hold - 1x daily - 7x weekly   Standing Shoulder Internal Rotation Stretch with Towel - 5 reps - 1 sets - 30 second hold - 1x daily - 7x weekly   Sidelying Shoulder External Rotation with Dumbbell - 10 reps - 3 sets - 1x daily - 7x weekly   Supine Scapular Protraction in Flexion with Dumbbells - 10 reps - 3 sets - 1x daily - 7x weekly   Standing Bicep Curls Supinated with Dumbbells - 10 reps - 3 sets - 1x daily - 7x weekly   Sidelying Shoulder Internal Rotation with Dumbbell - 10 reps - 3 sets - 1x daily - 7x weekly   Prone Shoulder Extension - Single Arm - 10 reps - 3 sets - 1x daily - 7x weekly   Standing Bent Over Single Arm Shoulder Row - 10 reps - 3 sets - 1x daily - 7x weekly   Prone Shoulder Horizontal Abduction - 10 reps - 3 sets - 1x daily - 7x weekly   Standing Wall Ball Circles with [address] - 10 reps - 3 sets - 1x daily - 7x weekly       Therapeutic Exercise and NMR EXR  [x] (85668) Provided verbal/tactile cueing for activities related to strengthening, flexibility, endurance, ROM  for improvements in scapular, scapulothoracic and UE control with self care, reaching, carrying, lifting, house/yardwork, Minutes: 53   Time in: 9:00  Time out: 9:53    [] EVAL (LOW) 62846 (typically 20 minutes face-to-face)  [] EVAL (MOD) 91014 (typically 30 minutes face-to-face)  [] EVAL (HIGH) 07359 (typically 45 minutes face-to-face)  [] RE-EVAL     [x] MD(54744) x  2  [] IONTO  [x] NMR (93714) x 1    [] VASO  [] Manual (26622) x      [] Other:  [x] TA x  1    [] Mech Traction (78444)  [] ES(attended) (32407)      [] ES (un) (13891):     GOALS:  Patient stated goal: no pain, better movement    [x]? Progressing: []? Met: []? Not Met: []? Adjusted     Therapist goals for Patient:   Short Term Goals: To be achieved in: 4-6 weeks 2/27/20-3/12/20  1. Independent in HEP and progression per patient tolerance, in order to prevent re-injury. []? Progressing: [x]? Met: []? Not Met: []? Adjusted    2. Patient will have a decrease in pain to facilitate improvement in movement, function, and ADLs as indicated by Functional Deficits. []? Progressing: [x]? Met: []? Not Met: []? Adjusted    3. Patient will tolerate progression from recliner to bed for sleeping.  []? Progressing: [x]? Met: []? Not Met: []? Adjusted      4. Patient will tolerate progression out of sling.  []? Progressing: [x]? Met: []? Not Met: []? Adjusted      Long Term Goals: To be achieved in: 6 months 7/16/20  1. Disability index score of 10% or less for the UEFS to assist with reaching prior level of function. []? Progressing: []? Met: []? Not Met: []? Adjusted  2. Patient will demonstrate increased AROM to WNL to allow for proper joint functioning as indicated by patients Functional Deficits. []? Progressing: []? Met: []? Not Met: []? Adjusted  3. Patient will demonstrate an increase in RUE strength to good scapular and core control  for UE to allow for proper functional mobility as indicated by patients Functional Deficits. []? Progressing: []? Met: []? Not Met: []? Adjusted  4.  Patient will return to ADLs, IADLs and functional activities without increased

## 2020-06-17 ENCOUNTER — HOSPITAL ENCOUNTER (OUTPATIENT)
Dept: PHYSICAL THERAPY | Age: 70
Setting detail: THERAPIES SERIES
Discharge: HOME | End: 2020-06-17
Payer: MEDICARE

## 2020-06-17 PROCEDURE — 97110 THERAPEUTIC EXERCISES: CPT | Performed by: PHYSICAL THERAPIST

## 2020-06-17 PROCEDURE — 97140 MANUAL THERAPY 1/> REGIONS: CPT | Performed by: PHYSICAL THERAPIST

## 2020-06-17 NOTE — FLOWSHEET NOTE
PT. Pt states he did lift some boxes over the weekend as his daughter and son in law just moved back from [place]. OBJECTIVE:  Right shoulder             ROM PROM AROM  Comment     L R L R     Flexion   170    163     Abduction      180     ER    85   90/90    95     IR    43 90/90   35   right buttock          Strength L R Comment   Flexion         Abduction         ER         IR         Supraspinatus         Upper Trap         Lower Trap         Mid Trap         Biceps         Triceps            Not indicated at this time d/t surgery  Special Tests Results/Comment   Maria Esther Escobar     OBriens     Apprehension      Load & Shift            Reflexes/Sensation:               [x]? Dermatomes/Myotomes intact               []? Reflexes equal and normal bilaterally Not assessed              []? Other:     Joint mobility:               []? Normal               []? Hypo              []? Hyper     Palpation: Generalized TTP around shoulder     Functional Mobility/Transfers: Independent     Posture: WNL     Bandages/Dressings/Incisions:   1/30/20 Post op dressings removed by MD on 1/28/20. Steri strips in place.   6/10/20 Well healed       RESTRICTIONS/PRECAUTIONS:     Exercises/Interventions: Right shoulder  Exercises:  Exercise/Equipment Resistance/Repetitions Other comments   Stretching/PROM     Wand    Table Slides UE [place]    Pulleys    Pendulum     Elbow flex/ext     Door way stretch 3x30\"    IR stretch [id number]    Sleeper stretch [id number]         Isometrics     Retraction          Weight shift     Flexion     Abduction     External Rotation     Internal Rotation     Biceps     Triceps          PRE's     Wrist flex     Wrist ext     Flexion 3x10 2# Table at 30 deg incline   Abduction 3x10 2# Standing   External Rotation 3x12 5#    Internal Rotation 3x10 10#    Shrugs     EXT 3x10 10#    Chest press + serratus press 3x10 10#    Serratus     Horizontal Abd with ER 3x10 4#    Biceps     Triceps     Bent over Row 3x15 10#         Cable Column/Theraband     External Rotation 3x10 blue    Internal Rotation     Shrugs     Lats     Ext     Flex     Scapular Retraction     BIC     TRIC     PNF          Dynamic Stability     Ball on wall     Body blade 3x30\" side to side Small blade, arm at side, neutral ER/IR        Plyoback          Manual interventions     Manual PROM IR 4'    STM Lats/teres major insertion, 4'       Patient Education:  Access Code: [email or web address]   URL: [email or web address]   Date: 05/27/2020   Prepared by: Stephanie Arcos     Exercises   Seated Shoulder Flexion AAROM with Pulley Behind - 5 reps - 1 sets - 30 second hold - 1x daily - 7x weekly   Supine Shoulder External Rotation in 45 Degrees Abduction AAROM with [place]el - 5 reps - 1 sets - 30 second hold - 1x daily - 7x weekly   Sleeper Stretch - 5 reps - 1 sets - 30 second hold - 1x daily - 7x weekly   Standing Shoulder Internal Rotation Stretch with Towel - 5 reps - 1 sets - 30 second hold - 1x daily - 7x weekly   Sidelying Shoulder External Rotation with Dumbbell - 10 reps - 3 sets - 1x daily - 7x weekly   Supine Scapular Protraction in Flexion with Dumbbells - 10 reps - 3 sets - 1x daily - 7x weekly   Standing Bicep Curls Supinated with Dumbbells - 10 reps - 3 sets - 1x daily - 7x weekly   Sidelying Shoulder Internal Rotation with Dumbbell - 10 reps - 3 sets - 1x daily - 7x weekly   Prone Shoulder Extension - Single Arm - 10 reps - 3 sets - 1x daily - 7x weekly   Standing Bent Over Single Arm Shoulder Row - 10 reps - 3 sets - 1x daily - 7x weekly   Prone Shoulder Horizontal Abduction - 10 reps - 3 sets - 1x daily - 7x weekly   Standing Wall Ball Circles with [address] - 10 reps - 3 sets - 1x daily - 7x weekly       Therapeutic Exercise and NMR EXR  [x] (48539) Provided verbal/tactile cueing for activities related to strengthening, flexibility, endurance, ROM  for improvements in scapular, scapulothoracic and UE control with self care, reaching, carrying, lifting, house/yardwork, driving/computer work. [x] (95673) Provided verbal/tactile cueing for activities related to improving balance, coordination, kinesthetic sense, posture, motor skill, proprioception  to assist with  scapular, scapulothoracic and UE control with self care, reaching, carrying, lifting, house/yardwork, driving/computer work. Therapeutic Activities:    [x] (50283 or 57784) Provided verbal/tactile cueing for activities related to improving balance, coordination, kinesthetic sense, posture, motor skill, proprioception and motor activation to allow for proper function of scapular, scapulothoracic and UE control with self care, carrying, lifting, driving/computer work. Home Exercise Program: Pt access code: Manny Meyers  Initiated 1/30/20. Printed hand out given. Pt demonstrated proper form of each exercise and expressed verbal understanding of frequency and duration. Updated 4/7/20. Printed handout provided.   [x] (80490) Reviewed/Progressed HEP activities related to strengthening, flexibility, endurance, ROM of scapular, scapulothoracic and UE control with self care, reaching, carrying, lifting, house/yardwork, driving/computer work  [x] (82048) Reviewed/Progressed HEP activities related to improving balance, coordination, kinesthetic sense, posture, motor skill, proprioception of scapular, scapulothoracic and UE control with self care, reaching, carrying, lifting, house/yardwork, driving/computer work      Manual Treatments:    [x] (33650) Provided manual therapy to mobilize soft tissue/joints of cervical/CT, scapular GHJ and UE for the purpose of modulating pain, promoting relaxation,  increasing ROM, reducing/eliminating soft tissue swelling/inflammation/restriction, improving soft tissue extensibility and allowing for proper ROM for normal function with self care, reaching, carrying, lifting, house/yardwork, driving/computer work    Modalities:      Charges:  Timed Code Treatment Minutes: 60   Total Treatment Minutes: 60   Time in: 9:00  Time out: 10:00    [] EVAL (LOW) 33850 (typically 20 minutes face-to-face)  [] EVAL (MOD) 60189 (typically 30 minutes face-to-face)  [] EVAL (HIGH) 54436 (typically 45 minutes face-to-face)  [] RE-EVAL     [x] ZK(36264) x  3  [] IONTO  [] NMR (59558) x     [] VASO  [x] Manual (39255) x 1     [] Other:  [] TA x      [] Mech Traction (67602)  [] ES(attended) (22612)      [] ES (un) (70940):     GOALS:  Patient stated goal: no pain, better movement    [x]? Progressing: []? Met: []? Not Met: []? Adjusted     Therapist goals for Patient:   Short Term Goals: To be achieved in: 4-6 weeks 2/27/20-3/12/20  1. Independent in HEP and progression per patient tolerance, in order to prevent re-injury. []? Progressing: [x]? Met: []? Not Met: []? Adjusted    2. Patient will have a decrease in pain to facilitate improvement in movement, function, and ADLs as indicated by Functional Deficits. []? Progressing: [x]? Met: []? Not Met: []? Adjusted    3. Patient will tolerate progression from recliner to bed for sleeping.  []? Progressing: [x]? Met: []? Not Met: []? Adjusted      4. Patient will tolerate progression out of sling.  []? Progressing: [x]? Met: []? Not Met: []? Adjusted      Long Term Goals: To be achieved in: 6 months 7/16/20  1. Disability index score of 10% or less for the UEFS to assist with reaching prior level of function. []? Progressing: []? Met: []? Not Met: []? Adjusted  2. Patient will demonstrate increased AROM to WNL to allow for proper joint functioning as indicated by patients Functional Deficits. []? Progressing: []? Met: []? Not Met: []? Adjusted  3. Patient will demonstrate an increase in RUE strength to good scapular and core control  for UE to allow for proper functional mobility as indicated by patients Functional Deficits. []? Progressing: []? Met: []? Not Met: []? Adjusted  4.  Patient will return to ADLs, IADLs and along with most recent update on progress.

## 2020-06-24 ENCOUNTER — HOSPITAL ENCOUNTER (OUTPATIENT)
Dept: PHYSICAL THERAPY | Age: 70
Setting detail: THERAPIES SERIES
Discharge: HOME | End: 2020-06-24
Payer: MEDICARE

## 2020-06-24 PROCEDURE — 97110 THERAPEUTIC EXERCISES: CPT | Performed by: PHYSICAL THERAPIST

## 2020-06-24 PROCEDURE — 97112 NEUROMUSCULAR REEDUCATION: CPT | Performed by: PHYSICAL THERAPIST

## 2020-06-24 NOTE — FLOWSHEET NOTE
[facility]    Physical Therapy Daily Treatment Note  Date:  2020    Patient Name:  Yash Thorne    :  1950  MRN: [id number]  Restrictions/Precautions:    Medical/Treatment Diagnosis Information:  · Diagnosis: M25.511 (ICD-10-CM) - Acute pain of right shoulder  · S/p Arthroscopic revision of subacromial decompression, arthroscopic removal of retained sutures, arthroscopic subtotal synovectomy, arthroscopic repair of separate subscapularis and then supraspinatus/infraspinatus rotator cuff tendon tears with collagen patch augmentation for the latter   · DOS 20  · Treatment Diagnosis: M75.101, M25.511, R53.1 leading to impaired ADLs and IADLs  Insurance/Certification information:  PT Insurance Information: PT BENEFITS 2020 FACILITY/ MEDICARE PRIMARY/ PAYS 80%/ NO VISIT LIMIT MED NEC/ [employer] SECONDARY INS/ 20 PAG  Physician Information:  Referring Practitioner: Wong Ng MD  Has the plan of care been signed (Y/N):        []  Yes  [x]  No     Date of Patient follow up with Physician:  20      Is this a Progress Report:     []  Yes  [x]  No        If Yes:  Date Range for reporting period:  Beginning   Ending     Progress report will be due (10 Rx or 30 days whichever is less):       Recertification will be due (POC Duration  / 90 days whichever is less): 20        Visit # Insurance Allowable Auth Required   12 MEDICARE []  Yes [x]  No        Functional Scale:    Date assessed:   UEFI 880=10% (90% deficit)   20  UEFI 21/80=26.25% (73.75% deficit)  3/18/20  UEFI 68/80=85% (15% deficit)  6/10/20    Latex Allergy:  [x]NO      []YES  Preferred Language for Healthcare:   [x]English       []other:    Pain level:      SUBJECTIVE:  Pt is 21 weeks po. Pt states his shoulder is feeling good, no changes in status to report, no complaints.  +HEP    OBJECTIVE:  Right shoulder             ROM PROM AROM  Comment     L R L R   face-to-face)  [] EVAL (HIGH) 62319 (typically 45 minutes face-to-face)  [] RE-EVAL     [x] KV(61838) x  2  [] IONTO  [x] NMR (29847) x 1     [] VASO  [] Manual (81686) x      [] Other:  [] TA x      [] Mech Traction (01428)  [] ES(attended) (58364)      [] ES (un) (99478):     GOALS:  Patient stated goal: no pain, better movement    [x]? Progressing: []? Met: []? Not Met: []? Adjusted     Therapist goals for Patient:   Short Term Goals: To be achieved in: 4-6 weeks 2/27/20-3/12/20  1. Independent in HEP and progression per patient tolerance, in order to prevent re-injury. []? Progressing: [x]? Met: []? Not Met: []? Adjusted    2. Patient will have a decrease in pain to facilitate improvement in movement, function, and ADLs as indicated by Functional Deficits. []? Progressing: [x]? Met: []? Not Met: []? Adjusted    3. Patient will tolerate progression from recliner to bed for sleeping.  []? Progressing: [x]? Met: []? Not Met: []? Adjusted      4. Patient will tolerate progression out of sling.  []? Progressing: [x]? Met: []? Not Met: []? Adjusted      Long Term Goals: To be achieved in: 6 months 7/16/20  1. Disability index score of 10% or less for the UEFS to assist with reaching prior level of function. []? Progressing: []? Met: []? Not Met: []? Adjusted  2. Patient will demonstrate increased AROM to WNL to allow for proper joint functioning as indicated by patients Functional Deficits. []? Progressing: []? Met: []? Not Met: []? Adjusted  3. Patient will demonstrate an increase in RUE strength to good scapular and core control  for UE to allow for proper functional mobility as indicated by patients Functional Deficits. []? Progressing: []? Met: []? Not Met: []? Adjusted  4. Patient will return to ADLs, IADLs and functional activities without increased symptoms or restriction. []? Progressing: []? Met: []? Not Met: []? Adjusted  5.  Patient will tolerate progressive return to light weight lifting and swimming. []? Progressing: []? Met: []? Not Met: []? Adjusted    Overall Progression Towards Functional goals/ Treatment Progress Update:  [x] Patient is progressing as expected towards functional goals listed. [] Progression is slowed due to complexities/Impairments listed. [] Progression has been slowed due to co-morbidities. [] Plan just implemented, too soon to assess goals progression <30days   [] Goals require adjustment due to lack of progress  [] Patient is not progressing as expected and requires additional follow up with physician  [] Other    Prognosis for POC: [x] Good [] Fair  [] Poor      Patient requires continued skilled intervention: [x] Yes  [] No    Treatment/Activity Tolerance:  [x] Patient able to complete treatment  [] Patient limited by fatigue  [] Patient limited by pain     [] Patient limited by other medical complications  [x] Other: Pt continues to require VCs for proper form during ER to reduce bicep compensation. Continues to have mild shoulder shrug with standing ABD, likely d/t RC weakness leading to superior translation of humeral head. Pt challenged by ball on wall, difficulty maintain arm at 90 deg flex d/t weakness, required VCs to perform slowly. Demonstrates elbow ext compensation with standing ER when rotating past neutral, VCs for form. Pt doing well, but continues to exhibit generalized RC weakness. Pt requires PT follow up to address ROM, strength and functional mobility deficits. PLAN: ROM and strengthening program.   [x] Continue per plan of care [] Alter current plan (see comments above)  [] Plan of care initiated [] Hold pending MD visit [] Discharge      Electronically signed by:  Tim Fairbanks, PT, DPT  Physical Therapist  [id number]  [email or web address]    Note: If patient does not return for scheduled/ recommended follow up visits, this note will serve as a discharge from care along with most recent update on progress.

## 2020-07-01 ENCOUNTER — HOSPITAL ENCOUNTER (OUTPATIENT)
Dept: PHYSICAL THERAPY | Age: 70
Setting detail: THERAPIES SERIES
Discharge: HOME | End: 2020-07-01
Payer: MEDICARE

## 2020-07-01 ENCOUNTER — OFFICE VISIT (OUTPATIENT)
Dept: ORTHOPEDIC SURGERY | Age: 70
End: 2020-07-01
Payer: MEDICARE

## 2020-07-01 ENCOUNTER — TELEPHONE (OUTPATIENT)
Dept: PULMONOLOGY | Age: 70
End: 2020-07-01

## 2020-07-01 VITALS — BODY MASS INDEX: 34.01 KG/M2 | HEIGHT: 74 IN | WEIGHT: 264.99 LBS

## 2020-07-01 PROCEDURE — 4040F PNEUMOC VAC/ADMIN/RCVD: CPT | Performed by: ORTHOPAEDIC SURGERY

## 2020-07-01 PROCEDURE — G8417 CALC BMI ABV UP PARAM F/U: HCPCS | Performed by: ORTHOPAEDIC SURGERY

## 2020-07-01 PROCEDURE — G8427 DOCREV CUR MEDS BY ELIG CLIN: HCPCS | Performed by: ORTHOPAEDIC SURGERY

## 2020-07-01 PROCEDURE — 97110 THERAPEUTIC EXERCISES: CPT | Performed by: PHYSICAL THERAPIST

## 2020-07-01 PROCEDURE — 1123F ACP DISCUSS/DSCN MKR DOCD: CPT | Performed by: ORTHOPAEDIC SURGERY

## 2020-07-01 PROCEDURE — 99213 OFFICE O/P EST LOW 20 MIN: CPT | Performed by: ORTHOPAEDIC SURGERY

## 2020-07-01 PROCEDURE — 97112 NEUROMUSCULAR REEDUCATION: CPT | Performed by: PHYSICAL THERAPIST

## 2020-07-01 PROCEDURE — 1036F TOBACCO NON-USER: CPT | Performed by: ORTHOPAEDIC SURGERY

## 2020-07-01 PROCEDURE — 3017F COLORECTAL CA SCREEN DOC REV: CPT | Performed by: ORTHOPAEDIC SURGERY

## 2020-07-01 NOTE — PROGRESS NOTES
Review of Systems    Patient has had no medical changes since last evaluated
Sports Medicine and [address] [place] [address]    During this examination, I, John Fairbanks, functioned as a scribe for Dr. Tio Amador. The history taking and physical examination were performed by Dr. Elton Fierro. All counseling during the appointment was performed between the patient and Dr. Elton Fierro. 7/1/20   ________________  I, Dr. Tio Amador, personally performed the services described in this documentation as described by Kaitlynn Humphries ATC in my presence, and it is both accurate and complete. Praveena Fierro MD, PhD  7/1/2020

## 2020-07-01 NOTE — FLOWSHEET NOTE
[facility]    Physical Therapy Daily Treatment Note  Date:  2020    Patient Name:  Dyanne Bosworth    :  1950  MRN: [id number]  Restrictions/Precautions:    Medical/Treatment Diagnosis Information:  · Diagnosis: M25.511 (ICD-10-CM) - Acute pain of right shoulder  · S/p Arthroscopic revision of subacromial decompression, arthroscopic removal of retained sutures, arthroscopic subtotal synovectomy, arthroscopic repair of separate subscapularis and then supraspinatus/infraspinatus rotator cuff tendon tears with collagen patch augmentation for the latter   · DOS 20  · Treatment Diagnosis: M75.101, M25.511, R53.1 leading to impaired ADLs and IADLs  Insurance/Certification information:  PT Insurance Information: PT BENEFITS  FACILITY/ MEDICARE PRIMARY/ PAYS 80%/ NO VISIT LIMIT MED NEC/ [employer] SECONDARY INS/ 20 PAG  Physician Information:  Referring Practitioner: Neri Ballesteros MD  Has the plan of care been signed (Y/N):        []  Yes  [x]  No     Date of Patient follow up with Physician:  20      Is this a Progress Report:     []  Yes  [x]  No        If Yes:  Date Range for reporting period:  Beginning   Ending     Progress report will be due (10 Rx or 30 days whichever is less):       Recertification will be due (POC Duration  / 90 days whichever is less): 20        Visit # Insurance Allowable Auth Required   13 MEDICARE []  Yes [x]  No        Functional Scale:    Date assessed:   UEFI 8=10% (90% deficit)   20  UEFI =26.25% (73.75% deficit)  3/18/20  UEFI 68/80=85% (15% deficit)  6/10/20    Latex Allergy:  [x]NO      []YES  Preferred Language for Healthcare:   [x]English       []other:    Pain level:      SUBJECTIVE:  Pt is 22 weeks po. Pt states that his shoulder has been a little sore, thinks it is from the stretching. Notes that ball on wall make his shoulder the most sore.  Pt sees MD at 21 [phone] today.    OBJECTIVE:  Right shoulder             ROM PROM AROM  Comment     L R L R     Flexion   170    163     Abduction      180     ER    85   90/90    95     IR    43 90/90   35   right buttock          Strength L R Comment   Flexion         Abduction         ER         IR         Supraspinatus         Upper Trap         Lower Trap         Mid Trap         Biceps         Triceps            Not indicated at this time d/t surgery  Special Tests Results/Comment   Maria Esther Avila     Speeds     OBriens     Apprehension      Load & Shift            Reflexes/Sensation:               [x]? Dermatomes/Myotomes intact               []? Reflexes equal and normal bilaterally Not assessed              []? Other:     Joint mobility:               []? Normal               []? Hypo              []? Hyper     Palpation: Generalized TTP around shoulder     Functional Mobility/Transfers: Independent     Posture: WNL     Bandages/Dressings/Incisions:   1/30/20 Post op dressings removed by MD on 1/28/20. Steri strips in place.   6/10/20 Well healed       RESTRICTIONS/PRECAUTIONS:     Exercises/Interventions: Right shoulder  Exercises:  Exercise/Equipment Resistance/Repetitions Other comments   Stretching/PROM     Wand    Table Slides UE [place]    Pulleys    Pendulum     Elbow flex/ext     Door way stretch 3x30\"    IR stretch [id number]    Sleeper stretch [id number]         Isometrics     Retraction          Weight shift     Flexion     Abduction     External Rotation     Internal Rotation     Biceps     Triceps          PRE's     Wrist flex     Wrist ext     Flexion 3x12 2# Table at 30 deg incline   Abduction 3x15 2# Standing   External Rotation 3x12 5#    Internal Rotation 3x10 10#    Shrugs     EXT 3x10 10#    Chest press + serratus press 3x15 12#    Serratus     Horizontal Abd with ER 3x10 4#    Biceps     Triceps     Bent over Row 3x15 12#         Cable Column/Theraband     External Rotation 3x10 blue    Internal Rotation Shrugs     Lats     Ext     Flex     Scapular Retraction     BIC     TRIC     PNF          Dynamic Stability     Ball on wall 3x10 up/down, side/side, CW/CCW Pink ball   Body blade 3x30\" side to side Small blade, arm at side, neutral ER/IR        [place]          Manual interventions     Manual PROM    STM       Therapeutic Exercise and NMR EXR  [x] (82740) Provided verbal/tactile cueing for activities related to strengthening, flexibility, endurance, ROM  for improvements in scapular, scapulothoracic and UE control with self care, reaching, carrying, lifting, house/yardwork, driving/computer work. [x] (38498) Provided verbal/tactile cueing for activities related to improving balance, coordination, kinesthetic sense, posture, motor skill, proprioception  to assist with  scapular, scapulothoracic and UE control with self care, reaching, carrying, lifting, house/yardwork, driving/computer work. Therapeutic Activities:    [x] (33143 or 58666) Provided verbal/tactile cueing for activities related to improving balance, coordination, kinesthetic sense, posture, motor skill, proprioception and motor activation to allow for proper function of scapular, scapulothoracic and UE control with self care, carrying, lifting, driving/computer work. Home Exercise Program: Pt access code: Moose Kelley  Initiated 1/30/20. Printed hand out given. Pt demonstrated proper form of each exercise and expressed verbal understanding of frequency and duration. Updated 4/7/20. Printed handout provided. Updated 7/1/20. Printed hand out provided.   [x] (16978) Reviewed/Progressed HEP activities related to strengthening, flexibility, endurance, ROM of scapular, scapulothoracic and UE control with self care, reaching, carrying, lifting, house/yardwork, driving/computer work  [x] (79456) Reviewed/Progressed HEP activities related to improving balance, coordination, kinesthetic sense, posture, motor skill, proprioception of scapular, less for the UEFS to assist with reaching prior level of function. []? Progressing: []? Met: []? Not Met: []? Adjusted  2. Patient will demonstrate increased AROM to WNL to allow for proper joint functioning as indicated by patients Functional Deficits. []? Progressing: []? Met: []? Not Met: []? Adjusted  3. Patient will demonstrate an increase in RUE strength to good scapular and core control  for UE to allow for proper functional mobility as indicated by patients Functional Deficits. []? Progressing: []? Met: []? Not Met: []? Adjusted  4. Patient will return to ADLs, IADLs and functional activities without increased symptoms or restriction. []? Progressing: []? Met: []? Not Met: []? Adjusted  5. Patient will tolerate progressive return to light weight lifting and swimming. []? Progressing: []? Met: []? Not Met: []? Adjusted    Overall Progression Towards Functional goals/ Treatment Progress Update:  [x] Patient is progressing as expected towards functional goals listed. [] Progression is slowed due to complexities/Impairments listed. [] Progression has been slowed due to co-morbidities. [] Plan just implemented, too soon to assess goals progression <30days   [] Goals require adjustment due to lack of progress  [] Patient is not progressing as expected and requires additional follow up with physician  [] Other    Prognosis for POC: [x] Good [] Fair  [] Poor      Patient requires continued skilled intervention: [x] Yes  [] No    Treatment/Activity Tolerance:  [x] Patient able to complete treatment  [] Patient limited by fatigue  [] Patient limited by pain     [] Patient limited by other medical complications  [x] Other: Pt tolerated increased reps and weights on various exercises well. Continues to require VCs for proper arm positioning during ER exercises. VCs to perform prone exercises slowly.  Performed ball on wall with body rotated 45 deg rather than straight on and pt tolerated well, reported reduced soreness at anterior shoulder. Pt doing well, but continues to exhibit generalized RC weakness. Pt requires PT follow up to address ROM, strength and functional mobility deficits. PLAN: ROM and strengthening program.   [x] Continue per plan of care [] Alter current plan (see comments above)  [] Plan of care initiated [] Hold pending MD visit [] Discharge      Electronically signed by:  Nargis Calloway, PT, DPT, OCS  Physical Therapist  [email or web address]  [email or web address]    Note: If patient does not return for scheduled/ recommended follow up visits, this note will serve as a discharge from care along with most recent update on progress.

## 2020-07-01 NOTE — LETTER
Physical Therapy Rehabilitation Referral    Patient Name:  Chapo Means      YOB: 1950    Diagnosis:    1. S/P shoulder surgery        Precautions:     [x] Evaluate and Treat    Post Op Instructions:  [] Continuous passive motion (CPM) [] Elbow ROM  [x] Exercise in plane of scapula  []  Strengthening     [] Pulley and instruction   [x] Home exercise program (copy to patient)   [] Sling when arm at risk  [] Sling or brace at all times   [x] AAROM: Forward elevation to  140            [x] AAROM: External rotation  To  40    [] Isometric external rotator strengthening [x] AAROM: internal rotation: up the back  [x] Isometric abductor strengthening  [x] AAROM: Internal abduction   [] Isometric internal rotator strengthening [x] AAROM: cross-body adduction             Stretching:     Strengthening:  [x] Four quadrant (FE, ER, IR, CBA)  [x] Rotator cuff (ER, IR, Abd)  [x] Forward Elevation    [x] External Rotators     [x] External Rotation    [x] Internal Rotators  [x] Internal Rotation: up/back   [x] Abductors     [x] Internal Rotation: supine in abduction  [x] Sleeper Stretch    [x] Flexors  [x] Cross-body abduction    [x] Extensors  [x] Pendulum (FE, Abd/Add, cw/ccw)  [x] Scapular Stabilizers   [x] Wall-walking (FE, Abd)        [x] Shoulder shrugs     [] Table slides (FE)                [x] Rhomboid pinch  [] Elbow (flex, ext, pron, sup)        [] Lat.  Pull downs     [] Medial epicondylitis program       [] Forward punch   [] Lateral epicondylitis program       [] Internal rotators     [x] Progressive resistive exercises  [] Bench Press        [] Bench press plus  Activities:     [] Lateral pull-downs  [x] Rowing     [] Progressive two-hand supine press  [] Stepper/Exercise bike   [x] Biceps: curls/supination  [] Swimming  [] Water exercises    Modalities:     Return to Sport:  [x] Of Choice      [] Plyometrics  [] Ultrasound     [] Rhythmic stabilization

## 2020-07-08 ENCOUNTER — HOSPITAL ENCOUNTER (OUTPATIENT)
Dept: PHYSICAL THERAPY | Age: 70
Setting detail: THERAPIES SERIES
Discharge: HOME | End: 2020-07-08
Payer: MEDICARE

## 2020-07-08 PROCEDURE — 97110 THERAPEUTIC EXERCISES: CPT | Performed by: PHYSICAL THERAPIST

## 2020-07-08 NOTE — FLOWSHEET NOTE
The [address] [place] [address] and [facility]    Physical Therapy Daily Treatment Note  Date:  2020    Patient Name:  Ashleigh Mccoy    :  1950  MRN: [id number]  Restrictions/Precautions:    Medical/Treatment Diagnosis Information:  · Diagnosis: M25.511 (ICD-10-CM) - Acute pain of right shoulder  · S/p Arthroscopic revision of subacromial decompression, arthroscopic removal of retained sutures, arthroscopic subtotal synovectomy, arthroscopic repair of separate subscapularis and then supraspinatus/infraspinatus rotator cuff tendon tears with collagen patch augmentation for the latter   · DOS 20  · Treatment Diagnosis: M75.101, M25.511, R53.1 leading to impaired ADLs and IADLs  Insurance/Certification information:  PT Insurance Information: PT BENEFITS  FACILITY/ MEDICARE PRIMARY/ PAYS 80%/ NO VISIT LIMIT MED NEC/ [employer] SECONDARY INS/ 20 PAG  Physician Information:  Referring Practitioner: Juliet Belle MD  Has the plan of care been signed (Y/N):        []  Yes  [x]  No     Date of Patient follow up with Physician:  As needed      Is this a Progress Report:     []  Yes  [x]  No        If Yes:  Date Range for reporting period:  Beginning  6/10/20  Ending 20    Progress report will be due (10 Rx or 30 days whichever is less):       Recertification will be due (POC Duration  / 90 days whichever is less): 20        Visit # Insurance Allowable Auth Required   14 MEDICARE []  Yes [x]  No        Functional Scale:    Date assessed:   UEFI =10% (90% deficit)   20  UEFI 80=26.25% (73.75% deficit)  3/18/20  UEFI 68/80=85% (15% deficit)  6/10/20  UEFI 75/80=93.75% (6.25% deficit)  20    Latex Allergy:  [x]NO      []YES  Preferred Language for Healthcare:   [x]English       []other:    Pain level:      SUBJECTIVE:  Pt is 23 weeks po.  Pt saw MD after LPV, states visit went well and he is pleased with progress, wants him to decreased PT to 1x every other week. 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 Pt states he has had a few twinges over the last week, but nothing major. States that the ball back and forth is the hardest exercise. OBJECTIVE:  Right shoulder             ROM PROM AROM  Comment     L R L R     Flexion   170    165     Abduction      180     ER        100     IR    43 90/90   35           Strength L R Comment   Flexion  3+/5  4+/5     Abduction  3+/5 4+/5      ER  4/5 4+/5     IR  4+/5  4+/5     Upper Trap         Lower Trap         Mid Trap         Biceps         Triceps              Special Tests Results/Comment   Tip-Gonsalo  -   [place]  -   Speeds  -   [place]  -   Apprehension   -   Load & Shift   -         Reflexes/Sensation:               [x]? Dermatomes/Myotomes intact               []? Reflexes equal and normal bilaterally Not assessed              []? Other:     Joint mobility:               [x]? Normal               []? Hypo              []? Hyper     Palpation: No TTP around shoulder joint     Functional Mobility/Transfers: Independent     Posture: WNL     Bandages/Dressings/Incisions:   1/30/20 Post op dressings removed by MD on 1/28/20. Steri strips in place.   6/10/20 Well healed       RESTRICTIONS/PRECAUTIONS:     Exercises/Interventions: Right shoulder  Exercises:  Exercise/Equipment Resistance/Repetitions Other comments   Stretching/PROM     Wand    Table Slides UE [place]    Pulleys    Pendulum     Elbow flex/ext     Door way stretch 3x30\"    IR stretch [id number]    Sleeper stretch [id number]         Isometrics     Retraction          Weight shift     Flexion     Abduction     External Rotation     Internal Rotation     Biceps     Triceps          PRE's     Wrist flex     Wrist ext     Flexion 3x12 2# Table at 45 deg incline   Abduction 3x15 2# Standing   External Rotation 3x12 5# Internal Rotation 3x10 10#    Shrugs     EXT     Chest press + serratus press 3x15 12#    Serratus     Horizontal Abd with ER 3x10 4#    Biceps     Triceps     Bent over Row          Cable Column/Theraband     External Rotation 3x10 blue    Internal Rotation     Shrugs     Lats     Ext     Flex     Scapular Retraction     BIC     TRIC     PNF 2x10 red, D2 flex/ext Supine, band held at left pocked        Dynamic Stability     Ball on wall  Pink ball   Body blade 3x30\" side to side Small blade, arm at side, neutral ER/IR        Plyoback          Manual interventions     Manual PROM    STM       Therapeutic Exercise and NMR EXR  [x] (87855) Provided verbal/tactile cueing for activities related to strengthening, flexibility, endurance, ROM  for improvements in scapular, scapulothoracic and UE control with self care, reaching, carrying, lifting, house/yardwork, driving/computer work. [x] (91346) Provided verbal/tactile cueing for activities related to improving balance, coordination, kinesthetic sense, posture, motor skill, proprioception  to assist with  scapular, scapulothoracic and UE control with self care, reaching, carrying, lifting, house/yardwork, driving/computer work. Therapeutic Activities:    [x] (37498 or 94515) Provided verbal/tactile cueing for activities related to improving balance, coordination, kinesthetic sense, posture, motor skill, proprioception and motor activation to allow for proper function of scapular, scapulothoracic and UE control with self care, carrying, lifting, driving/computer work. Home Exercise Program: Pt access code: Trupti [place]  Initiated 1/30/20. Printed hand out given. Pt demonstrated proper form of each exercise and expressed verbal understanding of frequency and duration. Updated 4/7/20. Printed handout provided. Updated 7/1/20. Printed hand out provided.   [x] (45088) Reviewed/Progressed HEP activities related to strengthening, flexibility, endurance, ROM of scapular, scapulothoracic and UE control with self care, reaching, carrying, lifting, house/yardwork, driving/computer work  [x] (88546) Reviewed/Progressed HEP activities related to improving balance, coordination, kinesthetic sense, posture, motor skill, proprioception of scapular, scapulothoracic and UE control with self care, reaching, carrying, lifting, house/yardwork, driving/computer work      Manual Treatments:    [x] (54274) Provided manual therapy to mobilize soft tissue/joints of cervical/CT, scapular GHJ and UE for the purpose of modulating pain, promoting relaxation,  increasing ROM, reducing/eliminating soft tissue swelling/inflammation/restriction, improving soft tissue extensibility and allowing for proper ROM for normal function with self care, reaching, carrying, lifting, house/yardwork, driving/computer work    Modalities:  None    Charges:  Timed Code Treatment Minutes: 45   Total Treatment Minutes: 45   Time in: 9:15  Time out: 10:00    [] EVAL (LOW) 58062 (typically 20 minutes face-to-face)  [] EVAL (MOD) 75720 (typically 30 minutes face-to-face)  [] EVAL (HIGH) 38024 (typically 45 minutes face-to-face)  [] RE-EVAL     [x] FQ(85735) x  3  [] IONTO  [] NMR (03258) x     [] VASO  [] Manual (46685) x      [] Other:  [] TA x      [] Mech Traction (60285)  [] ES(attended) (57543)      [] ES (un) (61513):     GOALS:  Patient stated goal: no pain, better movement    [x]? Progressing: []? Met: []? Not Met: []? Adjusted     Therapist goals for Patient:   Short Term Goals: To be achieved in: 4-6 weeks 2/27/20-3/12/20  1. Independent in HEP and progression per patient tolerance, in order to prevent re-injury. []? Progressing: [x]? Met: []? Not Met: []? Adjusted    2. Patient will have a decrease in pain to facilitate improvement in movement, function, and ADLs as indicated by Functional Deficits. []? Progressing: [x]? Met: []? Not Met: []? Adjusted    3.  Patient will tolerate progression from

## 2020-07-15 ENCOUNTER — VIRTUAL VISIT (OUTPATIENT)
Dept: PULMONOLOGY | Age: 70
End: 2020-07-15
Payer: MEDICARE

## 2020-07-15 ENCOUNTER — APPOINTMENT (OUTPATIENT)
Dept: PHYSICAL THERAPY | Age: 70
End: 2020-07-15
Payer: MEDICARE

## 2020-07-15 PROCEDURE — G8427 DOCREV CUR MEDS BY ELIG CLIN: HCPCS | Performed by: NURSE PRACTITIONER

## 2020-07-15 PROCEDURE — 4040F PNEUMOC VAC/ADMIN/RCVD: CPT | Performed by: NURSE PRACTITIONER

## 2020-07-15 PROCEDURE — 3017F COLORECTAL CA SCREEN DOC REV: CPT | Performed by: NURSE PRACTITIONER

## 2020-07-15 PROCEDURE — 1123F ACP DISCUSS/DSCN MKR DOCD: CPT | Performed by: NURSE PRACTITIONER

## 2020-07-15 PROCEDURE — 99214 OFFICE O/P EST MOD 30 MIN: CPT | Performed by: NURSE PRACTITIONER

## 2020-07-15 ASSESSMENT — SLEEP AND FATIGUE QUESTIONNAIRES
HOW LIKELY ARE YOU TO NOD OFF OR FALL ASLEEP WHILE WATCHING TV: 1
HOW LIKELY ARE YOU TO NOD OFF OR FALL ASLEEP WHILE SITTING QUIETLY AFTER LUNCH WITHOUT ALCOHOL: 1
HOW LIKELY ARE YOU TO NOD OFF OR FALL ASLEEP WHILE SITTING AND READING: 1
HOW LIKELY ARE YOU TO NOD OFF OR FALL ASLEEP WHILE LYING DOWN TO REST IN THE AFTERNOON WHEN CIRCUMSTANCES PERMIT: 2
HOW LIKELY ARE YOU TO NOD OFF OR FALL ASLEEP WHILE SITTING AND TALKING TO SOMEONE: 0
ESS TOTAL SCORE: 5
HOW LIKELY ARE YOU TO NOD OFF OR FALL ASLEEP IN A CAR, WHILE STOPPED FOR A FEW MINUTES IN TRAFFIC: 0
HOW LIKELY ARE YOU TO NOD OFF OR FALL ASLEEP WHILE SITTING INACTIVE IN A PUBLIC PLACE: 0
HOW LIKELY ARE YOU TO NOD OFF OR FALL ASLEEP WHEN YOU ARE A PASSENGER IN A CAR FOR AN HOUR WITHOUT A BREAK: 0

## 2020-07-15 NOTE — ASSESSMENT & PLAN NOTE
Reviewed compliance download with pt. Supplies and parts as needed for his machine. These are medically necessary. Continue medications per his PCP and other physicians. Limit caffeine use after 3pm.  Encouraged him to work on weight loss through diet and exercise. Diagnoses of Class 1 obesity without serious comorbidity with body mass index (BMI) of 32.0 to 32.9 in adult, unspecified obesity type, Essential hypertension, and Allergic rhinitis, unspecified seasonality, unspecified trigger were pertinent to this visit. The chronic medical conditions listed are directly related to the primary diagnosis listed above. The management of the primary diagnosis affects the secondary diagnosis and vice versa.

## 2020-07-15 NOTE — PROGRESS NOTES
Gina Martinez         : 1950    Diagnosis: [x] ISAURO (G47.33) [] CSA (G47.31) [] Apnea (G47.30)   Length of Need: [x] 12 Months [] 99 Months [] Other:    Machine (LILIA!): [x] Respironics Dream Station      Auto [] ResMed AirSense     Auto [] Other:     []  CPAP () [] Bilevel ()   Mode: [] Auto [] Spontaneous    Mode: [] Auto [] Spontaneous                            Comfort Settings:   - Ramp Pressure:  cmH2O                                        - Ramp time: 15 min                                     -  Flex/EPR - 3 full time                                    - For ResMed Bilevel (TiMax-4 sec   TiMin- 0.2 sec)     Humidifier: [x] Heated ()        [x] Water chamber replacement ()/ 1 per 6 months        Mask:   [x] Nasal () /1 per 3 months [] Full Face () /1 per 3 months   [x] Patient choice -Size and fit mask [] Patient Choice - Size and fit mask   [] Dispense:  [] Dispense:    [x] Headgear () / 1 per 3 months [] Headgear () / 1 per 3 months   [x] Replacement Nasal Cushion ()/2 per month [] Interface Replacement ()/1 per month   [] Replacement Nasal Pillows ()/2 per month         Tubing: [x] Heated ()/1 per 3 months    [] Standard ()/1 per 3 months [] Other:           Filters: [x] Non-disposable ()/1 per 6 months     [x] Ultra-Fine, Disposable ()/2 per month        Miscellaneous: [] Chin Strap ()/ 1 per 6 months [] O2 bleed-in:       LPM   [] Oximetry on CPAP/Bilevel []  Other:    [x] Modem: ()         Start Order Date: 07/15/20    MEDICAL JUSTIFICATION:  I, the undersigned, certify that the above prescribed supplies are medically necessary for this patients wellbeing. In my opinion, the supplies are both reasonable and necessary in reference to accepted standards of medicalpractice in treatment of this patients condition.     Shelbie Duran KATHRYN - CNP      NPI: [id number]       Order Signed Date: 07/15/20    Electronically signed by KATHRYN Hooks CNP on 7/15/2020 at 10:04 AM

## 2020-07-15 NOTE — LETTER
[facility] Sleep Medicine  [place] [address] [address] [address]  Phone: [phone]  Fax: [phone]    July 15, 2020       Patient: Aura Galvan   MR Number: [id number]   YOB: 1950   Date of Visit: 7/15/2020       Jamia Starks was seen for a follow up visit today. Here is my assessment and plan as well as an attached copy of his visit today:    Class 1 obesity without serious comorbidity with body mass index (BMI) of 32.0 to 32.9 in adult  Chronic-Stable. Encouraged him to work on weight loss through diet and exercise. Essential hypertension  Chronic- Stable. Cont meds per PCP and other physicians. Allergic rhinitis  Chronic- Stable. Cont meds per PCP and other physicians. Obstructive sleep apnea  Reviewed compliance download with pt. Supplies and parts as needed for his machine. These are medically necessary. Continue medications per his PCP and other physicians. Limit caffeine use after 3pm.  Encouraged him to work on weight loss through diet and exercise. Diagnoses of Class 1 obesity without serious comorbidity with body mass index (BMI) of 32.0 to 32.9 in adult, unspecified obesity type, Essential hypertension, and Allergic rhinitis, unspecified seasonality, unspecified trigger were pertinent to this visit. The chronic medical conditions listed are directly related to the primary diagnosis listed above. The management of the primary diagnosis affects the secondary diagnosis and vice versa. If you have questions or concerns, please do not hesitate to call me. I look forward to following Phillip Sorensen along with you.     Sincerely,    Fuller Gilford, APRN - CNP    CC providers:  Bee Baumann MD  [address] [facility] [address] [facility] [address]  [facility]

## 2020-07-15 NOTE — PROGRESS NOTES
Akua Faye MD, FAASM, [facility]  Anna Medina, MSN, RN, CNP     [address] [facility] SLEEP MEDICINE  [place] [address] [address] [address]  Dept: [phone]  Dept Fax: : [phone] SLEEP MEDICINE  [address] [place] [id number] [phone]    Subjective:     Patient ID: Ely Madsen is a 72 y.o. male. Chief Complaint   Patient presents with    Sleep Apnea       HPI:      Sleep Medicine Video Visit    Pursuant to the emergency declaration under the [facility], [facility] waiver authority and the [employer] Act this Telephone Visit was insisted, with patient's consent, to reduce the patient's risk of exposure to COVID-19 and provide continuity of care for an established patient. Services were provided through a synchronous discussion over a telephone and/or Video chat to substitute for in-person clinic visit, and coded as such. While patient is at home. Machine Modem/Download Info:  Compliance (hours/night): 7 hrs/night  Download AHI (/hour): 7.4 /HR  Average CPAP Pressure : 15.4 cmH2O           APAP - Settings  Pressure Min: 14 cmH2O  Pressure Max: 20 cmH2O                 Comfort Settings  Humidity Level (0-8): 2  Flex/EPR (0-3): 3 PAP Mask  Mask Type: Nasal mask     [place] - Total score: 5    Follow-up :     Last Visit : January 2020      Patient reports the listed chronic Co-morbidities: HTN, Allergies, Obesity   are well controlled and stable at this time.      Subjective Health Changes: R should surgery 1/27/2020     Over Night Oximetry: [] Yes  [] No  [x] NA [] WNL   Using O2: [] Yes  [] No  [x] NA   Patient is compliant with the machine  [x] Yes  [] No   Feeling rested when using the machine   [x] Yes  [] No     Pressure is comfortable with inspiration and expiration  [x] Yes  [] No Noticed changes in pressure   [] Yes  [] No  [x] NA   Mask is fitting well  [x] Yes  [] No   Noting Mask Air Leak  [] Yes  [x] No   Having painful Aerophagia  [] Yes  [x] No   Nocturia   1-2  per night. Having  HA upon waking  [] Yes  [x] No   Dry mouth upon waking   Dry Nose  Dry Eyes  [] Yes  [x] No   Congestion upon waking   [x] Yes  [] No    Nose Bleeds  [] Yes  [x] No   Using Sleep Aides    [x] NA   Understands how to change humidification and/or tubing temperature for comfort while at home  [x] Yes  [] No     Difficulties falling asleep  [] Yes  [x] No   Difficulties staying asleep  [] Yes  [x] No   Approximate time to bed  9:30-10:30pm   Approximate wake time  5am   Taking Naps  no   If taking naps usual length    [x] NA   If taking naps using the machine  [] Yes  [] No  [x] NA [] With and With out    Drowsy when driving  [] Yes  [x] No     Does patient carry a DOT/CDL  [] Yes  [x] No     Does patient carry FAA/Pilots License   [] Yes  [x] No      Any concerns noted with the machine at this time  [] Yes  [x] No        Diagnosis Orders   1. Obstructive sleep apnea     2. Class 1 obesity without serious comorbidity with body mass index (BMI) of 32.0 to 32.9 in adult, unspecified obesity type     3. Essential hypertension     4. Allergic rhinitis, unspecified seasonality, unspecified trigger         The chronic medical conditions listed are directly related to the primary diagnosis listed above. The management of the primary diagnosis affects the secondary diagnosis and vice versa. Assessment/Plan:     Class 1 obesity without serious comorbidity with body mass index (BMI) of 32.0 to 32.9 in adult  Chronic-Stable. Encouraged him to work on weight loss through diet and exercise. Essential hypertension  Chronic- Stable. Cont meds per PCP and other physicians. Allergic rhinitis  Chronic- Stable. Cont meds per PCP and other physicians.       Obstructive sleep apnea  Reviewed compliance download with pt. Supplies and parts as needed for his machine. These are medically necessary. Continue medications per his PCP and other physicians. Limit caffeine use after 3pm.  Encouraged him to work on weight loss through diet and exercise. Diagnoses of Class 1 obesity without serious comorbidity with body mass index (BMI) of 32.0 to 32.9 in adult, unspecified obesity type, Essential hypertension, and Allergic rhinitis, unspecified seasonality, unspecified trigger were pertinent to this visit. The chronic medical conditions listed are directly related to the primary diagnosis listed above. The management of the primary diagnosis affects the secondary diagnosis and vice versa. The primary encounter diagnosis was Obstructive sleep apnea. Diagnoses of Class 1 obesity without serious comorbidity with body mass index (BMI) of 32.0 to 32.9 in adult, unspecified obesity type, Essential hypertension, and Allergic rhinitis, unspecified seasonality, unspecified trigger were also pertinent to this visit. The chronic medical conditions listed are directly related to the primary diagnosis listed above. The management of the primary diagnosis affects the secondary diagnosis and vice versa. - Educated patient and reviewed compliance download with pt.    -Supplies and parts as needed for his machine, these are medically necessary.    - Patient using [employer] for supplies  -Continue medications per his PCP and other physicians.   -Limit caffeine use after 3pm.    -Encouraged him to work on weight loss through diet and exercise. -  Patient able to access video feed. Visit completed via video chat communications. 20 min spent with patient.   - Education on higher AHI and likely linkage to surgery no symptoms other wise   -F/U: 12 month. No orders of the defined types were placed in this encounter. No orders of the defined types were placed in this encounter.       No orders of the defined types were placed in this encounter.       Hanane Willis, MSN, RN, CNP

## 2020-07-15 NOTE — TELEPHONE ENCOUNTER
Sent [employer] message to contact office to schedule an appointment  Medication:   Requested Prescriptions     Pending Prescriptions Disp Refills    atorvastatin (LIPITOR) 20 MG tablet [Pharmacy Med Name: ATORVASTATIN 20 MG TABLET] 90 tablet 0     Sig: TAKE ONE TABLET BY MOUTH DAILY     Last Filled:  1/9/20    Last appt: 8/23/19   Next appt: Visit date not found    Last Lipid:   Lab Results   Component Value Date    CHOL 132 04/24/2019    TRIG 56 04/24/2019    HDL 58 04/24/2019    LDLCALC 63 04/24/2019

## 2020-07-16 RX ORDER — ATORVASTATIN CALCIUM 20 MG/1
TABLET, FILM COATED ORAL
Qty: 90 TABLET | Refills: 0 | Status: SHIPPED | OUTPATIENT
Start: 2020-07-16 | End: 2020-08-11 | Stop reason: SDUPTHER

## 2020-07-17 RX ORDER — ATORVASTATIN CALCIUM 20 MG/1
TABLET, FILM COATED ORAL
Qty: 90 TABLET | Refills: 0 | OUTPATIENT
Start: 2020-07-17

## 2020-07-17 NOTE — TELEPHONE ENCOUNTER
Medication:   Requested Prescriptions     Pending Prescriptions Disp Refills    atorvastatin (LIPITOR) 20 MG tablet [Pharmacy Med Name: ATORVASTATIN 20 MG TABLET] 90 tablet 0     Sig: TAKE ONE TABLET BY MOUTH DAILY     Last Filled:  7/16/20    Last appt: 1/9/2020   Next appt: Visit date not found    Last Lipid:   Lab Results   Component Value Date    CHOL 132 04/24/2019    TRIG 56 04/24/2019    HDL 58 04/24/2019    LDLCALC 63 04/24/2019

## 2020-07-22 ENCOUNTER — HOSPITAL ENCOUNTER (OUTPATIENT)
Dept: PHYSICAL THERAPY | Age: 70
Setting detail: THERAPIES SERIES
Discharge: HOME | End: 2020-07-22
Payer: MEDICARE

## 2020-07-22 PROCEDURE — 97112 NEUROMUSCULAR REEDUCATION: CPT | Performed by: PHYSICAL THERAPIST

## 2020-07-22 PROCEDURE — 97110 THERAPEUTIC EXERCISES: CPT | Performed by: PHYSICAL THERAPIST

## 2020-07-22 NOTE — FLOWSHEET NOTE
[facility]    Physical Therapy Daily Treatment Note  Date:  2020    Patient Name:  Ely Madsen    :  1950  MRN: [id number]  Restrictions/Precautions:    Medical/Treatment Diagnosis Information:  · Diagnosis: M25.511 (ICD-10-CM) - Acute pain of right shoulder  · S/p Arthroscopic revision of subacromial decompression, arthroscopic removal of retained sutures, arthroscopic subtotal synovectomy, arthroscopic repair of separate subscapularis and then supraspinatus/infraspinatus rotator cuff tendon tears with collagen patch augmentation for the latter   · DOS 20  · Treatment Diagnosis: M75.101, M25.511, R53.1 leading to impaired ADLs and IADLs  Insurance/Certification information:  PT Insurance Information: PT BENEFITS  FACILITY/ MEDICARE PRIMARY/ PAYS 80%/ NO VISIT LIMIT MED NEC/ [employer] SECONDARY INS/ 20 PAG  Physician Information:  Referring Practitioner: Ruchi Harris MD  Has the plan of care been signed (Y/N):        []  Yes  [x]  No     Date of Patient follow up with Physician:  As needed      Is this a Progress Report:     []  Yes  [x]  No        If Yes:  Date Range for reporting period:  Beginning  6/10/20  Ending 20    Progress report will be due (10 Rx or 30 days whichever is less): 81      Recertification will be due (POC Duration  / 90 days whichever is less): 20        Visit # Insurance Allowable Auth Required   15 MEDICARE []  Yes [x]  No        Functional Scale:    Date assessed:   UEFI 8=10% (90% deficit)   20  UEFI /80=26.25% (73.75% deficit)  3/18/20  UEFI 68/80=85% (15% deficit)  6/10/20  UEFI 75/80=93.75% (6.25% deficit)  20     Latex Allergy:  [x]NO      []YES  Preferred Language for Healthcare:   [x]English       []other:    Pain level:      SUBJECTIVE:  Pt is 25 weeks po. Pt states that he has been feeling good.  Pt notes that OH motions are most challenging/difficult d/t weakness and feels pressure at the top of his shoulder, gets tired quickly. Standing ABD and inclined flexion are toughest exercises. OBJECTIVE:  Right shoulder             ROM PROM AROM  Comment     L R L R     Flexion   170    165     Abduction      180     ER        100     IR    43 90/90   35           Strength L R Comment   Flexion  3+/5  4+/5     Abduction  3+/5 4+/5      ER  4/5 4+/5     IR  4+/5  4+/5     Upper Trap         Lower Trap         Mid Trap         Biceps         Triceps              Special Tests Results/Comment   Whelan-Gonsalo  -   Neanthony  -   Speeds  -   OBriens  -   Apprehension   -   Load & Shift   -         Reflexes/Sensation:               [x]? Dermatomes/Myotomes intact               []? Reflexes equal and normal bilaterally Not assessed              []? Other:     Joint mobility:               [x]? Normal               []? Hypo              []? Hyper     Palpation: No TTP around shoulder joint     Functional Mobility/Transfers: Independent     Posture: WNL     Bandages/Dressings/Incisions:   1/30/20 Post op dressings removed by MD on 1/28/20. Steri strips in place.   6/10/20 Well healed       RESTRICTIONS/PRECAUTIONS:     Exercises/Interventions: Right shoulder  Exercises:  Exercise/Equipment Resistance/Repetitions Other comments   Stretching/PROM     Wand    Table Slides UE [place]    Pulleys    Pendulum     Elbow flex/ext     Door way stretch    IR stretch    Sleeper stretch         Isometrics     Retraction          Weight shift     Flexion     Abduction     External Rotation     Internal Rotation     Biceps     Triceps          PRE's     Wrist flex     Wrist ext     Flexion 3x12 2# Table at 45 deg incline   Abduction 3x15 2# Standing   External Rotation 3x12 5#    Internal Rotation 3x10 10#    Shrugs     EXT     Chest press + serratus press 3x15 15#    Serratus     Horizontal Abd with ER 3x12 4#    Biceps     Triceps     Bent over Row          Cable Column/Theraband     External Rotation 3x10 blue    Internal Rotation     Shrugs     Lats     Ext     Flex     Scapular Retraction     BIC     TRIC     PNF 2x10 red, D2 flex/ext Supine, band held at left pocked   Wall slide 2x10 red tied band    Wall lateral walking x1 hallway, red tied band         Dynamic Stability     Ball on wall  Pink ball   Body blade 3x30\" side to side Small blade, arm at side, neutral ER/IR        [place]          Manual interventions     Manual PROM    STM       Therapeutic Exercise and NMR EXR  [x] (41740) Provided verbal/tactile cueing for activities related to strengthening, flexibility, endurance, ROM  for improvements in scapular, scapulothoracic and UE control with self care, reaching, carrying, lifting, house/yardwork, driving/computer work. [x] (33218) Provided verbal/tactile cueing for activities related to improving balance, coordination, kinesthetic sense, posture, motor skill, proprioception  to assist with  scapular, scapulothoracic and UE control with self care, reaching, carrying, lifting, house/yardwork, driving/computer work. Therapeutic Activities:    [x] (76639 or 25926) Provided verbal/tactile cueing for activities related to improving balance, coordination, kinesthetic sense, posture, motor skill, proprioception and motor activation to allow for proper function of scapular, scapulothoracic and UE control with self care, carrying, lifting, driving/computer work. Home Exercise Program: Pt access code: Shahnaz Gale  Initiated 1/30/20. Printed hand out given. Pt demonstrated proper form of each exercise and expressed verbal understanding of frequency and duration. Updated 4/7/20. Printed handout provided. Updated 7/1/20. Printed hand out provided.   [x] (98679) Reviewed/Progressed HEP activities related to strengthening, flexibility, endurance, ROM of scapular, scapulothoracic and UE control with self care, reaching, carrying, lifting, house/yardwork, driving/computer work  [x] (71576) Reviewed/Progressed HEP activities related to improving balance, coordination, kinesthetic sense, posture, motor skill, proprioception of scapular, scapulothoracic and UE control with self care, reaching, carrying, lifting, house/yardwork, driving/computer work      Manual Treatments:    [x] (76192) Provided manual therapy to mobilize soft tissue/joints of cervical/CT, scapular GHJ and UE for the purpose of modulating pain, promoting relaxation,  increasing ROM, reducing/eliminating soft tissue swelling/inflammation/restriction, improving soft tissue extensibility and allowing for proper ROM for normal function with self care, reaching, carrying, lifting, house/yardwork, driving/computer work    Modalities:  None    Charges:  Timed Code Treatment Minutes: 45   Total Treatment Minutes: 45   Time in: 11:07  Time out: 11:52    [] EVAL (LOW) 92661 (typically 20 minutes face-to-face)  [] EVAL (MOD) 71075 (typically 30 minutes face-to-face)  [] EVAL (HIGH) 82936 (typically 45 minutes face-to-face)  [] RE-EVAL     [x] (70703) x  2  [] IONTO  [x] NMR (12927) x 1    [] VASO  [] Manual (58350) x      [] Other:  [] TA x      [] Mech Traction (15840)  [] ES(attended) (54753)      [] ES (un) (12798):     GOALS:  Patient stated goal: no pain, better movement    [x]? Progressing: []? Met: []? Not Met: []? Adjusted     Therapist goals for Patient:   Short Term Goals: To be achieved in: 4-6 weeks 2/27/20-3/12/20  1. Independent in HEP and progression per patient tolerance, in order to prevent re-injury. []? Progressing: [x]? Met: []? Not Met: []? Adjusted    2. Patient will have a decrease in pain to facilitate improvement in movement, function, and ADLs as indicated by Functional Deficits. []? Progressing: [x]? Met: []? Not Met: []? Adjusted    3. Patient will tolerate progression from recliner to bed for sleeping.  []? Progressing: [x]? Met: []? Not Met: []? Adjusted      4. Patient will tolerate progression out of sling.  []? Progressing: [x]? throughout exercise program to before exercises more slowly. Tolerated addition of new exercises well, required VCs to keep wrists and elbows in line with each other during wall slides, difficulty keeping arms at 90 deg flex during wall walking d/t weakness. Pt requires continued RC strength and progressive OH strength. Pt requires PT follow up to address ROM, strength and functional mobility deficits. PLAN: ROM and strengthening program.   [x] Continue per plan of care [] Alter current plan (see comments above)  [] Plan of care initiated [] Hold pending MD visit [] Discharge      Electronically signed by:  Manuela Meyers, PT, DPT, OCS  Physical Therapist  [id number]  [email or web address]    Note: If patient does not return for scheduled/ recommended follow up visits, this note will serve as a discharge from care along with most recent update on progress.

## 2020-07-29 ENCOUNTER — APPOINTMENT (OUTPATIENT)
Dept: PHYSICAL THERAPY | Age: 70
End: 2020-07-29
Payer: MEDICARE

## 2020-07-29 ENCOUNTER — TELEPHONE (OUTPATIENT)
Dept: PRIMARY CARE CLINIC | Age: 70
End: 2020-07-29

## 2020-07-29 NOTE — TELEPHONE ENCOUNTER
Mary Mantilla just came for his appt this morning, and found out that today is 7.29.20 not 28. His apt was yesterday morning. He rescheduled his appt for 8.11   Mr. Mike insists to send apology for you that he got mixed up!

## 2020-08-05 ENCOUNTER — HOSPITAL ENCOUNTER (OUTPATIENT)
Dept: PHYSICAL THERAPY | Age: 70
Setting detail: THERAPIES SERIES
Discharge: HOME | End: 2020-08-05
Payer: MEDICARE

## 2020-08-05 PROCEDURE — 97110 THERAPEUTIC EXERCISES: CPT | Performed by: PHYSICAL THERAPIST

## 2020-08-05 PROCEDURE — 97112 NEUROMUSCULAR REEDUCATION: CPT | Performed by: PHYSICAL THERAPIST

## 2020-08-05 PROCEDURE — 97530 THERAPEUTIC ACTIVITIES: CPT | Performed by: PHYSICAL THERAPIST

## 2020-08-05 NOTE — FLOWSHEET NOTE
[facility] [address] [place] [address] [facility]    [facility] Therapy Daily Treatment Note  Date:  2020    Patient Name:  Tano Ayala    :  1950  MRN: [id number]  Restrictions/Precautions:    Medical/Treatment Diagnosis Information:  · Diagnosis: M25.511 (ICD-10-CM) - Acute pain of right shoulder  · S/p Arthroscopic revision of subacromial decompression, arthroscopic removal of retained sutures, arthroscopic subtotal synovectomy, arthroscopic repair of separate subscapularis and then supraspinatus/infraspinatus rotator cuff tendon tears with collagen patch augmentation for the latter   · DOS 20  · Treatment Diagnosis: M75.101, M25.511, R53.1 leading to impaired ADLs and IADLs  Insurance/Certification information:  PT Insurance Information: PT BENEFITS 2020 FACILITY/ MEDICARE PRIMARY/ PAYS 80%/ NO VISIT LIMIT MED NEC/ [employer] SECONDARY INS/ 20 PAG  Physician Information:  Referring Practitioner: Tio Amador MD  Has the plan of care been signed (Y/N):        []  Yes  [x]  No     Date of Patient follow up with Physician:  As needed      Is this a Progress Report:     []  Yes  [x]  No        If Yes:  Date Range for reporting period:  Beginning  6/10/20  Ending 20    Progress report will be due (10 Rx or 30 days whichever is less): 69      Recertification will be due (POC Duration  / 90 days whichever is less): 20        Visit # Insurance Allowable Auth Required   16 MEDICARE []  Yes [x]  No        Functional Scale:    Date assessed:   UEFI =10% (90% deficit)   20  UEFI 80=26.25% (73.75% deficit)  3/18/20  UEFI 68/80=85% (15% deficit)  6/10/20  UEFI 75/80=93.75% (6.25% deficit)  20     Latex Allergy:  [x]NO      []YES  Preferred Language for Healthcare:   [x]English       []other:    Pain level:      SUBJECTIVE:  Pt is 27 weeks po. Pt feels he has more ROM and more strength, feels like he is getting closer to full recovery.  Pt states that he hasn't been doing bicep curls because they hurt his elbows. Pt states he still feels pressure at the top of his shoulder when he goes OH, but it is improving. OBJECTIVE:  Right shoulder             ROM PROM AROM  Comment     L R L R     Flexion   170    165     Abduction      180     ER        100     IR    43 90/90   35           Strength L R Comment   Flexion  3+/5  4+/5     Abduction  3+/5 4+/5      ER  4/5 4+/5     IR  4+/5  4+/5     Upper Trap         Lower Trap         Mid Trap         Biceps         Triceps              Special Tests Results/Comment   Tip-Gonsalo  -   Neanthony  -   Speeds  -   OBriens  -   Apprehension   -   Load & Shift   -         Reflexes/Sensation:               [x]? Dermatomes/Myotomes intact               []? Reflexes equal and normal bilaterally Not assessed              []? Other:     Joint mobility:               [x]? Normal               []? Hypo              []? Hyper     Palpation: No TTP around shoulder joint     Functional Mobility/Transfers: Independent     Posture: WNL     Bandages/Dressings/Incisions:   1/30/20 Post op dressings removed by MD on 1/28/20. Steri strips in place.   6/10/20 Well healed       RESTRICTIONS/PRECAUTIONS:     Exercises/Interventions: Right shoulder  Exercises:  Exercise/Equipment Resistance/Repetitions Other comments   Stretching/PROM     Wand    Table Slides UE [place]    Pulleys    Pendulum     Elbow flex/ext     Door way stretch    IR stretch    Sleeper stretch         Isometrics     Retraction          Weight shift     Flexion     Abduction     External Rotation     Internal Rotation     Biceps     Triceps          PRE's     Wrist flex     Wrist ext     Flexion 3x12 2# Table at 45 deg incline   Abduction 3x15 2# Standing   External Rotation 3x15 5#    Internal Rotation 3x15 10#    Shrugs     EXT     Chest press + serratus press     Serratus     Horizontal Abd with ER 3x12 4#    Biceps     Triceps     Bent over Row     Push up 3x10 half wall scapulothoracic and UE control with self care, reaching, carrying, lifting, house/yardwork, driving/computer work  [x] (29546) Reviewed/Progressed HEP activities related to improving balance, coordination, kinesthetic sense, posture, motor skill, proprioception of scapular, scapulothoracic and UE control with self care, reaching, carrying, lifting, house/yardwork, driving/computer work      Manual Treatments:    [x] (07987) Provided manual therapy to mobilize soft tissue/joints of cervical/CT, scapular GHJ and UE for the purpose of modulating pain, promoting relaxation,  increasing ROM, reducing/eliminating soft tissue swelling/inflammation/restriction, improving soft tissue extensibility and allowing for proper ROM for normal function with self care, reaching, carrying, lifting, house/yardwork, driving/computer work    Modalities:  None    Charges:  Timed Code Treatment Minutes: 60   Total Treatment Minutes: 60   Time in: 9:10  Time out: 10:10    [] EVAL (LOW) 30174 (typically 20 minutes face-to-face)  [] EVAL (MOD) 42171 (typically 30 minutes face-to-face)  [] EVAL (HIGH) 35138 (typically 45 minutes face-to-face)  [] RE-EVAL     [x] VK(04223) x  2  [] IONTO  [x] NMR (83652) x 1    [] VASO  [] Manual (73212) x      [] Other:  [x] TA x 1     [] Mech Traction (66834)  [] ES(attended) (23011)      [] ES (un) (33949):     GOALS:  Patient stated goal: no pain, better movement    [x]? Progressing: []? Met: []? Not Met: []? Adjusted     Therapist goals for Patient:   Short Term Goals: To be achieved in: 4-6 weeks 2/27/20-3/12/20  1. Independent in HEP and progression per patient tolerance, in order to prevent re-injury. []? Progressing: [x]? Met: []? Not Met: []? Adjusted    2. Patient will have a decrease in pain to facilitate improvement in movement, function, and ADLs as indicated by Functional Deficits. []? Progressing: [x]? Met: []? Not Met: []? Adjusted    3.  Patient will tolerate progression from recliner to bed for sleeping.  []? Progressing: [x]? Met: []? Not Met: []? Adjusted      4. Patient will tolerate progression out of sling.  []? Progressing: [x]? Met: []? Not Met: []? Adjusted      Long Term Goals: To be achieved in: 6 months 7/16/20  1. Disability index score of 10% or less for the UEFS to assist with reaching prior level of function. [x]? Progressing: []? Met: []? Not Met: []? Adjusted  2. Patient will demonstrate increased AROM to WNL to allow for proper joint functioning as indicated by patients Functional Deficits. [x]? Progressing: []? Met: []? Not Met: []? Adjusted  3. Patient will demonstrate an increase in RUE strength to good scapular and core control  for UE to allow for proper functional mobility as indicated by patients Functional Deficits. [x]? Progressing: []? Met: []? Not Met: []? Adjusted  4. Patient will return to ADLs, IADLs and functional activities without increased symptoms or restriction. [x]? Progressing: []? Met: []? Not Met: []? Adjusted  5. Patient will tolerate progressive return to light weight lifting and swimming. [x]? Progressing: []? Met: []? Not Met: []? Adjusted    Overall Progression Towards Functional goals/ Treatment Progress Update:  [x] Patient is progressing as expected towards functional goals listed. [] Progression is slowed due to complexities/Impairments listed. [] Progression has been slowed due to co-morbidities.   [] Plan just implemented, too soon to assess goals progression <30days   [] Goals require adjustment due to lack of progress  [] Patient is not progressing as expected and requires additional follow up with physician  [] Other    Prognosis for POC: [x] Good [] Fair  [] Poor      Patient requires continued skilled intervention: [x] Yes  [] No    Treatment/Activity Tolerance:  [x] Patient able to complete treatment  [] Patient limited by fatigue  [] Patient limited by pain     [] Patient limited by other medical complications  [x] Other: Pt continues to require VCs during sidelying and standing ER to avoid elbow ext compensation. Pt tolerated addition of new exercises well, noted muscle fatigue without shoulder pain or discomfort, required initial cueing on rows, lat pull down and push ups to keep elbows slightly away from torso. Improved form with wall walking this date, able to maintain 90 deg shoulder flex which he was unable to do at [facility] d/t fatigue/weakness. Overall pt is progressing very well, follow up in 2 weeks, discussed with pt that he may be ready for DC to HEP at that time. Pt requires PT follow up to address ROM, strength and functional mobility deficits. PLAN: ROM and strengthening program.   [x] Continue per plan of care [] Alter current plan (see comments above)  [] Plan of care initiated [] Hold pending MD visit [] Discharge      Electronically signed by:  Srinivasa Bennett, PT, DPT, OCS  Physical Therapist  [id number]  [email or web address]    Note: If patient does not return for scheduled/ recommended follow up visits, this note will serve as a discharge from care along with most recent update on progress.

## 2020-08-11 ENCOUNTER — OFFICE VISIT (OUTPATIENT)
Dept: PRIMARY CARE CLINIC | Age: 70
End: 2020-08-11
Payer: MEDICARE

## 2020-08-11 VITALS
TEMPERATURE: 98.5 F | BODY MASS INDEX: 33.88 KG/M2 | WEIGHT: 264 LBS | SYSTOLIC BLOOD PRESSURE: 120 MMHG | RESPIRATION RATE: 14 BRPM | HEART RATE: 84 BPM | DIASTOLIC BLOOD PRESSURE: 81 MMHG | HEIGHT: 74 IN | OXYGEN SATURATION: 97 %

## 2020-08-11 PROCEDURE — 99213 OFFICE O/P EST LOW 20 MIN: CPT | Performed by: FAMILY MEDICINE

## 2020-08-11 PROCEDURE — G8417 CALC BMI ABV UP PARAM F/U: HCPCS | Performed by: FAMILY MEDICINE

## 2020-08-11 PROCEDURE — 1123F ACP DISCUSS/DSCN MKR DOCD: CPT | Performed by: FAMILY MEDICINE

## 2020-08-11 PROCEDURE — 3017F COLORECTAL CA SCREEN DOC REV: CPT | Performed by: FAMILY MEDICINE

## 2020-08-11 PROCEDURE — 4040F PNEUMOC VAC/ADMIN/RCVD: CPT | Performed by: FAMILY MEDICINE

## 2020-08-11 PROCEDURE — 1036F TOBACCO NON-USER: CPT | Performed by: FAMILY MEDICINE

## 2020-08-11 PROCEDURE — G8427 DOCREV CUR MEDS BY ELIG CLIN: HCPCS | Performed by: FAMILY MEDICINE

## 2020-08-11 RX ORDER — ATORVASTATIN CALCIUM 20 MG/1
TABLET, FILM COATED ORAL
Qty: 90 TABLET | Refills: 1 | Status: SHIPPED | OUTPATIENT
Start: 2020-08-11 | End: 2021-01-25

## 2020-08-11 RX ORDER — LISINOPRIL 10 MG/1
TABLET ORAL
Qty: 180 TABLET | Refills: 1 | Status: SHIPPED | OUTPATIENT
Start: 2020-08-11 | End: 2021-03-12

## 2020-08-11 RX ORDER — ASPIRIN 81 MG/1
81 TABLET, CHEWABLE ORAL DAILY
COMMUNITY

## 2020-08-11 NOTE — PROGRESS NOTES
Subjective:      Patient ID: Fredi Pérez is a 72 y.o. male. HPI  Hypertension: Patient here for follow-up of elevated blood pressure. He is exercising and is adherent to low salt diet. Blood pressure is well controlled at home. Cardiac symptoms none. Patient denies chest pain, chest pressure/discomfort, claudication, dyspnea, exertional chest pressure/discomfort, fatigue, irregular heart beat, lower extremity edema, near-syncope, orthopnea, palpitations and paroxysmal nocturnal dyspnea. Cardiovascular risk factors: dyslipidemia, family history of premature cardiovascular disease, male gender and obesity (BMI >= 30 kg/m2). Use of agents associated with hypertension: none. History of target organ damage: none. Abnormal blood pressure reading today in the office he feels very well when he had a few pounds extra he wanted to wait and exercise and lose some of the weight before adjust his medication his blood pressure reading usually well controlled at home. Pt with history of sleep apnea doing well on CPAP  History of hyperlipidemia on med's doing well     Review of Systems   Constitutional: Negative. HENT: Negative. All other systems reviewed and are negative.     Past Medical History:   Diagnosis Date    Colon polyps 7.26.06    x2    Hypertension     resolved with weight loss    Malaria 2011    [facility]    Obstructive sleep apnea     CPAP user    Obstructive sleep apnea (adult) (pediatric)       Past Surgical History:   Procedure Laterality Date    CYST REMOVAL  1997    back x 2    LASIK  2001    SHOULDER ARTHROSCOPY      SHOULDER SURGERY Right 1/27/2020    RIGHT SHOULDER DIAGNOSTIC ARTHROSCOPY, EUA, MALENA, REVISION SUBACROMIAL DECOMPRESSION,  EXTENSIVE DEBRIDEMENT, REVISION ROTATOR CUFF REPAIR X 2, WITH COLLOGEN GRAFT AUGMENTATION performed by Shaye Reynolds MD at [facility]  2002     Family History   Problem Relation Age of Onset    Heart Attack Father 76        smoker    Sleep Apnea Brother     Sleep Apnea Brother     Crohn's Disease Brother         healthy    Heart Attack Brother 50     Social History     Socioeconomic History    Marital status:      Spouse name: Lor Jean-Baptiste Number of children: 3    Years of education: None    Highest education level: None   Occupational History    Occupation: retired     Comment: Missionary   Social Needs    Financial resource strain: None    Food insecurity     Worry: None     Inability: None    Transportation needs     Medical: None     Non-medical: None   Tobacco Use    Smoking status: Never Smoker    Smokeless tobacco: Never Used   Substance and Sexual Activity    Alcohol use: Yes     Alcohol/week: 0.0 standard drinks     Types: 3 Glasses of wine per week     Comment: rare    Drug use: No    Sexual activity: Yes     Partners: Female     Comment: M ,4 kids 27 B,74,27,54 yo   Lifestyle    Physical activity     Days per week: None     Minutes per session: None    Stress: None   Relationships    Social connections     Talks on phone: None     Gets together: None     Attends Christianity service: None     Active member of club or organization: None     Attends meetings of clubs or organizations: None     Relationship status: None    Intimate partner violence     Fear of current or ex partner: None     Emotionally abused: None     Physically abused: None     Forced sexual activity: None   Other Topics Concern    None   Social History Narrative    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[employer]     4 children     9 G children      Current Outpatient Medications   Medication Sig Dispense Refill    aspirin 81 MG chewable tablet Take 81 mg by mouth daily      lisinopril (PRINIVIL;ZESTRIL) 10 MG tablet TAKE ONE TABLET BY MOUTH TWICE A  tablet 0    atorvastatin (LIPITOR) 20 MG tablet TAKE ONE TABLET BY MOUTH DAILY 90 tablet 0    mometasone (ELOCON) 0.1 % cream Apply topically daily.  50 g 0    Cetirizine HCl (ZYRTEC ALLERGY PO) Take by mouth No current facility-administered medications for this visit. No Known Allergies      Objective:   Physical Exam   Vitals reviewed. Constitutional: He is oriented to person, place, and time. He appears well-developed and well-nourished. HENT:   Mouth/Throat: Oropharynx is clear and moist.   Eyes: Conjunctivae are normal. No scleral icterus. Neck: Normal range of motion. Neck supple. No JVD present. Carotid bruit is not present. No thyromegaly present. Cardiovascular: Normal rate, regular rhythm, normal heart sounds and intact distal pulses. No murmur heard. Pulmonary/Chest: Effort normal and breath sounds normal. No respiratory distress. He has no wheezes. He has no rales. He exhibits no tenderness. Abdominal: Soft. Bowel sounds are normal. He exhibits no distension and no mass. There is no tenderness. There is no rebound and no guarding. Musculoskeletal: Normal range of motion. He exhibits no edema and  tenderness. at the left shoulder antonietta with internal rotation, no swelling muscle strength is 5/5   Neurological: He is alert and oriented to person, place, and time. Skin: No rash noted. Assessment:         Diagnosis Orders   1. Essential hypertension  Basic Metabolic Panel    CBC    Hepatic Function Panel    Lipid Panel   2. Hypercholesterolemia  Hepatic Function Panel    Lipid Panel   3.  Obstructive sleep apnea             Plan:      See orders, discussed healthier life style  Discussed healthier lifestyle portion control/low sodium intake and monitor blood

## 2020-08-12 DIAGNOSIS — I10 ESSENTIAL HYPERTENSION: ICD-10-CM

## 2020-08-12 DIAGNOSIS — E78.00 HYPERCHOLESTEROLEMIA: ICD-10-CM

## 2020-08-12 LAB
HCT VFR BLD CALC: 43.9 % (ref 40.5–52.5)
HEMOGLOBIN: 14.4 G/DL (ref 13.5–17.5)
MCH RBC QN AUTO: 29.3 PG (ref 26–34)
MCHC RBC AUTO-ENTMCNC: 32.7 G/DL (ref 31–36)
MCV RBC AUTO: 89.3 FL (ref 80–100)
PDW BLD-RTO: 15 % (ref 12.4–15.4)
PLATELET # BLD: 213 K/UL (ref 135–450)
PMV BLD AUTO: 7.8 FL (ref 5–10.5)
RBC # BLD: 4.91 M/UL (ref 4.2–5.9)
WBC # BLD: 5.8 K/UL (ref 4–11)

## 2020-08-13 LAB
ALBUMIN SERPL-MCNC: 4.2 G/DL (ref 3.4–5)
ALP BLD-CCNC: 41 U/L (ref 40–129)
ALT SERPL-CCNC: 11 U/L (ref 10–40)
ANION GAP SERPL CALCULATED.3IONS-SCNC: 11 MMOL/L (ref 3–16)
AST SERPL-CCNC: 18 U/L (ref 15–37)
BILIRUB SERPL-MCNC: 0.7 MG/DL (ref 0–1)
BILIRUBIN DIRECT: <0.2 MG/DL (ref 0–0.3)
BILIRUBIN, INDIRECT: NORMAL MG/DL (ref 0–1)
BUN BLDV-MCNC: 18 MG/DL (ref 7–20)
CALCIUM SERPL-MCNC: 9.3 MG/DL (ref 8.3–10.6)
CHLORIDE BLD-SCNC: 104 MMOL/L (ref 99–110)
CHOLESTEROL, TOTAL: 124 MG/DL (ref 0–199)
CO2: 24 MMOL/L (ref 21–32)
CREAT SERPL-MCNC: 0.9 MG/DL (ref 0.8–1.3)
GFR AFRICAN AMERICAN: >60
GFR NON-AFRICAN AMERICAN: >60
GLUCOSE BLD-MCNC: 95 MG/DL (ref 70–99)
HDLC SERPL-MCNC: 49 MG/DL (ref 40–60)
LDL CHOLESTEROL CALCULATED: 64 MG/DL
POTASSIUM SERPL-SCNC: 4.3 MMOL/L (ref 3.5–5.1)
SODIUM BLD-SCNC: 139 MMOL/L (ref 136–145)
TOTAL PROTEIN: 6.4 G/DL (ref 6.4–8.2)
TRIGL SERPL-MCNC: 54 MG/DL (ref 0–150)
VLDLC SERPL CALC-MCNC: 11 MG/DL

## 2020-08-19 ENCOUNTER — HOSPITAL ENCOUNTER (OUTPATIENT)
Dept: PHYSICAL THERAPY | Age: 70
Setting detail: THERAPIES SERIES
Discharge: HOME | End: 2020-08-19
Payer: MEDICARE

## 2020-08-19 PROCEDURE — 97112 NEUROMUSCULAR REEDUCATION: CPT | Performed by: PHYSICAL THERAPIST

## 2020-08-19 PROCEDURE — 97110 THERAPEUTIC EXERCISES: CPT | Performed by: PHYSICAL THERAPIST

## 2020-08-19 NOTE — DISCHARGE SUMMARY
[facility]    Physical Therapy Daily Treatment Note  Date:  2020    Patient Name:  Gloria Simpson    :  1950  MRN: [id number]  Restrictions/Precautions:    Medical/Treatment Diagnosis Information:  · Diagnosis: M25.511 (ICD-10-CM) - Acute pain of right shoulder  · S/p Arthroscopic revision of subacromial decompression, arthroscopic removal of retained sutures, arthroscopic subtotal synovectomy, arthroscopic repair of separate subscapularis and then supraspinatus/infraspinatus rotator cuff tendon tears with collagen patch augmentation for the latter   · DOS 20  · Treatment Diagnosis: M75.101, M25.511, R53.1 leading to impaired ADLs and IADLs  Insurance/Certification information:  PT Insurance Information: PT BENEFITS 2020 FACILITY/ MEDICARE PRIMARY/ PAYS 80%/ NO VISIT LIMIT MED NEC/ [employer] SECONDARY INS/ 20 PAG  Physician Information:  Referring Practitioner: Yon Vega MD  Has the plan of care been signed (Y/N):        []  Yes  [x]  No     Date of Patient follow up with Physician:  As needed      Is this a Progress Report:     []  Yes  [x]  No        If Yes:  Date Range for reporting period:  Beginning  6/10/20  Ending 20    Progress report will be due (10 Rx or 30 days whichever is less):       Recertification will be due (POC Duration  / 90 days whichever is less): 20        Visit # Insurance Allowable Auth Required   16 MEDICARE []  Yes [x]  No        Functional Scale:    Date assessed:   UEFI 8=10% (90% deficit)   20  UEFI 21/80=26.25% (73.75% deficit)  3/18/20  UEFI 68/80=85% (15% deficit)  6/10/20  UEFI 75/80=93.75% (6.25% deficit)  20   UEFI 70/80=98.75% (1.25% deficit)  20    Latex Allergy:  [x]NO      []YES  Preferred Language for Healthcare:   [x]English       []other:    Pain level:      SUBJECTIVE:  Pt is 29 weeks po.  Pt states that his shoulder is feeling almost normal. Still has soreness following exercise program, but resolves quickly. Pt states that he is able to complete all ADLs and IADLs. Does not feel limited by his shoulder. OBJECTIVE:  Right shoulder             ROM PROM AROM  Comment     L R L R     Flexion   170    165     Abduction      180     ER        100     IR    43 90/90   40          Strength L R Comment   Flexion  4-/5  4+/5     Abduction  4-/5 4+/5      ER  4/5 4+/5     IR  4+/5  4+/5     Upper Trap         Lower Trap         Mid Trap         Biceps         Triceps              Special Tests Results/Comment   Tip-Gonsalo  -   [place]  -   Speeds  -   [place]  -   Apprehension   -   Load & Shift   -         Reflexes/Sensation:               [x]? Dermatomes/Myotomes intact               [x]? Reflexes equal and normal bilaterally               []? Other:     Joint mobility:               [x]? Normal               []? Hypo              []? Hyper     Palpation: No TTP around shoulder joint     Functional Mobility/Transfers: Independent     Posture: WNL     Bandages/Dressings/Incisions:   1/30/20 Post op dressings removed by MD on 1/28/20. Steri strips in place.   6/10/20 Well healed       RESTRICTIONS/PRECAUTIONS:     Exercises/Interventions: Right shoulder  Exercises:  Exercise/Equipment Resistance/Repetitions Other comments   Stretching/PROM     Wand    Table Slides UE [place]    Pulleys    Pendulum     Elbow flex/ext     Door way stretch    IR stretch    Sleeper stretch         Isometrics     Retraction          Weight shift     Flexion     Abduction     External Rotation     Internal Rotation     Biceps     Triceps          PRE's     Wrist flex     Wrist ext     Flexion 3x10 2# Table at 60 deg incline   Abduction 3x10 3# Standing   External Rotation 3x10 6#    Internal Rotation 3x15 10#    Shrugs     EXT     Chest press + serratus press     Serratus     Horizontal Abd with ER     Biceps     Triceps     Bent over Row     Push up 3x10 half wall         [place] Column/Theraband     External Rotation 3x10 black    Internal Rotation     Shrugs     Lats 3x10 60#    Ext     Flex     Scapular Retraction 3x10 CC 60#    BIC     TRIC     PNF   2x10 D1 flex/ext, CC 25# Supine, band held at left pocked   Wall slide 2x10 red tied band    Wall lateral walking x2 hallway, red tied band         Dynamic Stability     Ball on wall  Pink ball   Body blade  Small blade, arm at side, neutral ER/IR        [place]          Manual interventions     Manual PROM    STM       Therapeutic Exercise and NMR EXR  [x] (52150) Provided verbal/tactile cueing for activities related to strengthening, flexibility, endurance, ROM  for improvements in scapular, scapulothoracic and UE control with self care, reaching, carrying, lifting, house/yardwork, driving/computer work. [x] (79412) Provided verbal/tactile cueing for activities related to improving balance, coordination, kinesthetic sense, posture, motor skill, proprioception  to assist with  scapular, scapulothoracic and UE control with self care, reaching, carrying, lifting, house/yardwork, driving/computer work. Therapeutic Activities:    [x] (99592 or 20568) Provided verbal/tactile cueing for activities related to improving balance, coordination, kinesthetic sense, posture, motor skill, proprioception and motor activation to allow for proper function of scapular, scapulothoracic and UE control with self care, carrying, lifting, driving/computer work. Home Exercise Program: Pt access code: Argenis Adams  Initiated 1/30/20. Printed hand out given. Pt demonstrated proper form of each exercise and expressed verbal understanding of frequency and duration. Updated 4/7/20. Printed handout provided. Updated 7/1/20. Printed hand out provided. Updated 8/5/20. Printed hand out provided.   [x] (73338) Reviewed/Progressed HEP activities related to strengthening, flexibility, endurance, ROM of scapular, scapulothoracic and UE control with self care, reaching, carrying, lifting, house/yardwork, driving/computer work  [x] (58276) Reviewed/Progressed HEP activities related to improving balance, coordination, kinesthetic sense, posture, motor skill, proprioception of scapular, scapulothoracic and UE control with self care, reaching, carrying, lifting, house/yardwork, driving/computer work      Manual Treatments:    [x] (43208) Provided manual therapy to mobilize soft tissue/joints of cervical/CT, scapular GHJ and UE for the purpose of modulating pain, promoting relaxation,  increasing ROM, reducing/eliminating soft tissue swelling/inflammation/restriction, improving soft tissue extensibility and allowing for proper ROM for normal function with self care, reaching, carrying, lifting, house/yardwork, driving/computer work    Modalities:  None    Charges:  Timed Code Treatment Minutes: 46   Total Treatment Minutes: 46   Time in: 9:59  Time out: 10:45    [] EVAL (LOW) 84679 (typically 20 minutes face-to-face)  [] EVAL (MOD) 47435 (typically 30 minutes face-to-face)  [] EVAL (HIGH) 21142 (typically 45 minutes face-to-face)  [] RE-EVAL     [x] XE(32428) x  2  [] IONTO  [x] NMR (25433) x 1    [] VASO  [] Manual (03041) x      [] Other:  [] TA x      [] Mech Traction (56356)  [] ES(attended) (61104)      [] ES (un) (55937):     GOALS:  Patient stated goal: no pain, better movement    [x]? Progressing: []? Met: []? Not Met: []? Adjusted     Therapist goals for Patient:   Short Term Goals: To be achieved in: 4-6 weeks 2/27/20-3/12/20  1. Independent in HEP and progression per patient tolerance, in order to prevent re-injury. []? Progressing: [x]? Met: []? Not Met: []? Adjusted    2. Patient will have a decrease in pain to facilitate improvement in movement, function, and ADLs as indicated by Functional Deficits. []? Progressing: [x]? Met: []? Not Met: []? Adjusted    3. Patient will tolerate progression from recliner to bed for sleeping.  []? Progressing: [x]?  Met: []? Not Met: []? Adjusted      4. Patient will tolerate progression out of sling.  []? Progressing: [x]? Met: []? Not Met: []? Adjusted      Long Term Goals: To be achieved in: 6 months 7/16/20   1. Disability index score of 10% or less for the UEFS to assist with reaching prior level of function. []? Progressing: [x]? Met: []? Not Met: []? Adjusted  2. Patient will demonstrate increased AROM to WNL to allow for proper joint functioning as indicated by patients Functional Deficits. []? Progressing: [x]? Met: []? Not Met: []? Adjusted  3. Patient will demonstrate an increase in RUE strength to good scapular and core control  for UE to allow for proper functional mobility as indicated by patients Functional Deficits. [x]? Progressing: []? Met: []? Not Met: []? Adjusted  4. Patient will return to ADLs, IADLs and functional activities without increased symptoms or restriction. []? Progressing: [x]? Met: []? Not Met: []? Adjusted  5. Patient will tolerate progressive return to light weight lifting and swimming. [x]? Progressing: []? Met: []? Not Met: []? Adjusted    Overall Progression Towards Functional goals/ Treatment Progress Update:  [x] Patient is progressing as expected towards functional goals listed. [] Progression is slowed due to complexities/Impairments listed. [] Progression has been slowed due to co-morbidities.   [] Plan just implemented, too soon to assess goals progression <30days   [] Goals require adjustment due to lack of progress  [] Patient is not progressing as expected and requires additional follow up with physician  [] Other    Prognosis for POC: [x] Good [] Fair  [] Poor      Patient requires continued skilled intervention: [x] Yes  [] No    Treatment/Activity Tolerance:  [x] Patient able to complete treatment  [] Patient limited by fatigue  [] Patient limited by pain     [] Patient limited by other medical complications  [x] Other: Pt demonstrates AROM WFL in all directions for R shoulder. Continues to have weakness into flex and ABD on MMT, though improved since last assessment, strength grossly 4-/5. Pt demonstrates good ER and IR strength on MMT, though functional ER weakness persists as noted with difficulty with exercise program. Despite remaining strength deficits pt is able to complete all ADLs and IADLs without pain or restriction. Pt exhibits sound understanding of HEP. Pt requires continued strength training but he is capable of completing this as an I HEP and does not require the care of a skilled PT at this time. Pt expressed understanding of HEP and is agreement with DC plan. Pt is to contact office if questions or issues arise. PLAN: ROM and strengthening program.   [] Continue per plan of care [] Alter current plan (see comments above)  [] Plan of care initiated [] Hold pending MD visit [x] Discharge      Electronically signed by:  Aniceto Soto, PT, DPT, OCS  Physical Therapist  [id number]  [email or web address]    Note: If patient does not return for scheduled/ recommended follow up visits, this note will serve as a discharge from care along with most recent update on progress.

## 2020-08-25 ENCOUNTER — OFFICE VISIT (OUTPATIENT)
Dept: PRIMARY CARE CLINIC | Age: 70
End: 2020-08-25
Payer: MEDICARE

## 2020-08-25 VITALS
TEMPERATURE: 98.2 F | WEIGHT: 262 LBS | HEART RATE: 78 BPM | HEIGHT: 74 IN | SYSTOLIC BLOOD PRESSURE: 128 MMHG | OXYGEN SATURATION: 97 % | DIASTOLIC BLOOD PRESSURE: 80 MMHG | BODY MASS INDEX: 33.62 KG/M2 | RESPIRATION RATE: 14 BRPM

## 2020-08-25 PROCEDURE — 3017F COLORECTAL CA SCREEN DOC REV: CPT | Performed by: FAMILY MEDICINE

## 2020-08-25 PROCEDURE — 1123F ACP DISCUSS/DSCN MKR DOCD: CPT | Performed by: FAMILY MEDICINE

## 2020-08-25 PROCEDURE — 4040F PNEUMOC VAC/ADMIN/RCVD: CPT | Performed by: FAMILY MEDICINE

## 2020-08-25 PROCEDURE — G0439 PPPS, SUBSEQ VISIT: HCPCS | Performed by: FAMILY MEDICINE

## 2020-08-25 ASSESSMENT — LIFESTYLE VARIABLES
HOW OFTEN DO YOU HAVE SIX OR MORE DRINKS ON ONE OCCASION: 0
HAS A RELATIVE, FRIEND, DOCTOR, OR ANOTHER HEALTH PROFESSIONAL EXPRESSED CONCERN ABOUT YOUR DRINKING OR SUGGESTED YOU CUT DOWN: 0
HOW MANY STANDARD DRINKS CONTAINING ALCOHOL DO YOU HAVE ON A TYPICAL DAY: 0
AUDIT TOTAL SCORE: 2
HAVE YOU OR SOMEONE ELSE BEEN INJURED AS A RESULT OF YOUR DRINKING: 0
HOW OFTEN DURING THE LAST YEAR HAVE YOU FAILED TO DO WHAT WAS NORMALLY EXPECTED FROM YOU BECAUSE OF DRINKING: 0
HOW OFTEN DURING THE LAST YEAR HAVE YOU FOUND THAT YOU WERE NOT ABLE TO STOP DRINKING ONCE YOU HAD STARTED: 0
HOW OFTEN DURING THE LAST YEAR HAVE YOU BEEN UNABLE TO REMEMBER WHAT HAPPENED THE NIGHT BEFORE BECAUSE YOU HAD BEEN DRINKING: 0
HOW OFTEN DO YOU HAVE A DRINK CONTAINING ALCOHOL: 2
HOW OFTEN DURING THE LAST YEAR HAVE YOU HAD A FEELING OF GUILT OR REMORSE AFTER DRINKING: 0
HOW OFTEN DURING THE LAST YEAR HAVE YOU NEEDED AN ALCOHOLIC DRINK FIRST THING IN THE MORNING TO GET YOURSELF GOING AFTER A NIGHT OF HEAVY DRINKING: 0
AUDIT-C TOTAL SCORE: 2

## 2020-08-25 ASSESSMENT — PATIENT HEALTH QUESTIONNAIRE - PHQ9
2. FEELING DOWN, DEPRESSED OR HOPELESS: 0
SUM OF ALL RESPONSES TO PHQ9 QUESTIONS 1 & 2: 0
1. LITTLE INTEREST OR PLEASURE IN DOING THINGS: 0
SUM OF ALL RESPONSES TO PHQ QUESTIONS 1-9: 0
SUM OF ALL RESPONSES TO PHQ QUESTIONS 1-9: 0

## 2020-08-25 NOTE — PATIENT INSTRUCTIONS
Personalized Preventive Plan for Estefanía Hardy - 8/25/2020  Medicare offers a range of preventive health benefits. Some of the tests and screenings are paid in full while other may be subject to a deductible, co-insurance, and/or copay. Some of these benefits include a comprehensive review of your medical history including lifestyle, illnesses that may run in your family, and various assessments and screenings as appropriate. After reviewing your medical record and screening and assessments performed today your provider may have ordered immunizations, labs, imaging, and/or referrals for you. A list of these orders (if applicable) as well as your Preventive Care list are included within your After Visit Summary for your review. Other Preventive Recommendations:    · A preventive eye exam performed by an eye specialist is recommended every 1-2 years to screen for glaucoma; cataracts, macular degeneration, and other eye disorders. · A preventive dental visit is recommended every 6 months. · Try to get at least 150 minutes of exercise per week or 10,000 steps per day on a pedometer . · Order or download the FREE \"Exercise & Physical Activity: Your Everyday Guide\" from [employer]. Call 6-691.885.9491 or search [employer] online. · You need 2660-7200 mg of calcium and 5510-3596 IU of vitamin D per day. It is possible to meet your calcium requirement with diet alone, but a vitamin D supplement is usually necessary to meet this goal.  · When exposed to the sun, use a sunscreen that protects against both UVA and UVB radiation with an SPF of 30 or greater. Reapply every 2 to 3 hours or after sweating, drying off with a towel, or swimming. · Always wear a seat belt when traveling in a car. Always wear a helmet when riding a bicycle or motorcycle.

## 2020-08-25 NOTE — PROGRESS NOTES
Medicare Annual Wellness Visit  Name: Yonatan Arroyo Date: 2020   MRN: [id number] Sex: Male   Age: 72 y.o. Ethnicity: Non-/Non    : 1950 Race: Miguel Angel Hardy is here for Medicare AWV    Screenings for behavioral, psychosocial and functional/safety risks, and cognitive dysfunction are all negative except as indicated below. These results, as well as other patient data from the [address] form, are documented in Flowsheets linked to this Encounter. No Known Allergies    Prior to Visit Medications    Medication Sig Taking? Authorizing Provider   aspirin 81 MG chewable tablet Take 81 mg by mouth daily Yes Historical Provider, MD   atorvastatin (LIPITOR) 20 MG tablet TAKE ONE TABLET BY MOUTH DAILY Yes Daniel Leong MD   lisinopril (PRINIVIL;ZESTRIL) 10 MG tablet TAKE ONE TABLET BY MOUTH TWICE Nav Ivey Yes Daniel Leong MD   mometasone (ELOCON) 0.1 % cream Apply topically daily.  Yes Daniel Leong MD   Cetirizine HCl (ZYRTEC ALLERGY PO) Take by mouth Yes Historical Provider, MD       Past Medical History:   Diagnosis Date    Colon polyps 7.26.06    x2    Hypertension     resolved with weight loss    Malaria     [facility]    Obstructive sleep apnea     CPAP user    Obstructive sleep apnea (adult) (pediatric)        Past Surgical History:   Procedure Laterality Date    CYST REMOVAL      back x 2    LASIK  2001    SHOULDER ARTHROSCOPY      SHOULDER SURGERY Right 2020    RIGHT SHOULDER DIAGNOSTIC ARTHROSCOPY, EUA, MALENA, REVISION SUBACROMIAL DECOMPRESSION,  EXTENSIVE DEBRIDEMENT, REVISION ROTATOR CUFF REPAIR X 2, WITH COLLOGEN GRAFT AUGMENTATION performed by Saurabh Doran MD at [facility] 2002       Family History   Problem Relation Age of Onset    Heart Attack Father 76        smoker    Sleep Apnea Brother     Sleep Apnea Brother     Crohn's Disease Brother         healthy    Heart Attack Brother 50       CareTeam (Including outside providers/suppliers regularly involved in providing care):   Patient Care Team:  Brandy Cameron MD as PCP - General (Family Medicine)  [facility] Cameron [facility]  Kenney Leavitt MD as Consulting Physician (Sleep Medicine)  Americo Wilkes MD as Surgeon (Orthopedic Surgery)  KATHRYN Phillips CNP as Nurse Practitioner (Nurse Practitioner)    Wt Readings from Last 3 Encounters:   08/25/20 262 lb (118.8 kg)   08/11/20 264 lb (119.7 kg)   07/01/20 264 lb 15.9 oz (120.2 kg)     Vitals:    08/25/20 1100   BP: 128/80   Site: Left Upper Arm   Position: Sitting   Cuff Size: Large Adult   Pulse: 78   Resp: 14   Temp: 98.2 °F (36.8 °C)   TempSrc: Temporal   SpO2: 97%   Weight: 262 lb (118.8 kg)   Height: 6' 2\" (1.88 m)     Body mass index is 33.64 kg/m². Based upon direct observation of the patient, evaluation of cognition reveals recent and remote memory intact.     General Appearance: alert and oriented to person, place and time, well developed and well- nourished, in no acute distress  Skin: warm and dry, no rash or erythema  Head: normocephalic and atraumatic  Eyes: pupils equal, round, and reactive to light, extraocular eye movements intact, conjunctivae normal  ENT: tympanic membrane, external ear and ear canal normal bilaterally, nose without deformity, nasal mucosa and turbinates normal without polyps  Neck: supple and non-tender without mass, no thyromegaly or thyroid nodules, no cervical lymphadenopathy  Pulmonary/Chest: clear to auscultation bilaterally- no wheezes, rales or rhonchi, normal air movement, no respiratory distress  Cardiovascular: normal rate, regular rhythm, normal S1 and S2, no murmurs, rubs, clicks, or gallops, distal pulses intact, no carotid bruits  Abdomen: soft, non-tender, non-distended, normal bowel sounds, no masses or organomegaly  Extremities: no cyanosis, clubbing or edema  Musculoskeletal: normal range of motion, no joint swelling, deformity or tenderness  Neurologic: reflexes normal and symmetric, no cranial nerve deficit, gait, coordination and speech normal    Patient's complete Health Risk Assessment and screening values have been reviewed and are found in Flowsheets. The following problems were reviewed today and where indicated follow up appointments were made and/or referrals ordered. Positive Risk Factor Screenings with Interventions:     Fall Risk:  2 or more falls in past year?: no  Fall with injury in past year?: (!) yes  Fall Risk Interventions:    · Home safety tips provided    General Health:  General  In general, how would you say your health is?: Very Good  In the past 7 days, have you experienced any of the following? New or Increased Pain, New or Increased Fatigue, Loneliness, Social Isolation, Stress or Anger?: None of These  Do you get the social and emotional support that you need?: Yes  Do you have a Living Will?: (!) No  General Health Risk Interventions:  · No Living Will: provided the state-specific advance directive document to the patient    Health Habits/Nutrition:  Health Habits/Nutrition  Do you exercise for at least 20 minutes 2-3 times per week?: (!) No  Have you lost any weight without trying in the past 3 months?: No  Do you eat fewer than 2 meals per day?: (!) Yes  Have you seen a dentist within the past year?: Yes  Body mass index is 33.64 kg/m².   Health Habits/Nutrition Interventions:  · Inadequate physical activity:  patient agrees to exercise for at least 150 minutes/week    Personalized Preventive Plan   Current Health Maintenance Status  Immunization History   Administered Date(s) Administered    Hepatitis A 11/22/2013, 07/11/2014    Pneumococcal Conjugate 13-valent ([id number]) 03/29/2018    Pneumococcal Polysaccharide ([id number]) 04/23/2019    Tdap (Boostrix, Adacel) 05/04/2011    Yellow Fever (YF-Vax) 05/23/2012    Zoster Recombinant (Shingrix) 08/14/2020        Health Maintenance   Topic Date Due    Annual Wellness Visit (AWV)  05/29/2019    Flu vaccine (1) 09/01/2020    Shingles Vaccine (2 of 2) 10/09/2020    DTaP/Tdap/Td vaccine (2 - Td) 05/04/2021    Lipid screen  08/12/2021    Potassium monitoring  08/12/2021    Creatinine monitoring  08/12/2021    Colon cancer screen colonoscopy  07/11/2028    Pneumococcal 65+ years Vaccine  Completed    Hepatitis C screen  Completed    Hepatitis A vaccine  Aged Out    Hepatitis B vaccine  Aged Out    Hib vaccine  Aged Out    Meningococcal (ACWY) vaccine  Aged Out     Recommendations for Connect[employer] Due: see orders and patient instructions/AVS.  . Recommended screening schedule for the next 5-10 years is provided to the patient in written form: see Patient Instructions/AVS.    There are no diagnoses linked to this encounter.

## 2020-09-10 NOTE — PROGRESS NOTES
Gina Martinez         : 1950 - [facility]    Diagnosis: [x] ISAURO (G47.33) [] CSA (G47.31) [] Apnea (G47.30)   Length of Need: [x] 10 Months [] 99 Months [] Other:    Machine (LILIA!): [x] Respironics Dream Station      Auto [] ResMed AirSense     Auto [] Other:     [x]  CPAP () [] Bilevel ()   Mode: [x] Auto [] Spontaneous    Mode: [] Auto [] Spontaneous        Pressure Min: 14 cmH2O  Pressure Max: 20 cmH2O                               Comfort Settings:   - Ramp Pressure: 7 cmH2O                                        - Ramp time: 15 min                                     -  Flex/EPR - 3 full time                                    - For ResMed Bilevel (TiMax-4 sec   TiMin- 0.2 sec)     Humidifier: [x] Heated ()        [x] Water chamber replacement ()/ 1 per 6 months        Mask:   [x] Nasal () /1 per 3 months [] Full Face () /1 per 3 months   [x] Patient choice -Size and fit mask [] Patient Choice - Size and fit mask   [] Dispense:  [] Dispense:    [x] Headgear () / 1 per 3 months [] Headgear () / 1 per 3 months   [x] Replacement Nasal Cushion ()/2 per month [] Interface Replacement ()/1 per month   [] Replacement Nasal Pillows ()/2 per month         Tubing: [x] Heated ()/1 per 3 months    [] Standard ()/1 per 3 months [] Other:           Filters: [x] Non-disposable ()/1 per 6 months     [x] Ultra-Fine, Disposable ()/2 per month        Miscellaneous: [] Chin Strap ()/ 1 per 6 months [] O2 bleed-in:       LPM   [] Oximetry on CPAP/Bilevel []  Other:    [x] Modem: ()         Start Order Date: 09/10/20    MEDICAL JUSTIFICATION:  I, the undersigned, certify that the above prescribed supplies are medically necessary for this patients wellbeing. In my opinion, the supplies are both reasonable and necessary in reference to accepted standards of medicalpractice in treatment of this patients condition.     KATHRYN Shaffer - CNP      NPI: [id number]       Order Signed Date: 09/10/20    Electronically signed by KATHRYN Wen CNP on 9/10/2020 at 8:28 AM

## 2021-01-23 NOTE — TELEPHONE ENCOUNTER
Medication:   Requested Prescriptions     Pending Prescriptions Disp Refills    atorvastatin (LIPITOR) 20 MG tablet [Pharmacy Med Name: ATORVASTATIN 20 MG TABLET] 90 tablet 0     Sig: TAKE ONE TABLET BY MOUTH DAILY       Last Filled:      Patient Phone Number: [phone] (home)     Last appt: 8/25/2020   Next appt: Visit date not found    Last Lipid:   Lab Results   Component Value Date    CHOL 124 08/12/2020    TRIG 54 08/12/2020    HDL 49 08/12/2020    [address] 64 08/12/2020       Last OARRS: No flowsheet data found.     Preferred Pharmacy:   [facility] [address] [facility], [address] [place] [address] [address] [place] [address] Road  Phone: [phone] Fax: [phone]

## 2021-01-25 ENCOUNTER — TELEPHONE (OUTPATIENT)
Dept: PRIMARY CARE CLINIC | Age: 71
End: 2021-01-25

## 2021-01-25 RX ORDER — ATORVASTATIN CALCIUM 20 MG/1
TABLET, FILM COATED ORAL
Qty: 90 TABLET | Refills: 0 | Status: SHIPPED | OUTPATIENT
Start: 2021-01-25 | End: 2021-04-27

## 2021-01-25 NOTE — TELEPHONE ENCOUNTER
Medication:   Requested Prescriptions      No prescriptions requested or ordered in this encounter       Last appt: 8/25/2020   Next appt: Visit date not found    Last Lipid:   Lab Results   Component Value Date    CHOL 124 08/12/2020    TRIG 54 08/12/2020    HDL 49 08/12/2020    [address] 64 08/12/2020

## 2021-03-12 RX ORDER — LISINOPRIL 10 MG/1
TABLET ORAL
Qty: 90 TABLET | Refills: 0 | Status: SHIPPED | OUTPATIENT
Start: 2021-03-12 | End: 2021-04-27

## 2021-04-27 RX ORDER — ATORVASTATIN CALCIUM 20 MG/1
TABLET, FILM COATED ORAL
Qty: 90 TABLET | Refills: 0 | Status: SHIPPED | OUTPATIENT
Start: 2021-04-27 | End: 2021-07-27

## 2021-04-27 RX ORDER — LISINOPRIL 10 MG/1
TABLET ORAL
Qty: 90 TABLET | Refills: 0 | Status: SHIPPED | OUTPATIENT
Start: 2021-04-27 | End: 2021-06-14

## 2021-04-27 NOTE — TELEPHONE ENCOUNTER
Medication:   Requested Prescriptions     Pending Prescriptions Disp Refills    lisinopril (PRINIVIL;ZESTRIL) 10 MG tablet [Pharmacy Med Name: LISINOPRIL 10 MG TABLET] 90 tablet 0     Sig: TAKE ONE TABLET BY MOUTH TWICE A DAY    atorvastatin (LIPITOR) 20 MG tablet [Pharmacy Med Name: ATORVASTATIN 20 MG TABLET] 90 tablet 0     Sig: TAKE ONE TABLET BY MOUTH DAILY     Last Filled:  3.12.21 1.25.21    Last appt: 8/25/2020   Next appt: Visit date not found    Last Lipid:   Lab Results   Component Value Date    CHOL 124 08/12/2020    TRIG 54 08/12/2020    HDL 49 08/12/2020    LDLCALC 64 08/12/2020

## 2021-07-21 ENCOUNTER — VIRTUAL VISIT (OUTPATIENT)
Dept: PULMONOLOGY | Age: 71
End: 2021-07-21
Payer: MEDICARE

## 2021-07-21 DIAGNOSIS — E66.09 CLASS 1 OBESITY DUE TO EXCESS CALORIES WITHOUT SERIOUS COMORBIDITY WITH BODY MASS INDEX (BMI) OF 32.0 TO 32.9 IN ADULT: ICD-10-CM

## 2021-07-21 DIAGNOSIS — J30.1 ALLERGIC RHINITIS DUE TO POLLEN, UNSPECIFIED SEASONALITY: Chronic | ICD-10-CM

## 2021-07-21 DIAGNOSIS — G47.33 OBSTRUCTIVE SLEEP APNEA: Primary | Chronic | ICD-10-CM

## 2021-07-21 DIAGNOSIS — I10 ESSENTIAL HYPERTENSION: Chronic | ICD-10-CM

## 2021-07-21 PROCEDURE — 4040F PNEUMOC VAC/ADMIN/RCVD: CPT | Performed by: NURSE PRACTITIONER

## 2021-07-21 PROCEDURE — 1123F ACP DISCUSS/DSCN MKR DOCD: CPT | Performed by: NURSE PRACTITIONER

## 2021-07-21 PROCEDURE — 99214 OFFICE O/P EST MOD 30 MIN: CPT | Performed by: NURSE PRACTITIONER

## 2021-07-21 PROCEDURE — G8427 DOCREV CUR MEDS BY ELIG CLIN: HCPCS | Performed by: NURSE PRACTITIONER

## 2021-07-21 PROCEDURE — 3017F COLORECTAL CA SCREEN DOC REV: CPT | Performed by: NURSE PRACTITIONER

## 2021-07-21 ASSESSMENT — SLEEP AND FATIGUE QUESTIONNAIRES
HOW LIKELY ARE YOU TO NOD OFF OR FALL ASLEEP WHILE WATCHING TV: 1
HOW LIKELY ARE YOU TO NOD OFF OR FALL ASLEEP WHILE SITTING QUIETLY AFTER LUNCH WITHOUT ALCOHOL: 0
HOW LIKELY ARE YOU TO NOD OFF OR FALL ASLEEP WHILE SITTING AND READING: 1
HOW LIKELY ARE YOU TO NOD OFF OR FALL ASLEEP WHILE SITTING AND TALKING TO SOMEONE: 0
HOW LIKELY ARE YOU TO NOD OFF OR FALL ASLEEP WHEN YOU ARE A PASSENGER IN A CAR FOR AN HOUR WITHOUT A BREAK: 0
HOW LIKELY ARE YOU TO NOD OFF OR FALL ASLEEP WHILE LYING DOWN TO REST IN THE AFTERNOON WHEN CIRCUMSTANCES PERMIT: 0
HOW LIKELY ARE YOU TO NOD OFF OR FALL ASLEEP WHILE SITTING INACTIVE IN A PUBLIC PLACE: 0
ESS TOTAL SCORE: 2
HOW LIKELY ARE YOU TO NOD OFF OR FALL ASLEEP IN A CAR, WHILE STOPPED FOR A FEW MINUTES IN TRAFFIC: 0

## 2021-07-21 NOTE — PROGRESS NOTES
Stan Acosta MD, FAASM, [facility]  Esha Guerrero, MSN, RN, [address] [address]  Dept: [phone]  Dept Fax: [phone]  Loc: [phone]    Subjective:     Patient ID: Elissa Humphries is a 80 y.o. male. Chief Complaint   Patient presents with    Sleep Apnea       HPI:      Sleep Medicine Video Visit    Pursuant to the emergency declaration under the [address] [place] [address] waiver authority and the [employer] Act this Telephone Visit was insisted, with patient's consent, to reduce the patient's risk of exposure to COVID-19 and provide continuity of care for an established patient. Services were provided through a synchronous discussion over a telephone and/or Video chat to substitute for in-person clinic visit, and coded as such. While patient is at home.     Machine Modem/Download Info:  Compliance (hours/night): 6.8 hrs/night  Download AHI (/hour): 6.4 /HR  Average CPAP Pressure : 15.5 cmH2O           APAP - Settings  Pressure Min: 14 cmH2O  Pressure Max: 20 cmH2O                 Comfort Settings  Humidity Level (0-8): 4  Flex/EPR (0-3): 3 PAP Mask  Mask Type: Nasal mask  Clinically Relevant Leak: No     [place] - Total score: 2    Follow-up :     Last Visit : July 2020      Subjective Health Changes: none      Over Night Oximetry: [] Yes  [] No  [x] NA [] WNL   Using O2: [] Yes  [] No  [x] NA   Patient is compliant with the machine  [x] Yes  [] No [] Per patient   Feeling rested when using the machine   [x] Yes  [] No     Pressure is comfortable with inspiration and expiration  [x] Yes  [] No   ([x] NA   [] Feeling of suffocation  [] Feeling like not enough air    [] To much pressure)     Noticed changes in pressure  [x] NA  [] Yes    [] No     Mask is fitting well  [x] Yes  [] No   Noting Mask Air Leak  [] Yes  [x] No Having painful Aerophagia  [] Yes  [x] No   Nocturia   1-2  per night. Having  HA upon waking  [] Yes  [x] No   Dry mouth upon waking   Dry Nose  Dry Eyes  [x] Yes  [] No   Congestion upon waking   [] Yes  [x] No    Nose Bleeds  [] Yes  [x] No   Using Sleep Aides  [x] NA  [] OTC  [] Per our office   [] Per another provider   Understands how to change humidification and/or tubing temperature for comfort while at home  [x] Yes  [] No     Difficulties falling asleep  [] Yes  [x] No   Difficulties staying asleep  [] Yes  [x] No   Approximate time to bed  9:30-10pm   Approximate wake time  5:30am   Taking Naps  no   If taking naps usual length  [x] NA   If taking naps using the machine [x] NA  [] Yes    [] No    [] With and With out    Drowsy when driving  [] Yes  [x] No     Does patient carry a DOT/CDL  [] Yes  [x] No     Does patient carry FAA/[employer] License   [] Yes  [x] No      Any concerns noted with the machine at this time  [] Yes  [x] No       Assessment/Plan:   1. Obstructive sleep apnea  Assessment & Plan:  After downloading data and reviewing  Reviewed compliance download with pt. Supplies and parts as needed for his machine. These are medically necessary. Continue medications per his PCP and other physicians. Limit caffeine use after 3pm.    The chronic medical conditions listed are directly related to the primary diagnosis listed above. The management of the primary diagnosis affects the secondary diagnosis and vice versa    Patient is complaint encouraged to maintain compliance to aide on controlling other stated healthcare concerns. 2. Essential hypertension  Assessment & Plan:  Chronic- stable. After speaking with patient:    Agree with current plan, and would agree to continue this plan per prescribing and managing physician.       3. Class 1 obesity due to excess calories without serious comorbidity with body mass index (BMI) of 32.0 to 32.9 in adult  Assessment & Plan:  Patient encouraged to work on maintaining a healthy weight per height. Achievable with diet restriction/modifications and exercise (may consult primary care if unsure of any restrictions or concerns). Weight management directly correlates to risk of control and maintenance of Obstructive Sleep Apnea. 4. Allergic rhinitis due to pollen, unspecified seasonality  Assessment & Plan:  Chronic- stable. After speaking with patient:    Agree with current plan, and would agree to continue this plan per prescribing and managing physician. - After pulling data and reviewing it   - Reviewed compliance download with patient    -Medically necessary supplies and parts as needed for his machine.   - Continue medications per his primary care provider and other physicians.   - Encouraged to limit caffeine use after 3pm.    - Encouraged him to work on weight loss through diet and exercise  - Educated not to drive when feeling sleepy   - Patient using [employer]  - Pressure changes per prescription: Increase Pmin 15  Unplugging the machine to force over pressure changes  Office locations  Compliance  Follow up  Monitoring AHI  After speaking to the patient, patient is currently stable. We will continue with the current machine settings  Recall Information and DME contact     The chronic medical conditions listed are directly related to the primary diagnosis listed above. The management of the primary diagnosis affects the secondary diagnosis and vice versa.     - Will follow up in off in 12 months    Electronically signed by  Delmar Seaman, SHILPA, RN, CNP on 7/21/2021 at 9:21 AM

## 2021-07-21 NOTE — PROGRESS NOTES
Narcisa Laura         : 1950    Diagnosis: [x] ISAURO (G47.33) [] CSA (G47.31) [] Apnea (G47.30)   Length of Need: [x] 12 Months [] 99 Months [] Other:    Machine (LILIA!): [] Respironics Dream Station   2   Auto [] ResMed AirSense     Auto [] Other:     []  CPAP () [] Bilevel ()   Mode: [] Auto [] Spontaneous    Mode: [] Auto [] Spontaneous                       Comfort Settings:   - Ramp Pressure:  cmH2O                                        - Ramp time: 15 min                                     -  Flex/EPR - 3 full time                                    - For ResMed Bilevel (TiMax-4 sec   TiMin- 0.2 sec)     Humidifier: [x] Heated ()        [x] Water chamber replacement ()/ 1 per 6 months        Mask:   [x] Nasal () /1 per 3 months [] Full Face () /1 per 3 months   [x] Patient choice -Size and fit mask [] Patient Choice - Size and fit mask   [] Dispense:  [] Dispense:    [x] Headgear () / 1 per 3 months [] Headgear () / 1 per 3 months   [x] Replacement Nasal Cushion ()/2 per month [] Interface Replacement ()/1 per month   [] Replacement Nasal Pillows ()/2 per month         Tubing: [x] Heated ()/1 per 3 months    [] Standard ()/1 per 3 months [] Other:           Filters: [x] Non-disposable ()/1 per 6 months     [x] Ultra-Fine, Disposable ()/2 per month        Miscellaneous: [] Chin Strap ()/ 1 per 6 months [] O2 bleed-in:       LPM   [] Oximetry on CPAP/Bilevel []  Other:    [x] Modem: ()         Start Order Date: 21    MEDICAL JUSTIFICATION:  I, the undersigned, certify that the above prescribed supplies are medically necessary for this patients wellbeing. In my opinion, the supplies are both reasonable and necessary in reference to accepted standards of medicalpractice in treatment of this patients condition.     KATHRYN Martin CNP      NPI: [id number]       Order Signed Date: 21    Electronically signed by KATHRYN Chawla CNP on 2021 at [address] [place] [address]  1950  [address] [address] [phone] (home)   [phone] (mobile)      Insurance Info (confirm with patient if correct):  Payor/Plan Subscr  Sex Relation Sub.  Ins. ID Effective Group Num

## 2021-07-27 RX ORDER — ATORVASTATIN CALCIUM 20 MG/1
TABLET, FILM COATED ORAL
Qty: 90 TABLET | Refills: 0 | Status: SHIPPED | OUTPATIENT
Start: 2021-07-27 | End: 2021-10-25

## 2021-07-27 NOTE — TELEPHONE ENCOUNTER
Medication:   Requested Prescriptions     Pending Prescriptions Disp Refills    atorvastatin (LIPITOR) 20 MG tablet [Pharmacy Med Name: ATORVASTATIN 20 MG TABLET] 90 tablet 0     Sig: TAKE ONE TABLET BY MOUTH DAILY     Last Filled:  04/27/21    Last appt: 8/25/2020   Next appt:08/09/21    Last Lipid:   Lab Results   Component Value Date    CHOL 124 08/12/2020    TRIG 54 08/12/2020    HDL 49 08/12/2020    LDLCALC 64 08/12/2020

## 2021-08-02 NOTE — TELEPHONE ENCOUNTER
Medication:   Requested Prescriptions     Pending Prescriptions Disp Refills    lisinopril (PRINIVIL;ZESTRIL) 10 MG tablet [Pharmacy Med Name: LISINOPRIL 10 MG TABLET] 90 tablet 0     Sig: TAKE ONE TABLET BY MOUTH TWICE A DAY     Last Filled:  6/14/21    Last appt: 8/25/2020   Next appt: 8/9/2021    Last Lipid:   Lab Results   Component Value Date    CHOL 124 08/12/2020    TRIG 54 08/12/2020    HDL 49 08/12/2020    LDLCALC 64 08/12/2020     Last PSA:   Lab Results   Component Value Date    PSA 0.88 04/24/2019     Last Thyroid:   Lab Results   Component Value Date    TSH 1.41 04/24/2019

## 2021-08-03 RX ORDER — LISINOPRIL 10 MG/1
TABLET ORAL
Qty: 90 TABLET | Refills: 0 | Status: SHIPPED | OUTPATIENT
Start: 2021-08-03 | End: 2021-09-16

## 2021-08-09 ENCOUNTER — OFFICE VISIT (OUTPATIENT)
Dept: PRIMARY CARE CLINIC | Age: 71
End: 2021-08-09
Payer: MEDICARE

## 2021-08-09 VITALS
TEMPERATURE: 98.1 F | HEART RATE: 73 BPM | HEIGHT: 74 IN | OXYGEN SATURATION: 95 % | DIASTOLIC BLOOD PRESSURE: 88 MMHG | SYSTOLIC BLOOD PRESSURE: 132 MMHG | BODY MASS INDEX: 34.68 KG/M2 | WEIGHT: 270.2 LBS

## 2021-08-09 DIAGNOSIS — G47.33 OBSTRUCTIVE SLEEP APNEA: ICD-10-CM

## 2021-08-09 DIAGNOSIS — L30.9 DERMATITIS: ICD-10-CM

## 2021-08-09 DIAGNOSIS — E78.00 HYPERCHOLESTEROLEMIA: ICD-10-CM

## 2021-08-09 DIAGNOSIS — I10 ESSENTIAL HYPERTENSION: Primary | ICD-10-CM

## 2021-08-09 PROCEDURE — 99214 OFFICE O/P EST MOD 30 MIN: CPT | Performed by: FAMILY MEDICINE

## 2021-08-09 PROCEDURE — G8427 DOCREV CUR MEDS BY ELIG CLIN: HCPCS | Performed by: FAMILY MEDICINE

## 2021-08-09 PROCEDURE — G8417 CALC BMI ABV UP PARAM F/U: HCPCS | Performed by: FAMILY MEDICINE

## 2021-08-09 PROCEDURE — 1123F ACP DISCUSS/DSCN MKR DOCD: CPT | Performed by: FAMILY MEDICINE

## 2021-08-09 PROCEDURE — 4040F PNEUMOC VAC/ADMIN/RCVD: CPT | Performed by: FAMILY MEDICINE

## 2021-08-09 PROCEDURE — 3017F COLORECTAL CA SCREEN DOC REV: CPT | Performed by: FAMILY MEDICINE

## 2021-08-09 PROCEDURE — 1036F TOBACCO NON-USER: CPT | Performed by: FAMILY MEDICINE

## 2021-08-09 RX ORDER — MOMETASONE FUROATE 1 MG/G
CREAM TOPICAL
Qty: 50 G | Refills: 0 | Status: SHIPPED | OUTPATIENT
Start: 2021-08-09

## 2021-08-09 SDOH — HEALTH STABILITY: MENTAL HEALTH
STRESS IS WHEN SOMEONE FEELS TENSE, NERVOUS, ANXIOUS, OR CAN'T SLEEP AT NIGHT BECAUSE THEIR MIND IS TROUBLED. HOW STRESSED ARE YOU?: ONLY A LITTLE

## 2021-08-09 SDOH — HEALTH STABILITY: PHYSICAL HEALTH: ON AVERAGE, HOW MANY MINUTES DO YOU ENGAGE IN EXERCISE AT THIS LEVEL?: 60 MIN

## 2021-08-09 SDOH — ECONOMIC STABILITY: HOUSING INSECURITY: IN THE LAST 12 MONTHS, HOW MANY PLACES HAVE YOU LIVED?: 1

## 2021-08-09 SDOH — ECONOMIC STABILITY: FOOD INSECURITY: WITHIN THE PAST 12 MONTHS, THE FOOD YOU BOUGHT JUST DIDN'T LAST AND YOU DIDN'T HAVE MONEY TO GET MORE.: NEVER TRUE

## 2021-08-09 SDOH — ECONOMIC STABILITY: HOUSING INSECURITY
IN THE LAST 12 MONTHS, WAS THERE A TIME WHEN YOU DID NOT HAVE A STEADY PLACE TO SLEEP OR SLEPT IN A SHELTER (INCLUDING NOW)?: NO

## 2021-08-09 SDOH — ECONOMIC STABILITY: TRANSPORTATION INSECURITY
IN THE PAST 12 MONTHS, HAS LACK OF TRANSPORTATION KEPT YOU FROM MEETINGS, WORK, OR FROM GETTING THINGS NEEDED FOR DAILY LIVING?: NO

## 2021-08-09 SDOH — SOCIAL STABILITY: SOCIAL NETWORK
IN A TYPICAL WEEK, HOW MANY TIMES DO YOU TALK ON THE PHONE WITH FAMILY, FRIENDS, OR NEIGHBORS?: MORE THAN THREE TIMES A WEEK

## 2021-08-09 SDOH — ECONOMIC STABILITY: FOOD INSECURITY: WITHIN THE PAST 12 MONTHS, YOU WORRIED THAT YOUR FOOD WOULD RUN OUT BEFORE YOU GOT MONEY TO BUY MORE.: NEVER TRUE

## 2021-08-09 SDOH — ECONOMIC STABILITY: TRANSPORTATION INSECURITY
IN THE PAST 12 MONTHS, HAS THE LACK OF TRANSPORTATION KEPT YOU FROM MEDICAL APPOINTMENTS OR FROM GETTING MEDICATIONS?: NO

## 2021-08-09 SDOH — HEALTH STABILITY: MENTAL HEALTH: HOW OFTEN DO YOU HAVE A DRINK CONTAINING ALCOHOL?: MONTHLY OR LESS

## 2021-08-09 SDOH — SOCIAL STABILITY: SOCIAL NETWORK: HOW OFTEN DO YOU GET TOGETHER WITH FRIENDS OR RELATIVES?: MORE THAN THREE TIMES A WEEK

## 2021-08-09 SDOH — SOCIAL STABILITY: SOCIAL NETWORK: HOW OFTEN DO YOU ATTEND CHURCH OR RELIGIOUS SERVICES?: MORE THAN 4 TIMES PER YEAR

## 2021-08-09 SDOH — ECONOMIC STABILITY: INCOME INSECURITY: IN THE LAST 12 MONTHS, WAS THERE A TIME WHEN YOU WERE NOT ABLE TO PAY THE MORTGAGE OR RENT ON TIME?: NO

## 2021-08-09 SDOH — HEALTH STABILITY: MENTAL HEALTH: HOW MANY STANDARD DRINKS CONTAINING ALCOHOL DO YOU HAVE ON A TYPICAL DAY?: 1 OR 2

## 2021-08-09 SDOH — SOCIAL STABILITY: SOCIAL NETWORK: ARE YOU MARRIED, WIDOWED, DIVORCED, SEPARATED, NEVER MARRIED, OR LIVING WITH A PARTNER?: MARRIED

## 2021-08-09 SDOH — SOCIAL STABILITY: SOCIAL NETWORK
DO YOU BELONG TO ANY CLUBS OR ORGANIZATIONS SUCH AS CHURCH GROUPS UNIONS, FRATERNAL OR ATHLETIC GROUPS, OR SCHOOL GROUPS?: NO

## 2021-08-09 SDOH — HEALTH STABILITY: PHYSICAL HEALTH: ON AVERAGE, HOW MANY DAYS PER WEEK DO YOU ENGAGE IN MODERATE TO STRENUOUS EXERCISE (LIKE A BRISK WALK)?: 3 DAYS

## 2021-08-09 SDOH — ECONOMIC STABILITY: INCOME INSECURITY: HOW HARD IS IT FOR YOU TO PAY FOR THE VERY BASICS LIKE FOOD, HOUSING, MEDICAL CARE, AND HEATING?: NOT HARD AT ALL

## 2021-08-09 SDOH — SOCIAL STABILITY: SOCIAL NETWORK: HOW OFTEN DO YOU ATTENT MEETINGS OF THE CLUB OR ORGANIZATION YOU BELONG TO?: NEVER

## 2021-08-09 ASSESSMENT — PATIENT HEALTH QUESTIONNAIRE - PHQ9
SUM OF ALL RESPONSES TO PHQ QUESTIONS 1-9: 0
SUM OF ALL RESPONSES TO PHQ9 QUESTIONS 1 & 2: 0
SUM OF ALL RESPONSES TO PHQ QUESTIONS 1-9: 0
1. LITTLE INTEREST OR PLEASURE IN DOING THINGS: 0
SUM OF ALL RESPONSES TO PHQ QUESTIONS 1-9: 0
2. FEELING DOWN, DEPRESSED OR HOPELESS: 0

## 2021-08-09 ASSESSMENT — SOCIAL DETERMINANTS OF HEALTH (SDOH): HOW HARD IS IT FOR YOU TO PAY FOR THE VERY BASICS LIKE FOOD, HOUSING, MEDICAL CARE, AND HEATING?: NOT HARD AT ALL

## 2021-08-09 NOTE — PROGRESS NOTES
Subjective:      Patient ID: Kg Velázquez is a 80 y.o. male. HPI  Hypertension: Patient here for follow-up of elevated blood pressure. He is exercising and is adherent to low salt diet. Blood pressure is well controlled at home. Cardiac symptoms none. Patient denies chest pain, chest pressure/discomfort, claudication, dyspnea, exertional chest pressure/discomfort, fatigue, irregular heart beat, lower extremity edema, near-syncope, orthopnea, palpitations and paroxysmal nocturnal dyspnea. Cardiovascular risk factors: dyslipidemia, family history of premature cardiovascular disease, male gender and obesity (BMI >= 30 kg/m2). Use of agents associated with hypertension: none. History of target organ damage: none. Abnormal blood pressure reading today in the office he feels very well when he had a few pounds extra he wanted to wait and exercise and lose some of the weight before adjust his medication his blood pressure reading usually well controlled at home. Pt with history of sleep apnea doing well on CPAP  History of hyperlipidemia on med's doing well   Patient had some occasional irritation in his skin neck tried Elocon in the past did not really help him he wants refill on it  Review of Systems   Constitutional: Negative. HENT: Negative. All other systems reviewed and are negative.     Past Medical History:   Diagnosis Date    Colon polyps 7.26.06    x2    Hypertension     resolved with weight loss    Malaria 2011    [facility]    Obstructive sleep apnea     CPAP user    Obstructive sleep apnea (adult) (pediatric)       Past Surgical History:   Procedure Laterality Date    CYST REMOVAL  1997    back x 2    LASIK  2001    SHOULDER ARTHROSCOPY      SHOULDER SURGERY Right 1/27/2020    RIGHT SHOULDER DIAGNOSTIC ARTHROSCOPY, EUA, MALENA, REVISION SUBACROMIAL DECOMPRESSION,  EXTENSIVE DEBRIDEMENT, REVISION ROTATOR CUFF REPAIR X 2, WITH COLLOGEN GRAFT AUGMENTATION performed by Buckley Ormond, MD at [facility] TYMPANOSTOMY TUBE PLACEMENT  2002     Family History   Problem Relation Age of Onset    Heart Attack Father 76        smoker    Sleep Apnea Brother     Sleep Apnea Brother     Crohn's Disease Brother         healthy    Heart Attack Brother 50     Social History     Socioeconomic History    Marital status:      Spouse name: Harleen Richmond Number of children: 3    Years of education: None    Highest education level: None   Occupational History    Occupation: retired     Comment: Missionary   Tobacco Use    Smoking status: Never Smoker    Smokeless tobacco: Never Used   Vaping Use    Vaping Use: Never used   Substance and Sexual Activity    Alcohol use: Yes     Alcohol/week: 0.0 standard drinks     Types: 3 Glasses of wine per week     Comment: rare    Drug use: No    Sexual activity: Yes     Partners: Female     Comment: M ,4 kids 27 I,40,91,54 yo   Other Topics Concern    None   Social History Narrative    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[employer]     4 children     9 G children      Social Determinants of Health     Financial Resource Strain: Low Risk     Difficulty of Paying Living Expenses: Not hard at all   Food Insecurity: No Food Insecurity    Worried About [address] in the Last Year: Never true    [facility] in the Last Year: Never true   Transportation Needs: No Transportation Needs    Lack of Transportation (Medical): No    Lack of Transportation (Non-Medical): No   Physical Activity: Sufficiently Active    Days of Exercise per Week: 3 days    Minutes of Exercise per Session: 60 min   Stress: No Stress Concern Present    Feeling of Stress : Only a little   Social Connections:  Moderately Integrated    Frequency of Communication with Friends and Family: More than three times a week    Frequency of Social Gatherings with Friends and Family: More than three times a week    Attends Rastafari Services: More than 4 times per year    Active Member of Clubs or Organizations: No    Attends Club or Organization Meetings: Never    Marital Status:    Intimate Partner Violence:     Fear of Current or Ex-Partner:     Emotionally Abused:     Physically Abused:     Sexually Abused:      Current Outpatient Medications   Medication Sig Dispense Refill    lisinopril (PRINIVIL;ZESTRIL) 10 MG tablet TAKE ONE TABLET BY MOUTH TWICE A DAY 90 tablet 0    atorvastatin (LIPITOR) 20 MG tablet TAKE ONE TABLET BY MOUTH DAILY 90 tablet 0    aspirin 81 MG chewable tablet Take 81 mg by mouth daily      mometasone (ELOCON) 0.1 % cream Apply topically daily. 50 g 0    Cetirizine HCl (ZYRTEC ALLERGY PO) Take by mouth       No current facility-administered medications for this visit. No Known Allergies      Objective:   Physical Exam   Vitals reviewed. Constitutional: He is oriented to person, place, and time. He appears well-developed and well-nourished. HENT:   Mouth/Throat: Oropharynx is clear and moist.   Eyes: Conjunctivae are normal. No scleral icterus. Neck: Normal range of motion. Neck supple. No JVD present. Carotid bruit is not present. No thyromegaly present. Cardiovascular: Normal rate, regular rhythm, normal heart sounds and intact distal pulses. No murmur heard. Pulmonary/Chest: Effort normal and breath sounds normal. No respiratory distress. He has no wheezes. He has no rales. He exhibits no tenderness. Abdominal: Soft. Bowel sounds are normal. He exhibits no distension and no mass. There is no tenderness. There is no rebound and no guarding. Musculoskeletal: Normal range of motion. He exhibits no edema and  tenderness. at the left shoulder antonietta with internal rotation, no swelling muscle strength is 5/5   Neurological: He is alert and oriented to person, place, and time. Skin: No rash noted. Assessment:         Diagnosis Orders   1. Essential hypertension  BASIC METABOLIC PANEL    CBC    HEPATIC FUNCTION PANEL    LIPID PANEL   2. Hypercholesterolemia     3. Obstructive sleep apnea     4.  Dermatitis  mometasone (ELOCON) 0.1 % cream           Plan:      See orders, discussed healthier life style  Discussed healthier lifestyle portion control/low sodium intake and monitor blood

## 2021-09-15 NOTE — TELEPHONE ENCOUNTER
Medication:   Requested Prescriptions     Pending Prescriptions Disp Refills    lisinopril (PRINIVIL;ZESTRIL) 10 MG tablet [Pharmacy Med Name: LISINOPRIL 10 MG TABLET] 90 tablet 0     Sig: TAKE ONE TABLET BY MOUTH TWICE A DAY       Last Filled:      Patient Phone Number: [phone] (home)     Last appt: 8/9/2021   Next appt: Visit date not found    Last BMP:   Lab Results   Component Value Date     08/12/2021    K 4.9 08/12/2021     08/12/2021    CO2 26 08/12/2021    ANIONGAP 11 08/12/2021    GLUCOSE 100 08/12/2021    BUN 17 08/12/2021    CREATININE 1.0 08/12/2021    LABGLOM >60 08/12/2021    LABGLOM 84 04/28/2014    GFRAA >60 08/12/2021    CALCIUM 9.5 08/12/2021      Last CMP:   Lab Results   Component Value Date     08/12/2021    K 4.9 08/12/2021     08/12/2021    CO2 26 08/12/2021    ANIONGAP 11 08/12/2021    GLUCOSE 100 08/12/2021    BUN 17 08/12/2021    CREATININE 1.0 08/12/2021    LABGLOM >60 08/12/2021    LABGLOM 84 04/28/2014    GFRAA >60 08/12/2021    PROT 6.5 08/12/2021    LABALBU 4.2 08/12/2021    AGRATIO 1.7 03/16/2015    BILITOT 0.8 08/12/2021    ALKPHOS 44 08/12/2021    ALT 6 08/12/2021    AST 14 08/12/2021    GLOB 2.4 03/16/2015     Last Renal Function:   Lab Results   Component Value Date     08/12/2021    K 4.9 08/12/2021     08/12/2021    CO2 26 08/12/2021    GLUCOSE 100 08/12/2021    BUN 17 08/12/2021    CREATININE 1.0 08/12/2021    PHOS 2.8 05/02/2011    LABALBU 4.2 08/12/2021    CALCIUM 9.5 08/12/2021    GFRAA >60 08/12/2021       Last OARRS: No flowsheet data found.     Preferred Pharmacy:   [facility], [address] [place] [address] [address] [facility]  Phone: [phone] Fax: [phone]

## 2021-09-16 RX ORDER — LISINOPRIL 10 MG/1
TABLET ORAL
Qty: 90 TABLET | Refills: 0 | Status: SHIPPED | OUTPATIENT
Start: 2021-09-16 | End: 2021-11-04

## 2021-10-25 RX ORDER — ATORVASTATIN CALCIUM 20 MG/1
TABLET, FILM COATED ORAL
Qty: 90 TABLET | Refills: 0 | Status: SHIPPED | OUTPATIENT
Start: 2021-10-25 | End: 2022-01-28 | Stop reason: SDUPTHER

## 2021-10-25 NOTE — TELEPHONE ENCOUNTER
Medication:   Requested Prescriptions     Pending Prescriptions Disp Refills    atorvastatin (LIPITOR) 20 MG tablet [Pharmacy Med Name: ATORVASTATIN 20 MG TABLET] 90 tablet 0     Sig: TAKE ONE TABLET BY MOUTH DAILY     Last Filled:  7/27/21    Last appt: 8/9/2021   Next appt: Visit date not found    Last Lipid:   Lab Results   Component Value Date    CHOL 138 08/12/2021    TRIG 75 08/12/2021    HDL 58 08/12/2021    LDLCALC 65 08/12/2021

## 2021-11-03 NOTE — TELEPHONE ENCOUNTER
Medication:   Requested Prescriptions     Pending Prescriptions Disp Refills    lisinopril (PRINIVIL;ZESTRIL) 10 MG tablet [Pharmacy Med Name: LISINOPRIL 10 MG TABLET] 90 tablet 0     Sig: TAKE ONE TABLET BY MOUTH TWICE A DAY       Last appt: 8/9/2021   Next appt: Visit date not found    Last OARRS: No flowsheet data found.

## 2021-11-04 RX ORDER — LISINOPRIL 10 MG/1
TABLET ORAL
Qty: 90 TABLET | Refills: 0 | Status: SHIPPED | OUTPATIENT
Start: 2021-11-04 | End: 2021-12-16

## 2021-12-16 RX ORDER — LISINOPRIL 10 MG/1
TABLET ORAL
Qty: 90 TABLET | Refills: 0 | Status: SHIPPED | OUTPATIENT
Start: 2021-12-16 | End: 2022-02-01 | Stop reason: SDUPTHER

## 2021-12-16 NOTE — TELEPHONE ENCOUNTER
Medication:   Requested Prescriptions     Pending Prescriptions Disp Refills    lisinopril (PRINIVIL;ZESTRIL) 10 MG tablet [Pharmacy Med Name: LISINOPRIL 10 MG TABLET] 90 tablet 0     Sig: TAKE ONE TABLET BY MOUTH TWICE A DAY     Last Filled:  11/04/21    Last appt: 8/9/2021   Next appt: Visit date not found    Last OARRS: No flowsheet data found.

## 2022-01-28 RX ORDER — ATORVASTATIN CALCIUM 20 MG/1
TABLET, FILM COATED ORAL
Qty: 90 TABLET | Refills: 0 | Status: SHIPPED | OUTPATIENT
Start: 2022-01-28 | End: 2022-04-29 | Stop reason: SDUPTHER

## 2022-01-28 NOTE — TELEPHONE ENCOUNTER
Pt is out of Atorvastatin. He says the pharmacy sent us a request last week. There is nothing in the system. Please send a script for Atorvastatin 20 mg. Pt takes one tablet by mouth daily.     Send to   [address] [place] [address] Drive [address] [facility], [address] [place] [address] [address] [place] [address] [address] [address]   Phone:  [phone]  Fax:  [phone]         LOV 8/9/21

## 2022-01-28 NOTE — TELEPHONE ENCOUNTER
Medication:   Requested Prescriptions     Pending Prescriptions Disp Refills    atorvastatin (LIPITOR) 20 MG tablet 90 tablet 0     Sig: TAKE ONE TABLET BY MOUTH DAILY     Last Filled:  10/25/21    Last appt: 8/9/2021   Next appt: Visit date not found    Last Lipid:   Lab Results   Component Value Date    CHOL 138 08/12/2021    TRIG 75 08/12/2021    HDL 58 08/12/2021    LDLCALC 65 08/12/2021

## 2022-03-28 RX ORDER — LISINOPRIL 10 MG/1
TABLET ORAL
Qty: 90 TABLET | Refills: 0 | Status: SHIPPED | OUTPATIENT
Start: 2022-03-28 | End: 2022-05-12 | Stop reason: SDUPTHER

## 2022-03-28 NOTE — TELEPHONE ENCOUNTER
Medication:   Requested Prescriptions     Pending Prescriptions Disp Refills    lisinopril (PRINIVIL;ZESTRIL) 10 MG tablet 90 tablet 0     Sig: TAKE ONE TABLET BY MOUTH TWICE A DAY       Last Filled:      Patient Phone Number: [phone] (home)     Last appt: 8/9/2021   Next appt: Visit date not found    Last BMP:   Lab Results   Component Value Date     08/12/2021    K 4.9 08/12/2021     08/12/2021    CO2 26 08/12/2021    ANIONGAP 11 08/12/2021    GLUCOSE 100 08/12/2021    BUN 17 08/12/2021    CREATININE 1.0 08/12/2021    LABGLOM >60 08/12/2021    LABGLOM 84 04/28/2014    GFRAA >60 08/12/2021    CALCIUM 9.5 08/12/2021      Last CMP:   Lab Results   Component Value Date     08/12/2021    K 4.9 08/12/2021     08/12/2021    CO2 26 08/12/2021    ANIONGAP 11 08/12/2021    GLUCOSE 100 08/12/2021    BUN 17 08/12/2021    CREATININE 1.0 08/12/2021    LABGLOM >60 08/12/2021    LABGLOM 84 04/28/2014    GFRAA >60 08/12/2021    PROT 6.5 08/12/2021    LABALBU 4.2 08/12/2021    AGRATIO 1.7 03/16/2015    BILITOT 0.8 08/12/2021    ALKPHOS 44 08/12/2021    ALT 6 08/12/2021    AST 14 08/12/2021    GLOB 2.4 03/16/2015     Last Renal Function:   Lab Results   Component Value Date     08/12/2021    K 4.9 08/12/2021     08/12/2021    CO2 26 08/12/2021    GLUCOSE 100 08/12/2021    BUN 17 08/12/2021    CREATININE 1.0 08/12/2021    PHOS 2.8 05/02/2011    LABALBU 4.2 08/12/2021    CALCIUM 9.5 08/12/2021    GFRAA >60 08/12/2021       Last OARRS: No flowsheet data found.     Preferred Pharmacy:   [facility] [address] [facility], [address] [place] [address] [address]  [facility] [address] [facility] [address]  Phone: [phone] Fax: [phone]

## 2022-04-29 RX ORDER — ATORVASTATIN CALCIUM 20 MG/1
TABLET, FILM COATED ORAL
Qty: 90 TABLET | Refills: 0 | Status: SHIPPED | OUTPATIENT
Start: 2022-04-29 | End: 2022-05-16 | Stop reason: SDUPTHER

## 2022-05-12 RX ORDER — LISINOPRIL 10 MG/1
TABLET ORAL
Qty: 30 TABLET | Refills: 0 | Status: SHIPPED | OUTPATIENT
Start: 2022-05-12 | End: 2022-05-16 | Stop reason: SDUPTHER

## 2022-05-12 NOTE — TELEPHONE ENCOUNTER
Medication:   Requested Prescriptions     Pending Prescriptions Disp Refills    lisinopril (PRINIVIL;ZESTRIL) 10 MG tablet 30 tablet 0     Sig: TAKE ONE TABLET BY MOUTH TWICE A DAY       Last Filled:      Patient Phone Number: [phone] (home)     Last appt: 8/9/2021   Next appt: 5/16/2022    Last BMP:   Lab Results   Component Value Date     08/12/2021    K 4.9 08/12/2021     08/12/2021    CO2 26 08/12/2021    ANIONGAP 11 08/12/2021    GLUCOSE 100 08/12/2021    BUN 17 08/12/2021    CREATININE 1.0 08/12/2021    LABGLOM >60 08/12/2021    LABGLOM 84 04/28/2014    GFRAA >60 08/12/2021    CALCIUM 9.5 08/12/2021      Last CMP:   Lab Results   Component Value Date     08/12/2021    K 4.9 08/12/2021     08/12/2021    CO2 26 08/12/2021    ANIONGAP 11 08/12/2021    GLUCOSE 100 08/12/2021    BUN 17 08/12/2021    CREATININE 1.0 08/12/2021    LABGLOM >60 08/12/2021    LABGLOM 84 04/28/2014    GFRAA >60 08/12/2021    PROT 6.5 08/12/2021    LABALBU 4.2 08/12/2021    AGRATIO 1.7 03/16/2015    BILITOT 0.8 08/12/2021    ALKPHOS 44 08/12/2021    ALT 6 08/12/2021    AST 14 08/12/2021    GLOB 2.4 03/16/2015     Last Renal Function:   Lab Results   Component Value Date     08/12/2021    K 4.9 08/12/2021     08/12/2021    CO2 26 08/12/2021    GLUCOSE 100 08/12/2021    BUN 17 08/12/2021    CREATININE 1.0 08/12/2021    PHOS 2.8 05/02/2011    LABALBU 4.2 08/12/2021    CALCIUM 9.5 08/12/2021    GFRAA >60 08/12/2021       Last OARRS: No flowsheet data found.     Preferred Pharmacy:   [facility] [id number] - [id number], [address] [facility]  Phone: [phone] Fax: [phone]

## 2022-05-12 NOTE — TELEPHONE ENCOUNTER
----- Message from Slime Quinones sent at 5/12/2022  1:10 PM EDT -----  Subject: Refill Request    QUESTIONS  Name of Medication? lisinopril (PRINIVIL;ZESTRIL) 10 MG tablet  Patient-reported dosage and instructions? 10 MG tablet 2x daily   How many days do you have left? 3  Preferred Pharmacy? [facility] [id number]  Pharmacy phone number (if available)? [phone]  Additional Information for Provider? Pt set appt 5/16/22. Is going out of   town 5/13/22 and wants to refill this RX   ---------------------------------------------------------------------------  --------------  CALL BACK INFO  What is the best way for the office to contact you? OK to leave message on   [employer], OK to respond with electronic message via [employer] portal (only   for patients who have registered [employer] account)  Preferred Call Back Phone Number? [phone]  ---------------------------------------------------------------------------  --------------  SCRIPT ANSWERS  Relationship to Patient?  Self

## 2022-05-12 NOTE — TELEPHONE ENCOUNTER
Medication:   Requested Prescriptions     Pending Prescriptions Disp Refills    lisinopril (PRINIVIL;ZESTRIL) 10 MG tablet 90 tablet 0     Sig: TAKE ONE TABLET BY MOUTH TWICE A DAY     Last Filled: 3.28.22    Last appt: 8/9/2021   Next appt: Need OV    Last OARRS: No flowsheet data found.

## 2022-05-16 ENCOUNTER — TELEMEDICINE (OUTPATIENT)
Dept: PRIMARY CARE CLINIC | Age: 72
End: 2022-05-16
Payer: MEDICARE

## 2022-05-16 DIAGNOSIS — G47.33 OBSTRUCTIVE SLEEP APNEA: ICD-10-CM

## 2022-05-16 DIAGNOSIS — E78.00 HYPERCHOLESTEROLEMIA: ICD-10-CM

## 2022-05-16 DIAGNOSIS — I10 ESSENTIAL HYPERTENSION: Primary | ICD-10-CM

## 2022-05-16 PROCEDURE — 4040F PNEUMOC VAC/ADMIN/RCVD: CPT | Performed by: FAMILY MEDICINE

## 2022-05-16 PROCEDURE — 1036F TOBACCO NON-USER: CPT | Performed by: FAMILY MEDICINE

## 2022-05-16 PROCEDURE — G8427 DOCREV CUR MEDS BY ELIG CLIN: HCPCS | Performed by: FAMILY MEDICINE

## 2022-05-16 PROCEDURE — 99212 OFFICE O/P EST SF 10 MIN: CPT | Performed by: FAMILY MEDICINE

## 2022-05-16 PROCEDURE — G8417 CALC BMI ABV UP PARAM F/U: HCPCS | Performed by: FAMILY MEDICINE

## 2022-05-16 PROCEDURE — 3017F COLORECTAL CA SCREEN DOC REV: CPT | Performed by: FAMILY MEDICINE

## 2022-05-16 PROCEDURE — 1123F ACP DISCUSS/DSCN MKR DOCD: CPT | Performed by: FAMILY MEDICINE

## 2022-05-16 RX ORDER — ATORVASTATIN CALCIUM 20 MG/1
TABLET, FILM COATED ORAL
Qty: 90 TABLET | Refills: 0 | Status: SHIPPED | OUTPATIENT
Start: 2022-05-16 | End: 2022-08-03 | Stop reason: SDUPTHER

## 2022-05-16 RX ORDER — LISINOPRIL 10 MG/1
TABLET ORAL
Qty: 90 TABLET | Refills: 1 | Status: SHIPPED | OUTPATIENT
Start: 2022-05-16 | End: 2022-07-07 | Stop reason: SDUPTHER

## 2022-05-16 ASSESSMENT — ENCOUNTER SYMPTOMS
GASTROINTESTINAL NEGATIVE: 1
RESPIRATORY NEGATIVE: 1
EYES NEGATIVE: 1

## 2022-05-16 NOTE — PROGRESS NOTES
2022    TELEHEALTH EVALUATION -- Audio/Visual (During XVJTA-20 public health emergency)    HPI:    Jamaal Ramirez (:  1950) has requested an audio/video evaluation for the following concern(s):      Hypertension: Patient here for follow-up of elevated blood pressure. He is exercising and is adherent to low salt diet. Blood pressure is well controlled at home. Cardiac symptoms none. Patient denies chest pain, chest pressure/discomfort, claudication, dyspnea, exertional chest pressure/discomfort, fatigue, irregular heart beat, lower extremity edema, near-syncope, orthopnea, palpitations and paroxysmal nocturnal dyspnea. Cardiovascular risk factors: dyslipidemia, family history of premature cardiovascular disease, male gender and obesity (BMI >= 30 kg/m2). Use of agents associated with hypertension: none. History of target organ damage: none. Abnormal blood pressure reading today in the office he feels very well when he had a few pounds extra he wanted to wait and exercise and lose some of the weight before adjust his medication his blood pressure reading usually well controlled at home. Pt with history of sleep apnea doing well on CPAP  History of hyperlipidemia on med's doing well   Is working on losing weight according to patient lost about  With 30 pound feeling feeling well  She was exposed to a friend with COVID the last weekend feels okay for now,  Review of Systems   Constitutional: Negative. HENT: Negative. Eyes: Negative. Respiratory: Negative. Cardiovascular: Negative. Gastrointestinal: Negative. All other systems reviewed and are negative. Prior to Visit Medications    Medication Sig Taking? Authorizing Provider   lisinopril (PRINIVIL;ZESTRIL) 10 MG tablet TAKE ONE TABLET BY MOUTH TWICE A DAY Yes Kishore Patrick MD   atorvastatin (LIPITOR) 20 MG tablet TAKE ONE TABLET BY MOUTH DAILY Yes Kishore Patrick MD   mometasone (ELOCON) 0.1 % cream Apply topically daily.  Yes Kishore Patrick MD   aspirin 81 MG chewable tablet Take 81 mg by mouth daily Yes Historical Provider, MD   Cetirizine HCl (ZYRTEC ALLERGY PO) Take by mouth Yes Historical Provider, MD       Social History     Tobacco Use    Smoking status: Never Smoker    Smokeless tobacco: Never Used   Vaping Use    Vaping Use: Never used   Substance Use Topics    Alcohol use: Yes     Alcohol/week: 0.0 standard drinks     Types: 3 Glasses of wine per week     Comment: rare    Drug use: No        No Known Allergies,   Past Medical History:   Diagnosis Date    Colon polyps 7.26.06    x2    Hypertension     resolved with weight loss    [facility]    Obstructive sleep apnea     CPAP user    Obstructive sleep apnea (adult) (pediatric)    ,   Past Surgical History:   Procedure Laterality Date    CYST REMOVAL  1997    back x 2    LASIK  2001    SHOULDER ARTHROSCOPY      SHOULDER SURGERY Right 1/27/2020    RIGHT SHOULDER DIAGNOSTIC ARTHROSCOPY, EUA, MALEAN, REVISION SUBACROMIAL DECOMPRESSION,  EXTENSIVE DEBRIDEMENT, REVISION ROTATOR CUFF REPAIR X 2, WITH COLLOGEN GRAFT AUGMENTATION performed by David [facility]son MD at [facility]  2002   ,   Social History     Tobacco Use    Smoking status: Never Smoker    Smokeless tobacco: Never Used   Vaping Use    Vaping Use: Never used   Substance Use Topics    Alcohol use:  Yes     Alcohol/week: 0.0 standard drinks     Types: 3 Glasses of wine per week     Comment: rare    Drug use: No   ,   Family History   Problem Relation Age of Onset    Heart Attack Father 76        smoker    Sleep Apnea Brother     Sleep Apnea Brother     Crohn's Disease Brother         healthy    Heart Attack Brother 50   ,   Immunization History   Administered Date(s) Administered    Hepatitis A 11/22/2013, 07/11/2014    Pneumococcal Conjugate 13-valent ([id number]) 03/29/2018    Pneumococcal Polysaccharide ([id number]) 04/23/2019    Tdap (Boostrix, Adacel) 05/04/2011    Yellow Fever (YF-Vax) 05/23/2012    Zoster Recombinant (Shingrix) 08/14/2020   ,   Health Maintenance   Topic Date Due    COVID-19 Vaccine (1) Never done   [employer] Visit (AWV)  08/26/2021    Depression Screen  08/09/2022    Lipids  08/12/2022    Flu vaccine (Season Ended) 09/01/2022    Colorectal Cancer Screen  07/11/2028    DTaP/Tdap/Td vaccine (3 - Td or Tdap) 03/30/2032    Shingles vaccine  Completed    Pneumococcal 65+ years Vaccine  Completed    Hepatitis C screen  Completed    Hepatitis A vaccine  Aged Out    Hepatitis B vaccine  Aged Out    Hib vaccine  Aged Out    Meningococcal (ACWY) vaccine  Aged Out       PHYSICAL EXAMINATION:  [ INSTRUCTIONS:  \"[x]\" Indicates a positive item  \"[]\" Indicates a negative item  -- DELETE ALL ITEMS NOT EXAMINED]  Vital Signs: (As obtained by patient/caregiver or practitioner observation)    Blood pressure-  Heart rate-    Respiratory rate-    Temperature-  Pulse oximetry-     Constitutional: [x] Appears well-developed and well-nourished [x] No apparent distress      [] Abnormal-   Mental status  [x] Alert and awake  [] Oriented to person/place/time []Able to follow commands      Eyes:  EOM    [x]  Normal  [] Abnormal-  Sclera  [x]  Normal  [] Abnormal -         Discharge [x]  None visible  [] Abnormal -    HENT:   [x] Normocephalic, atraumatic.   [] Abnormal   [x] Mouth/Throat: Mucous membranes are moist.     External Ears [] Normal  [] Abnormal-     Neck: [x] No visualized mass     Pulmonary/Chest: [x] Respiratory effort normal.  [] No visualized signs of difficulty breathing or respiratory distress        [] Abnormal-      Musculoskeletal:   [] Normal gait with no signs of ataxia         [] Normal range of motion of neck        [] Abnormal-       Neurological:        [x] No Facial Asymmetry (Cranial nerve 7 motor function) (limited exam to video visit)          [] No gaze palsy        [] Abnormal-         Skin:        [x] No significant exanthematous lesions or discoloration noted on facial skin         [] Abnormal-            Psychiatric:       [] Normal Affect [] No Hallucinations        [] Abnormal-     Other pertinent observable physical exam findings-     ASSESSMENT/PLAN:      Diagnosis Orders   1. Essential hypertension     2. Hypercholesterolemia     3. Obstructive sleep apnea     Doing well  Continue the same  Follow-up in the office in 3 months    Anselmo Mcduffie, was evaluated through a synchronous (real-time) audio-video encounter. The patient (or guardian if applicable) is aware that this is a billable service, which includes applicable co-pays. This Virtual Visit was conducted with patient's (and/or legal guardian's) consent. The visit was conducted pursuant to the emergency declaration under the [address] [place] [address] authority and the [employer] and [employer] General Act. Patient identification was verified, and a caregiver was present when appropriate. The patient was located at home in a state where the provider was licensed to provide care. Total time spent on this encounter: Not billed by time    --Bettina Koehler MD on 5/16/2022 at 1:28 PM    An electronic signature was used to authenticate this note.

## 2022-06-09 ENCOUNTER — TELEPHONE (OUTPATIENT)
Dept: PRIMARY CARE CLINIC | Age: 72
End: 2022-06-09

## 2022-06-09 ENCOUNTER — TELEMEDICINE (OUTPATIENT)
Dept: PRIMARY CARE CLINIC | Age: 72
End: 2022-06-09
Payer: MEDICARE

## 2022-06-09 DIAGNOSIS — U07.1 COVID: Primary | ICD-10-CM

## 2022-06-09 PROCEDURE — 1036F TOBACCO NON-USER: CPT | Performed by: FAMILY MEDICINE

## 2022-06-09 PROCEDURE — 99212 OFFICE O/P EST SF 10 MIN: CPT | Performed by: FAMILY MEDICINE

## 2022-06-09 PROCEDURE — 3017F COLORECTAL CA SCREEN DOC REV: CPT | Performed by: FAMILY MEDICINE

## 2022-06-09 PROCEDURE — 1123F ACP DISCUSS/DSCN MKR DOCD: CPT | Performed by: FAMILY MEDICINE

## 2022-06-09 PROCEDURE — G8428 CUR MEDS NOT DOCUMENT: HCPCS | Performed by: FAMILY MEDICINE

## 2022-06-09 PROCEDURE — G8417 CALC BMI ABV UP PARAM F/U: HCPCS | Performed by: FAMILY MEDICINE

## 2022-06-09 NOTE — TELEPHONE ENCOUNTER
Patient called,    Just returned from [address] in [place] positive for Covid.     symptoms    Sore throat  Headache  T 101.9  cough  Nasal congestion     Scheduled mychart VV for today

## 2022-06-09 NOTE — PROGRESS NOTES
2022    TELEHEALTH EVALUATION -- Audio/Visual (During UZRBL-68 public health emergency)    HPI:    Radha Petersen (:  1950) has requested an audio/video evaluation for the following concern(s):    Upper Respiratory Infection: Patient complains of symptoms of a URI. Symptoms include congestion, cough and sore throat. Onset of symptoms was 2 days ago, gradually worsening since that time. He also c/o achiness, congestion, coryza, fever with Tmax to 100.5-101.9, nasal congestion, productive cough with  clear  colored sputum and sore throat for the past 3 days . He is drinking plenty of fluids. Evaluation to date: none. Treatment to date: oral decongestant,. Is doing overall well  Needs O2 sat above 95%  Is not interested medication  He has a meeting he wants to make sure it is proper to continue  On   As per CDC guidelines 5 days quarantine then 5 days with mask        Review of Systems   Constitutional: Positive for chills and fever. HENT: Positive for congestion. All other systems reviewed and are negative. Prior to Visit Medications    Medication Sig Taking? Authorizing Provider   lisinopril (PRINIVIL;ZESTRIL) 10 MG tablet TAKE ONE TABLET BY MOUTH TWICE J Carlos Mcclain MD   atorvastatin (LIPITOR) 20 MG tablet TAKE ONE TABLET BY MOUTH DAILY  Jordy Gage MD   mometasone (ELOCON) 0.1 % cream Apply topically daily. Jordy Gage MD   aspirin 81 MG chewable tablet Take 81 mg by mouth daily  Historical Provider, MD   Cetirizine HCl (ZYRTEC ALLERGY PO) Take by mouth  Historical Provider, MD       Social History     Tobacco Use    Smoking status: Never Smoker    Smokeless tobacco: Never Used   Vaping Use    Vaping Use: Never used   Substance Use Topics    Alcohol use:  Yes     Alcohol/week: 0.0 standard drinks     Types: 3 Glasses of wine per week     Comment: rare    Drug use: No        No Known Allergies,   Past Medical History:   Diagnosis Date    Colon polyps 7.26.06    x2    Hypertension     resolved with weight loss    Malaria 2011    [facility]    Obstructive sleep apnea     CPAP user    Obstructive sleep apnea (adult) (pediatric)    ,   Past Surgical History:   Procedure Laterality Date    CYST REMOVAL  1997    back x 2    LASIK  2001    SHOULDER ARTHROSCOPY      SHOULDER SURGERY Right 1/27/2020    RIGHT SHOULDER DIAGNOSTIC ARTHROSCOPY, EUA, MALENA, REVISION SUBACROMIAL DECOMPRESSION,  EXTENSIVE DEBRIDEMENT, REVISION ROTATOR CUFF REPAIR X 2, WITH COLLOGEN GRAFT AUGMENTATION performed by Niki Mustafa MD at [address] 2002   ,   Social History     Tobacco Use    Smoking status: Never Smoker    Smokeless tobacco: Never Used   Vaping Use    Vaping Use: Never used   Substance Use Topics    Alcohol use:  Yes     Alcohol/week: 0.0 standard drinks     Types: 3 Glasses of wine per week     Comment: rare    Drug use: No   ,   Family History   Problem Relation Age of Onset    Heart Attack Father 76        smoker    Sleep Apnea Brother     Sleep Apnea Brother     Crohn's Disease Brother         healthy    Heart Attack Brother 50   ,   Immunization History   Administered Date(s) Administered    Hepatitis A 11/22/2013, 07/11/2014    Pneumococcal Conjugate 13-valent ([id number]) 03/29/2018    Pneumococcal Polysaccharide ([id number]) 04/23/2019    Tdap (Boostrix, Adacel) 05/04/2011    Yellow Fever (YF-Vax) 05/23/2012    Zoster Recombinant (Shingrix) 08/14/2020   ,   Health Maintenance   Topic Date Due    COVID-19 Vaccine (1) Never done   [employer] Visit (AWV)  08/26/2021    Depression Screen  08/09/2022    Lipids  08/12/2022    Flu vaccine (Season Ended) 09/01/2022    Colorectal Cancer Screen  07/11/2028    DTaP/Tdap/Td vaccine (3 - Td or Tdap) 03/30/2032    Shingles vaccine  Completed    Pneumococcal 65+ years Vaccine  Completed    Hepatitis C screen  Completed    Hepatitis A vaccine  Aged Out    Hepatitis B vaccine  Aged Out    Hib vaccine  Aged Out    Meningococcal (ACWY) vaccine  Aged Out       PHYSICAL EXAMINATION:  [ INSTRUCTIONS:  \"[x]\" Indicates a positive item  \"[]\" Indicates a negative item  -- DELETE ALL ITEMS NOT EXAMINED]  Vital Signs: (As obtained by patient/caregiver or practitioner observation)    Blood pressure-  Heart rate-    Respiratory rate-    Temperature-  Pulse oximetry-     Constitutional: [x] Appears well-developed and well-nourished [x] No apparent distress      [] Abnormal-   Mental status  [x] Alert and awake  [x] Oriented to person/place/time []Able to follow commands      Eyes:  EOM    [x]  Normal  [] Abnormal-  Sclera  [x]  Normal  [] Abnormal -         Discharge [x]  None visible  [] Abnormal -    HENT:   [x] Normocephalic, atraumatic. [] Abnormal   [x] Mouth/Throat: Mucous membranes are moist.     External Ears [] Normal  [] Abnormal-     Neck: [] No visualized mass     Pulmonary/Chest: [] Respiratory effort normal.  [] No visualized signs of difficulty breathing or respiratory distress        [] Abnormal-      Musculoskeletal:   [] Normal gait with no signs of ataxia         [] Normal range of motion of neck        [] Abnormal-       Neurological:        [x] No Facial Asymmetry (Cranial nerve 7 motor function) (limited exam to video visit)          [] No gaze palsy        [] Abnormal-         Skin:        [] No significant exanthematous lesions or discoloration noted on facial skin         [] Abnormal-            Psychiatric:       [x] Normal Affect [] No Hallucinations        [] Abnormal-     Other pertinent observable physical exam findings-     ASSESSMENT/PLAN:   Diagnosis Orders   1. COVID     Symptomatic treatment discussed with patient  Guidance for contact other people is also discussed answered all his questions and concerns    Stephon Sarabia was evaluated through a synchronous (real-time) audio-video encounter.  The patient (or guardian if applicable) is aware that this is a billable service, which

## 2022-06-30 ENCOUNTER — TELEPHONE (OUTPATIENT)
Dept: PULMONOLOGY | Age: 72
End: 2022-06-30

## 2022-06-30 ENCOUNTER — TELEMEDICINE (OUTPATIENT)
Dept: PULMONOLOGY | Age: 72
End: 2022-06-30
Payer: MEDICARE

## 2022-06-30 DIAGNOSIS — I10 ESSENTIAL HYPERTENSION: Chronic | ICD-10-CM

## 2022-06-30 DIAGNOSIS — E66.09 CLASS 1 OBESITY DUE TO EXCESS CALORIES WITH SERIOUS COMORBIDITY AND BODY MASS INDEX (BMI) OF 34.0 TO 34.9 IN ADULT: Chronic | ICD-10-CM

## 2022-06-30 DIAGNOSIS — G47.33 OBSTRUCTIVE SLEEP APNEA: Chronic | ICD-10-CM

## 2022-06-30 PROBLEM — E66.9 CLASS 1 OBESITY WITHOUT SERIOUS COMORBIDITY WITH BODY MASS INDEX (BMI) OF 32.0 TO 32.9 IN ADULT: Chronic | Status: ACTIVE | Noted: 2019-10-10

## 2022-06-30 PROBLEM — E66.811 CLASS 1 OBESITY WITHOUT SERIOUS COMORBIDITY WITH BODY MASS INDEX (BMI) OF 32.0 TO 32.9 IN ADULT: Chronic | Status: ACTIVE | Noted: 2019-10-10

## 2022-06-30 PROCEDURE — 3017F COLORECTAL CA SCREEN DOC REV: CPT | Performed by: INTERNAL MEDICINE

## 2022-06-30 PROCEDURE — 99214 OFFICE O/P EST MOD 30 MIN: CPT | Performed by: INTERNAL MEDICINE

## 2022-06-30 PROCEDURE — 1123F ACP DISCUSS/DSCN MKR DOCD: CPT | Performed by: INTERNAL MEDICINE

## 2022-06-30 PROCEDURE — G8427 DOCREV CUR MEDS BY ELIG CLIN: HCPCS | Performed by: INTERNAL MEDICINE

## 2022-06-30 ASSESSMENT — SLEEP AND FATIGUE QUESTIONNAIRES
HOW LIKELY ARE YOU TO NOD OFF OR FALL ASLEEP WHILE SITTING AND READING: 1
HOW LIKELY ARE YOU TO NOD OFF OR FALL ASLEEP WHILE SITTING INACTIVE IN A PUBLIC PLACE: 1
HOW LIKELY ARE YOU TO NOD OFF OR FALL ASLEEP WHILE LYING DOWN TO REST IN THE AFTERNOON WHEN CIRCUMSTANCES PERMIT: 2
HOW LIKELY ARE YOU TO NOD OFF OR FALL ASLEEP WHILE SITTING QUIETLY AFTER LUNCH WITHOUT ALCOHOL: 0
HOW LIKELY ARE YOU TO NOD OFF OR FALL ASLEEP WHEN YOU ARE A PASSENGER IN A CAR FOR AN HOUR WITHOUT A BREAK: 0
HOW LIKELY ARE YOU TO NOD OFF OR FALL ASLEEP IN A CAR, WHILE STOPPED FOR A FEW MINUTES IN TRAFFIC: 0
HOW LIKELY ARE YOU TO NOD OFF OR FALL ASLEEP WHILE SITTING AND TALKING TO SOMEONE: 0
HOW LIKELY ARE YOU TO NOD OFF OR FALL ASLEEP WHILE WATCHING TV: 1
ESS TOTAL SCORE: 5

## 2022-06-30 NOTE — ASSESSMENT & PLAN NOTE
Chronic-progressing: Reviewed and analyzed results of physiologic download from patient's machine and reviewed with patient. Supplies and parts as needed for his machine. These are medically necessary. Limit caffeine use after 3pm. Based on the analyzed data will change following settings: Pmin-11. We will see if the reduction in pressure helps control his central apneas due to his weight loss. If its not better then he may need an in lab titration to formally assess with happening.

## 2022-06-30 NOTE — PROGRESS NOTES
[facility]  [address] [facility], [address] [facility]- [phone]   [facility] [address] Leonia Osgood. [address] [place] [address]. Adwoa Serrano [address] [phone]     Video Visit- Follow up    [address] [facility], was evaluated through a synchronous (real-time) audio-video  encounter. The patient (or guardian if applicable) is aware that this is a billable  service, which includes applicable co-pays. This Virtual Visit was conducted with  patient's (and/or legal guardian's) consent. The visit was conducted pursuant to  the emergency declaration under the [facility], [address] [place] [address] [facility] authority and the [employer] and  [employer] General Act. Patient identification was verified,  and a caregiver was present when appropriate. The patient was located in a  state where the provider was licensed to provide care. Assessment/Plan:      1. Obstructive sleep apnea  Assessment & Plan:  Chronic-progressing: Reviewed and analyzed results of physiologic download from patient's machine and reviewed with patient. Supplies and parts as needed for his machine. These are medically necessary. Limit caffeine use after 3pm. Based on the analyzed data will change following settings: Pmin-11. We will see if the reduction in pressure helps control his central apneas due to his weight loss. If its not better then he may need an in lab titration to formally assess with happening. 2. Essential hypertension  Assessment & Plan:  Chronic- Stable. Discussed the importance of treating sleep apnea as part of the management of this disorder. Cont any meds per PCP and other physicians. 3. Class 1 obesity due to excess calories with serious comorbidity and body mass index (BMI) of 34.0 to 34.9 in adult  Assessment & Plan:  Chronic-not stable:  Discussed importance of treating obstructive sleep apnea and getting sufficient sleep to assist with weight control. Encouraged him to work on weight loss through diet and exercise. Recommended DASH or Mediterranean diets. Reviewed, analyzed, and documented physiologic data from patient's PAP machine. This information was analyzed to assess complexity and medical decision making in regards to further testing and management. Diagnoses of Obstructive sleep apnea, Essential hypertension, and Class 1 obesity due to excess calories with serious comorbidity and body mass index (BMI) of 34.0 to 34.9 in adult were pertinent to this visit. The chronic medical conditions listed are directly related to the primary diagnosis listed above. The management of the primary diagnosis affects the secondary diagnosis and vice versa. Subjective:     Patient ID: Narcisa Laura is a 71 y.o. male. Chief Complaint   Patient presents with    Sleep Apnea     Subjective   HPI:    Machine Modem/Download Info:  Compliance (hours/night): 6 hrs/night  % of nights >= 4 hrs: 82.2 %  Download AHI (/hour): 11.8 /HR  Average CPAP Pressure : 16.8 cmH2O   APAP - Settings  Pressure Min: 15 cmH2O  Pressure Max: 20 cmH2O                 Comfort Settings  Humidity Level (0-8): 4  Heated Tubing (Yes/No): Yes  Flex/EPR (0-3): 3       Has lost significant weight and now having more issues using his machine. The pressure feels high and he is having a lot of mask leak. Megha Gtz shows an increase in central apneas ever since he is lost the weight. [facility]    [place] - Total score: 5    Current Outpatient Medications   Medication Instructions    aspirin 81 mg, Oral, DAILY    atorvastatin (LIPITOR) 20 MG tablet TAKE ONE TABLET BY MOUTH DAILY    Cetirizine HCl (ZYRTEC ALLERGY PO) Oral    lisinopril (PRINIVIL;ZESTRIL) 10 MG tablet TAKE ONE TABLET BY MOUTH TWICE A DAY    mometasone (ELOCON) 0.1 % cream Apply topically daily.         Electronically signed by Severo Sutton MD on 6/30/2022 at 10:02 AM

## 2022-06-30 NOTE — PROGRESS NOTES
Initiate Treatment: Doxycycline 100mg po qd [place]         : 1950    Diagnosis: [x] ISAURO (G47.33) [] CSA (G47.31) [] Apnea (G47.30)   Length of Need: [x] 18 Months [] 99 Months [] Other:    Machine (LILIA!): [] Respironics Dream Station      Auto [] ResMed AirSense     Auto [] Other:     []  CPAP () [] Bilevel ()   Mode: [] Auto [] Spontaneous    Mode: [] Auto [] Spontaneous            Comfort Settings:        Humidifier: [] Heated ()        [x] Water chamber replacement ()/ 1 per 6 months        Mask:   [x] Nasal () /1 per 3 months [] Full Face () /1 per 3 months   [x] Patient choice -Size and fit mask [] Patient Choice - Size and fit mask   [] Dispense:  [] Dispense:    [x] Headgear () / 1 per 3 months [] Headgear () / 1 per 3 months   [x] Replacement Nasal Cushion ()/2 per month [] Interface Replacement ()/1 per month   [x] Replacement Nasal Pillows ()/2 per month         Tubing: [x] Heated ()/1 per 3 months    [] Standard ()/1 per 3 months [] Other:           Filters: [x] Non-disposable ()/1 per 6 months     [x] Ultra-Fine, Disposable ()/2 per month        Miscellaneous: [] Chin Strap ()/ 1 per 6 months [] O2 bleed-in:       LPM   [] Oximetry on CPAP/Bilevel []  Other:    [x] Modem: ()         Start Order Date: 22    MEDICAL JUSTIFICATION:  I, the undersigned, certify that the above prescribed supplies are medically necessary for this patients wellbeing. In my opinion, the supplies are both reasonable and necessary in reference to accepted standards of medicalpractice in treatment of this patients condition.     Carson Callow, MD      NPI: [id number]        Order Signed Date: 22    Electronically signed by Carson Callow, MD on 2022 at 9:51 AM    Arthmae Garcíamartin Hardy  1950  [address] [address]  [phone] (home)   [phone] (mobile)      Insurance Info (confirm with patient if correct):  Payor/Plan Subscr  Sex Relation Sub.  Ins. ID Effective Group Num Continue Regimen: Benzaclin Pump 1 %-5 % topical gel, Apply a thin layer to face  QAM\\nTretinoin 0.025% cream qhs Detail Level: Zone

## 2022-06-30 NOTE — TELEPHONE ENCOUNTER
Spoke with pt to discuss the [employer] recall. Pt was given the number to call and register his Dreamstation.

## 2022-06-30 NOTE — LETTER
[facility]  [place] [address] [place] [address]  Phone: [phone]  Fax: [phone]    Vane Davidson MD    June 30, 2022     Bettina Koehler MD  [address] [facility] [address] [facility] [address] [facility] [address]    Patient: Anselmo Mcduffie   MR Number: [id number]   YOB: 1950   Date of Visit: 6/30/2022       Dear Bettina Koehler: Thank you for referring Daniel Edwards to me for evaluation/treatment. Below are the relevant portions of my assessment and plan of care. 1. Obstructive sleep apnea  Assessment & Plan:  Chronic-progressing: Reviewed and analyzed results of physiologic download from patient's machine and reviewed with patient. Supplies and parts as needed for his machine. These are medically necessary. Limit caffeine use after 3pm. Based on the analyzed data will change following settings: Pmin-11. We will see if the reduction in pressure helps control his central apneas due to his weight loss. If its not better then he may need an in lab titration to formally assess with happening. 2. Essential hypertension  Assessment & Plan:  Chronic- Stable. Discussed the importance of treating sleep apnea as part of the management of this disorder. Cont any meds per PCP and other physicians. 3. Class 1 obesity due to excess calories with serious comorbidity and body mass index (BMI) of 34.0 to 34.9 in adult  Assessment & Plan:  Chronic-not stable:  Discussed importance of treating obstructive sleep apnea and getting sufficient sleep to assist with weight control. Encouraged him to work on weight loss through diet and exercise. Recommended DASH or Mediterranean diets. Reviewed, analyzed, and documented physiologic data from patient's PAP machine. This information was analyzed to assess complexity and medical decision making in regards to further testing and management.     Diagnoses of Obstructive sleep apnea, Essential hypertension, and Class 1 obesity due to excess calories with serious comorbidity and body mass index (BMI) of 34.0 to 34.9 in adult were pertinent to this visit. The chronic medical conditions listed are directly related to the primary diagnosis listed above. The management of the primary diagnosis affects the secondary diagnosis and vice versa. If you have questions, please do not hesitate to call me. I look forward to following Elisha De Dios along with you.     Sincerely,      Jorge Calabrese MD

## 2022-07-07 RX ORDER — LISINOPRIL 10 MG/1
TABLET ORAL
Qty: 90 TABLET | Refills: 1 | Status: SHIPPED | OUTPATIENT
Start: 2022-07-07 | End: 2022-08-29

## 2022-07-07 NOTE — TELEPHONE ENCOUNTER
Medication:   Requested Prescriptions     Pending Prescriptions Disp Refills    lisinopril (PRINIVIL;ZESTRIL) 10 MG tablet 90 tablet 1     Sig: TAKE ONE TABLET BY MOUTH TWICE A DAY     Last Filled: 5.16.22    Last appt: 5/16/2022   Next appt: Visit date not found    Last OARRS: No flowsheet data found.

## 2022-08-03 NOTE — TELEPHONE ENCOUNTER
Medication:   Requested Prescriptions     Pending Prescriptions Disp Refills    atorvastatin (LIPITOR) 20 MG tablet 90 tablet 0     Sig: TAKE ONE TABLET BY MOUTH DAILY     Last Filled:  5/16/22    Last appt: 6/9/2022   Next appt: Visit date not found    Last Lipid:   Lab Results   Component Value Date/Time    CHOL 138 08/12/2021 07:42 AM    TRIG 75 08/12/2021 07:42 AM    HDL 58 08/12/2021 07:42 AM    LDLCALC 65 08/12/2021 07:42 AM

## 2022-08-04 RX ORDER — ATORVASTATIN CALCIUM 20 MG/1
TABLET, FILM COATED ORAL
Qty: 90 TABLET | Refills: 0 | Status: SHIPPED | OUTPATIENT
Start: 2022-08-04 | End: 2022-10-21 | Stop reason: SDUPTHER

## 2022-08-26 SDOH — HEALTH STABILITY: PHYSICAL HEALTH: ON AVERAGE, HOW MANY DAYS PER WEEK DO YOU ENGAGE IN MODERATE TO STRENUOUS EXERCISE (LIKE A BRISK WALK)?: 6 DAYS

## 2022-08-26 SDOH — HEALTH STABILITY: PHYSICAL HEALTH: ON AVERAGE, HOW MANY MINUTES DO YOU ENGAGE IN EXERCISE AT THIS LEVEL?: 40 MIN

## 2022-08-26 ASSESSMENT — PATIENT HEALTH QUESTIONNAIRE - PHQ9
2. FEELING DOWN, DEPRESSED OR HOPELESS: 0
SUM OF ALL RESPONSES TO PHQ9 QUESTIONS 1 & 2: 0
SUM OF ALL RESPONSES TO PHQ QUESTIONS 1-9: 0
1. LITTLE INTEREST OR PLEASURE IN DOING THINGS: 0
SUM OF ALL RESPONSES TO PHQ QUESTIONS 1-9: 0

## 2022-08-26 ASSESSMENT — LIFESTYLE VARIABLES
HOW MANY STANDARD DRINKS CONTAINING ALCOHOL DO YOU HAVE ON A TYPICAL DAY: 1
HOW OFTEN DO YOU HAVE SIX OR MORE DRINKS ON ONE OCCASION: 1
HOW OFTEN DO YOU HAVE A DRINK CONTAINING ALCOHOL: 2-4 TIMES A MONTH
HOW OFTEN DO YOU HAVE A DRINK CONTAINING ALCOHOL: 3
HOW MANY STANDARD DRINKS CONTAINING ALCOHOL DO YOU HAVE ON A TYPICAL DAY: 1 OR 2

## 2022-08-29 ENCOUNTER — OFFICE VISIT (OUTPATIENT)
Dept: PRIMARY CARE CLINIC | Age: 72
End: 2022-08-29
Payer: MEDICARE

## 2022-08-29 VITALS
HEART RATE: 55 BPM | BODY MASS INDEX: 28.37 KG/M2 | OXYGEN SATURATION: 99 % | DIASTOLIC BLOOD PRESSURE: 98 MMHG | TEMPERATURE: 97.2 F | SYSTOLIC BLOOD PRESSURE: 158 MMHG | RESPIRATION RATE: 14 BRPM | WEIGHT: 221 LBS

## 2022-08-29 DIAGNOSIS — Z00.00 MEDICARE ANNUAL WELLNESS VISIT, SUBSEQUENT: Primary | ICD-10-CM

## 2022-08-29 DIAGNOSIS — I10 ESSENTIAL HYPERTENSION: ICD-10-CM

## 2022-08-29 PROCEDURE — 1123F ACP DISCUSS/DSCN MKR DOCD: CPT | Performed by: FAMILY MEDICINE

## 2022-08-29 PROCEDURE — 3017F COLORECTAL CA SCREEN DOC REV: CPT | Performed by: FAMILY MEDICINE

## 2022-08-29 PROCEDURE — G8427 DOCREV CUR MEDS BY ELIG CLIN: HCPCS | Performed by: FAMILY MEDICINE

## 2022-08-29 PROCEDURE — G0439 PPPS, SUBSEQ VISIT: HCPCS | Performed by: FAMILY MEDICINE

## 2022-08-29 PROCEDURE — G8417 CALC BMI ABV UP PARAM F/U: HCPCS | Performed by: FAMILY MEDICINE

## 2022-08-29 PROCEDURE — 99213 OFFICE O/P EST LOW 20 MIN: CPT | Performed by: FAMILY MEDICINE

## 2022-08-29 PROCEDURE — 1036F TOBACCO NON-USER: CPT | Performed by: FAMILY MEDICINE

## 2022-08-29 RX ORDER — LISINOPRIL 20 MG/1
TABLET ORAL
Qty: 180 TABLET | Refills: 1 | Status: SHIPPED | OUTPATIENT
Start: 2022-08-29

## 2022-08-29 SDOH — ECONOMIC STABILITY: FOOD INSECURITY: WITHIN THE PAST 12 MONTHS, THE FOOD YOU BOUGHT JUST DIDN'T LAST AND YOU DIDN'T HAVE MONEY TO GET MORE.: NEVER TRUE

## 2022-08-29 SDOH — ECONOMIC STABILITY: FOOD INSECURITY: WITHIN THE PAST 12 MONTHS, YOU WORRIED THAT YOUR FOOD WOULD RUN OUT BEFORE YOU GOT MONEY TO BUY MORE.: NEVER TRUE

## 2022-08-29 ASSESSMENT — SOCIAL DETERMINANTS OF HEALTH (SDOH): HOW HARD IS IT FOR YOU TO PAY FOR THE VERY BASICS LIKE FOOD, HOUSING, MEDICAL CARE, AND HEATING?: NOT HARD AT ALL

## 2022-08-29 NOTE — PROGRESS NOTES
Medicare Annual Wellness Visit    Bettie Soto is here for Medicare AWV    Assessment & Plan    Diagnosis Orders   1. Medicare annual wellness visit, subsequent        2. Essential hypertension             Recommendations for Preventive Services Due: see orders and patient instructions/AVS.  Recommended screening schedule for the next 5-10 years is provided to the patient in written form: see Patient Instructions/AVS.          Subjective   The following acute and/or chronic problems were also addressed today:  Hypertension   Hypertension: Patient here for follow-up of elevated blood pressure. He is exercising and is adherent to low salt diet. Blood pressure is well controlled at home. Cardiac symptoms none. Patient denies chest pain, chest pressure/discomfort, claudication, dyspnea, exertional chest pressure/discomfort, fatigue, irregular heart beat, lower extremity edema, near-syncope, orthopnea, palpitations and paroxysmal nocturnal dyspnea. Cardiovascular risk factors: dyslipidemia, family history of premature cardiovascular disease, male gender and obesity (BMI >= 30 kg/m2). Use of agents associated with hypertension: none. History of target organ damage: none. Abnormal blood pressure reading today in the office  Patient was really trying to lose weight on [place] program    Patient's complete Health Risk Assessment and screening values have been reviewed and are found in Flowsheets. The following problems were reviewed today and where indicated follow up appointments were made and/or referrals ordered.     Positive Risk Factor Screenings with Interventions:             General Health and ACP:  General  In general, how would you say your health is?: Very Good  In the past 7 days, have you experienced any of the following: New or Increased Pain, New or Increased Fatigue, Loneliness, Social Isolation, Stress or Anger?: No  Do you get the social and emotional support that you need?: Yes  Do you have a Living non-distended, normal bowel sounds, no masses or organomegaly  Extremities: no cyanosis, clubbing or edema  Musculoskeletal: normal range of motion, no joint swelling, deformity or tenderness  Neurologic: reflexes normal and symmetric, no cranial nerve deficit, gait, coordination and speech normal       No Known Allergies  Prior to Visit Medications    Medication Sig Taking? Authorizing Provider   atorvastatin (LIPITOR) 20 MG tablet TAKE ONE TABLET BY MOUTH DAILY Yes Geneveive Bird MD   lisinopril (PRINIVIL;ZESTRIL) 10 MG tablet TAKE ONE TABLET BY MOUTH TWICE A DAY Yes Genevieve Bird MD   mometasone (ELOCON) 0.1 % cream Apply topically daily.  Yes Genevieve Bird MD   aspirin 81 MG chewable tablet Take 81 mg by mouth daily Yes Historical Provider, MD   Cetirizine HCl (ZYRTEC ALLERGY PO) Take by mouth Yes Historical Provider, MD Humphreys (Including outside providers/suppliers regularly involved in providing care):   Patient Care Team:  Genevieve Bird MD as PCP - General (Family Medicine)  Genevieve Bird MD as PCP - [facility] Empaneled Provider  Sravan Bustamante MD as Consulting Physician ([address])  Alfredo Garcia MD as Surgeon (Orthopedic Surgery)  KATHRYN Pulido CNP as Nurse Practitioner (Nurse Practitioner)     Reviewed and updated this visit:  Allergies  Meds

## 2022-08-29 NOTE — PATIENT INSTRUCTIONS
Personalized Preventive Plan for [facility] - 8/29/2022  Medicare offers a range of preventive health benefits. Some of the tests and screenings are paid in full while other may be subject to a deductible, co-insurance, and/or copay. Some of these benefits include a comprehensive review of your medical history including lifestyle, illnesses that may run in your family, and various assessments and screenings as appropriate. After reviewing your medical record and screening and assessments performed today your provider may have ordered immunizations, labs, imaging, and/or referrals for you. A list of these orders (if applicable) as well as your Preventive Care list are included within your After Visit Summary for your review. Other Preventive Recommendations:    A preventive eye exam performed by an eye specialist is recommended every 1-2 years to screen for glaucoma; cataracts, macular degeneration, and other eye disorders. A preventive dental visit is recommended every 6 months. Try to get at least 150 minutes of exercise per week or 10,000 steps per day on a pedometer . Order or download the FREE \"Exercise & Physical Activity: Your Everyday Guide\" from [employer]. Call 2-375.951.7768 or search [employer] online. You need 8071-8677 mg of calcium and 0021-5391 IU of vitamin D per day. It is possible to meet your calcium requirement with diet alone, but a vitamin D supplement is usually necessary to meet this goal.  When exposed to the sun, use a sunscreen that protects against both UVA and UVB radiation with an SPF of 30 or greater. Reapply every 2 to 3 hours or after sweating, drying off with a towel, or swimming. Always wear a seat belt when traveling in a car. Always wear a helmet when riding a bicycle or motorcycle.

## 2022-09-13 RX ORDER — DOXYCYCLINE HYCLATE 100 MG
100 TABLET ORAL 2 TIMES DAILY
Qty: 20 TABLET | Refills: 0 | Status: SHIPPED | OUTPATIENT
Start: 2022-09-13 | End: 2022-09-23

## 2022-10-21 ENCOUNTER — OFFICE VISIT (OUTPATIENT)
Dept: PRIMARY CARE CLINIC | Age: 72
End: 2022-10-21
Payer: MEDICARE

## 2022-10-21 VITALS
RESPIRATION RATE: 14 BRPM | TEMPERATURE: 98 F | HEART RATE: 79 BPM | WEIGHT: 212.6 LBS | OXYGEN SATURATION: 95 % | SYSTOLIC BLOOD PRESSURE: 161 MMHG | HEIGHT: 74 IN | BODY MASS INDEX: 27.28 KG/M2 | DIASTOLIC BLOOD PRESSURE: 102 MMHG

## 2022-10-21 DIAGNOSIS — E78.00 HYPERCHOLESTEROLEMIA: ICD-10-CM

## 2022-10-21 DIAGNOSIS — I10 ESSENTIAL HYPERTENSION: ICD-10-CM

## 2022-10-21 DIAGNOSIS — I10 ESSENTIAL HYPERTENSION: Primary | ICD-10-CM

## 2022-10-21 DIAGNOSIS — H69.83 DYSFUNCTION OF BOTH EUSTACHIAN TUBES: ICD-10-CM

## 2022-10-21 LAB
ALBUMIN SERPL-MCNC: 4.4 G/DL (ref 3.4–5)
ALP BLD-CCNC: 43 U/L (ref 40–129)
ALT SERPL-CCNC: 7 U/L (ref 10–40)
ANION GAP SERPL CALCULATED.3IONS-SCNC: 12 MMOL/L (ref 3–16)
AST SERPL-CCNC: 17 U/L (ref 15–37)
BILIRUB SERPL-MCNC: 0.9 MG/DL (ref 0–1)
BILIRUBIN DIRECT: <0.2 MG/DL (ref 0–0.3)
BILIRUBIN, INDIRECT: ABNORMAL MG/DL (ref 0–1)
BUN BLDV-MCNC: 15 MG/DL (ref 7–20)
CALCIUM SERPL-MCNC: 9.4 MG/DL (ref 8.3–10.6)
CHLORIDE BLD-SCNC: 104 MMOL/L (ref 99–110)
CHOLESTEROL, TOTAL: 150 MG/DL (ref 0–199)
CO2: 23 MMOL/L (ref 21–32)
CREAT SERPL-MCNC: 0.8 MG/DL (ref 0.8–1.3)
GFR SERPL CREATININE-BSD FRML MDRD: >60 ML/MIN/{1.73_M2}
GLUCOSE BLD-MCNC: 95 MG/DL (ref 70–99)
HCT VFR BLD CALC: 43.3 % (ref 40.5–52.5)
HDLC SERPL-MCNC: 67 MG/DL (ref 40–60)
HEMOGLOBIN: 14.5 G/DL (ref 13.5–17.5)
LDL CHOLESTEROL CALCULATED: 73 MG/DL
MCH RBC QN AUTO: 29.5 PG (ref 26–34)
MCHC RBC AUTO-ENTMCNC: 33.5 G/DL (ref 31–36)
MCV RBC AUTO: 88.3 FL (ref 80–100)
PDW BLD-RTO: 14.7 % (ref 12.4–15.4)
PLATELET # BLD: 224 K/UL (ref 135–450)
PMV BLD AUTO: 7.4 FL (ref 5–10.5)
POTASSIUM SERPL-SCNC: 4.2 MMOL/L (ref 3.5–5.1)
RBC # BLD: 4.9 M/UL (ref 4.2–5.9)
SODIUM BLD-SCNC: 139 MMOL/L (ref 136–145)
TOTAL PROTEIN: 6.9 G/DL (ref 6.4–8.2)
TRIGL SERPL-MCNC: 49 MG/DL (ref 0–150)
TSH SERPL DL<=0.05 MIU/L-ACNC: 1.21 UIU/ML (ref 0.27–4.2)
VLDLC SERPL CALC-MCNC: 10 MG/DL
WBC # BLD: 6.1 K/UL (ref 4–11)

## 2022-10-21 PROCEDURE — G8427 DOCREV CUR MEDS BY ELIG CLIN: HCPCS | Performed by: FAMILY MEDICINE

## 2022-10-21 PROCEDURE — G8484 FLU IMMUNIZE NO ADMIN: HCPCS | Performed by: FAMILY MEDICINE

## 2022-10-21 PROCEDURE — 1123F ACP DISCUSS/DSCN MKR DOCD: CPT | Performed by: FAMILY MEDICINE

## 2022-10-21 PROCEDURE — 1036F TOBACCO NON-USER: CPT | Performed by: FAMILY MEDICINE

## 2022-10-21 PROCEDURE — 99214 OFFICE O/P EST MOD 30 MIN: CPT | Performed by: FAMILY MEDICINE

## 2022-10-21 PROCEDURE — G8417 CALC BMI ABV UP PARAM F/U: HCPCS | Performed by: FAMILY MEDICINE

## 2022-10-21 PROCEDURE — 3017F COLORECTAL CA SCREEN DOC REV: CPT | Performed by: FAMILY MEDICINE

## 2022-10-21 RX ORDER — ATORVASTATIN CALCIUM 20 MG/1
TABLET, FILM COATED ORAL
Qty: 90 TABLET | Refills: 1 | Status: SHIPPED | OUTPATIENT
Start: 2022-10-21

## 2022-10-21 RX ORDER — INDAPAMIDE 1.25 MG/1
1.25 TABLET, FILM COATED ORAL EVERY MORNING
Qty: 30 TABLET | Refills: 2 | Status: SHIPPED | OUTPATIENT
Start: 2022-10-21

## 2022-10-21 RX ORDER — FLUTICASONE PROPIONATE 50 MCG
2 SPRAY, SUSPENSION (ML) NASAL DAILY
Qty: 16 G | Refills: 5 | Status: SHIPPED | OUTPATIENT
Start: 2022-10-21

## 2022-10-21 NOTE — PROGRESS NOTES
Subjective:      Patient ID: Marjorie Moseley is a 71 y.o. male. HPI  Hypertension: Patient here for follow-up of elevated blood pressure. He is exercising and is adherent to low salt diet. Blood pressure is well controlled at home. Cardiac symptoms none. Patient denies chest pain, chest pressure/discomfort, claudication, dyspnea, exertional chest pressure/discomfort, fatigue, irregular heart beat, lower extremity edema, near-syncope, orthopnea, palpitations and paroxysmal nocturnal dyspnea. Cardiovascular risk factors: dyslipidemia, family history of premature cardiovascular disease, male gender and obesity (BMI >= 30 kg/m2). Use of agents associated with hypertension: none. History of target organ damage: none.'s reading normal at home today also was high here  Is not a lot of stress  Losing weight by eating and doing diet program [place]  He has been walking daily otherwise has been feeling good  Pt with history of sleep apnea doing well on CPAP  History of hyperlipidemia on med's doing well     Review of Systems   Constitutional: Negative. HENT: Negative. All other systems reviewed and are negative.     Past Medical History:   Diagnosis Date    Colon polyps 7.26.06    x2    Hypertension     resolved with weight loss    Malaria 2011    [facility]    Obstructive sleep apnea     CPAP user    Obstructive sleep apnea (adult) (pediatric)       Past Surgical History:   Procedure Laterality Date    CYST REMOVAL  1997    back x 2    LASIK  2001    SHOULDER ARTHROSCOPY      SHOULDER SURGERY Right 1/27/2020    RIGHT SHOULDER DIAGNOSTIC ARTHROSCOPY, EUA, MALENA, REVISION SUBACROMIAL DECOMPRESSION,  EXTENSIVE DEBRIDEMENT, REVISION ROTATOR CUFF REPAIR X 2, WITH COLLOGEN GRAFT AUGMENTATION performed by Becca Beltre MD at [address] [facility]  2002     Family History   Problem Relation Age of Onset    Heart Attack Father 76        smoker    Sleep Apnea Brother     Sleep Apnea Brother     Crohn's Disease Brother healthy    Heart Attack Brother 50     Social History     Socioeconomic History    Marital status:      Spouse name: Rafa Abarca    Number of children: 4    Years of education: None    Highest education level: None   Occupational History    Occupation: retired     Comment: Missionary   Tobacco Use    Smoking status: Never    Smokeless tobacco: Never   Vaping Use    Vaping Use: Never used   Substance and Sexual Activity    Alcohol use: Yes     Alcohol/week: 0.0 standard drinks     Types: 3 Glasses of wine per week     Comment: rare    Drug use: No    Sexual activity: Yes     Partners: Female     Comment: M ,4 kids 27 y,26,35,26 yo   Social History Narrative    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[employer]     4 children     9 G children      Social Determinants of Health     Financial Resource Strain: Low Risk     Difficulty of Paying Living Expenses: Not hard at all   Food Insecurity: No Food Insecurity    Worried About Running Out of Food in the Last Year: Never true    Ran Out of Food in the Last Year: Never true   Physical Activity: Sufficiently Active    Days of Exercise per Week: 6 days    Minutes of Exercise per Session: 40 min     Current Outpatient Medications   Medication Sig Dispense Refill    lisinopril (PRINIVIL;ZESTRIL) 20 MG tablet TAKE ONE TABLET BY MOUTH TWICE A  tablet 1    atorvastatin (LIPITOR) 20 MG tablet TAKE ONE TABLET BY MOUTH DAILY 90 tablet 0    mometasone (ELOCON) 0.1 % cream Apply topically daily. 50 g 0    aspirin 81 MG chewable tablet Take 81 mg by mouth daily      Cetirizine HCl (ZYRTEC ALLERGY PO) Take by mouth       No current facility-administered medications for this visit. No Known Allergies      Objective:   Physical Exam   Vitals reviewed. Constitutional: He is oriented to person, place, and time. He appears well-developed and well-nourished. HENT:   Mouth/Throat: Oropharynx is clear and moist.   Eyes: Conjunctivae are normal. No scleral icterus.    Neck: Normal range of motion. Neck supple. No JVD present. Carotid bruit is not present. No thyromegaly present. Cardiovascular: Normal rate, regular rhythm, normal heart sounds and intact distal pulses. No murmur heard. Pulmonary/Chest: Effort normal and breath sounds normal. No respiratory distress. He has no wheezes. He has no rales. He exhibits no tenderness. Abdominal: Soft. Bowel sounds are normal. He exhibits no distension and no mass. There is no tenderness. There is no rebound and no guarding. Musculoskeletal: Normal range of motion. He exhibits no edema and  tenderness. at the left shoulder antonietta with internal rotation, no swelling muscle strength is 5/5   Neurological: He is alert and oriented to person, place, and time. Skin: No rash noted. Assessment:           Diagnosis Orders   1. Essential hypertension  CBC    Basic Metabolic Panel    Lipid Panel    Hepatic Function Panel    TSH      2. Hypercholesterolemia  Lipid Panel    Hepatic Function Panel      3.  Dysfunction of both eustachian tubes                Plan:      See orders, discussed healthier life style  Added indapamide blood pressure medication  Monitor blood pressure at home  Close follow-up  Low-sodium intake/DASH diet information is given to patient

## 2022-11-21 RX ORDER — INDAPAMIDE 1.25 MG/1
1.25 TABLET, FILM COATED ORAL EVERY MORNING
Qty: 30 TABLET | Refills: 0 | Status: SHIPPED | OUTPATIENT
Start: 2022-11-21 | End: 2022-12-05 | Stop reason: SDUPTHER

## 2022-11-21 NOTE — TELEPHONE ENCOUNTER
Medication:   Requested Prescriptions     Pending Prescriptions Disp Refills    indapamide (LOZOL) 1.25 MG tablet 30 tablet 2     Sig: Take 1 tablet by mouth every morning     Last Filled:  10.21.22    Last appt: 10/21/2022   Next appt: 12/5/2022    Last OARRS: No flowsheet data found.

## 2022-12-05 ENCOUNTER — OFFICE VISIT (OUTPATIENT)
Dept: PRIMARY CARE CLINIC | Age: 72
End: 2022-12-05
Payer: MEDICARE

## 2022-12-05 VITALS
HEART RATE: 60 BPM | BODY MASS INDEX: 26.96 KG/M2 | WEIGHT: 210 LBS | SYSTOLIC BLOOD PRESSURE: 120 MMHG | DIASTOLIC BLOOD PRESSURE: 70 MMHG | OXYGEN SATURATION: 94 %

## 2022-12-05 DIAGNOSIS — I10 ESSENTIAL HYPERTENSION: Primary | ICD-10-CM

## 2022-12-05 DIAGNOSIS — E78.00 HYPERCHOLESTEROLEMIA: ICD-10-CM

## 2022-12-05 PROCEDURE — G8484 FLU IMMUNIZE NO ADMIN: HCPCS | Performed by: FAMILY MEDICINE

## 2022-12-05 PROCEDURE — G8417 CALC BMI ABV UP PARAM F/U: HCPCS | Performed by: FAMILY MEDICINE

## 2022-12-05 PROCEDURE — 99213 OFFICE O/P EST LOW 20 MIN: CPT | Performed by: FAMILY MEDICINE

## 2022-12-05 PROCEDURE — 3078F DIAST BP <80 MM HG: CPT | Performed by: FAMILY MEDICINE

## 2022-12-05 PROCEDURE — 3017F COLORECTAL CA SCREEN DOC REV: CPT | Performed by: FAMILY MEDICINE

## 2022-12-05 PROCEDURE — 3074F SYST BP LT 130 MM HG: CPT | Performed by: FAMILY MEDICINE

## 2022-12-05 PROCEDURE — G8427 DOCREV CUR MEDS BY ELIG CLIN: HCPCS | Performed by: FAMILY MEDICINE

## 2022-12-05 PROCEDURE — 1123F ACP DISCUSS/DSCN MKR DOCD: CPT | Performed by: FAMILY MEDICINE

## 2022-12-05 PROCEDURE — 1036F TOBACCO NON-USER: CPT | Performed by: FAMILY MEDICINE

## 2022-12-05 RX ORDER — INDAPAMIDE 1.25 MG/1
1.25 TABLET, FILM COATED ORAL EVERY MORNING
Qty: 90 TABLET | Refills: 1 | Status: SHIPPED | OUTPATIENT
Start: 2022-12-05

## 2022-12-05 NOTE — PROGRESS NOTES
Subjective:      Patient ID: Shivani Henderson is a 71 y.o. male. HPI  Hypertension: Patient here for follow-up of elevated blood pressure. He is exercising and is adherent to low salt diet. Blood pressure is well controlled at home. Cardiac symptoms none. Patient denies chest pain, chest pressure/discomfort, claudication, dyspnea, exertional chest pressure/discomfort, fatigue, irregular heart beat, lower extremity edema, near-syncope, orthopnea, palpitations and paroxysmal nocturnal dyspnea. Cardiovascular risk factors: dyslipidemia, family history of premature cardiovascular disease, male gender and obesity (BMI >= 30 kg/m2). Use of agents associated with hypertension: none. History of target organ damage: none.'s reading normal at home today also was high here  Pt with history of sleep apnea doing well on CPAP  History of hyperlipidemia on med's doing well     Review of Systems   Constitutional: Negative. HENT: Negative. All other systems reviewed and are negative.     Past Medical History:   Diagnosis Date    Colon polyps 7.26.06    x2    Hypertension     resolved with weight loss    Malaria 2011    [facility]    Obstructive sleep apnea     CPAP user    Obstructive sleep apnea (adult) (pediatric)       Past Surgical History:   Procedure Laterality Date    CYST REMOVAL  1997    back x 2    LASIK  2001    SHOULDER ARTHROSCOPY      SHOULDER SURGERY Right 1/27/2020    RIGHT SHOULDER DIAGNOSTIC ARTHROSCOPY, EUA, MALENA, REVISION SUBACROMIAL DECOMPRESSION,  EXTENSIVE DEBRIDEMENT, REVISION ROTATOR CUFF REPAIR X 2, WITH COLLOGEN GRAFT AUGMENTATION performed by Karina Salgado MD at [address] [facility]  2002     Family History   Problem Relation Age of Onset    Heart Attack Father 76        smoker    Sleep Apnea Brother     Sleep Apnea Brother     Crohn's Disease Brother         healthy    Heart Attack Brother 50     Social History     Socioeconomic History    Marital status:      Spouse name: Pushpa Metz Number of children: 4    Years of education: None    Highest education level: None   Occupational History    Occupation: retired     Comment: Missionary   Tobacco Use    Smoking status: Never    Smokeless tobacco: Never   Vaping Use    Vaping Use: Never used   Substance and Sexual Activity    Alcohol use: Yes     Alcohol/week: 0.0 standard drinks     Types: 3 Glasses of wine per week     Comment: rare    Drug use: No    Sexual activity: Yes     Partners: Female     Comment: M ,4 kids 27 y,26,35,24 yo   Social History Narrative    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[employer]     4 children     9 G children      Social Determinants of Health     Financial Resource Strain: Low Risk     Difficulty of Paying Living Expenses: Not hard at all   Food Insecurity: No Food Insecurity    Worried About Running Out of Food in the Last Year: Never true    Ran Out of Food in the Last Year: Never true   Physical Activity: Sufficiently Active    Days of Exercise per Week: 6 days    Minutes of Exercise per Session: 40 min     Current Outpatient Medications   Medication Sig Dispense Refill    indapamide (LOZOL) 1.25 MG tablet Take 1 tablet by mouth every morning 30 tablet 0    atorvastatin (LIPITOR) 20 MG tablet TAKE ONE TABLET BY MOUTH DAILY 90 tablet 1    lisinopril (PRINIVIL;ZESTRIL) 20 MG tablet TAKE ONE TABLET BY MOUTH TWICE A  tablet 1    mometasone (ELOCON) 0.1 % cream Apply topically daily. 50 g 0    aspirin 81 MG chewable tablet Take 81 mg by mouth daily      Cetirizine HCl (ZYRTEC ALLERGY PO) Take by mouth       No current facility-administered medications for this visit. No Known Allergies      Objective:   Physical Exam   Vitals reviewed. Constitutional: He is oriented to person, place, and time. He appears well-developed and well-nourished. HENT:   Mouth/Throat: Oropharynx is clear and moist.   Eyes: Conjunctivae are normal. No scleral icterus. Neck: Normal range of motion. Neck supple. No JVD present.  Carotid bruit is not present. No thyromegaly present. Cardiovascular: Normal rate, regular rhythm, normal heart sounds and intact distal pulses. No murmur heard. Pulmonary/Chest: Effort normal and breath sounds normal. No respiratory distress. He has no wheezes. He has no rales. He exhibits no tenderness. Abdominal: Soft. Bowel sounds are normal. He exhibits no distension and no mass. There is no tenderness. There is no rebound and no guarding. Musculoskeletal: Normal range of motion. He exhibits no edema and  tenderness. at the left shoulder antonietta with internal rotation, no swelling muscle strength is 5/5   Neurological: He is alert and oriented to person, place, and time. Skin: No rash noted. Assessment:         Diagnosis Orders   1. Essential hypertension        2.  Hypercholesterolemia                Plan:      Pt is doing okay   Continue the same

## 2022-12-08 ENCOUNTER — TELEMEDICINE (OUTPATIENT)
Dept: PULMONOLOGY | Age: 72
End: 2022-12-08
Payer: MEDICARE

## 2022-12-08 DIAGNOSIS — G47.33 OBSTRUCTIVE SLEEP APNEA: Chronic | ICD-10-CM

## 2022-12-08 DIAGNOSIS — I10 ESSENTIAL HYPERTENSION: Chronic | ICD-10-CM

## 2022-12-08 PROCEDURE — G8427 DOCREV CUR MEDS BY ELIG CLIN: HCPCS | Performed by: INTERNAL MEDICINE

## 2022-12-08 PROCEDURE — 1123F ACP DISCUSS/DSCN MKR DOCD: CPT | Performed by: INTERNAL MEDICINE

## 2022-12-08 PROCEDURE — 99214 OFFICE O/P EST MOD 30 MIN: CPT | Performed by: INTERNAL MEDICINE

## 2022-12-08 PROCEDURE — 3017F COLORECTAL CA SCREEN DOC REV: CPT | Performed by: INTERNAL MEDICINE

## 2022-12-08 ASSESSMENT — SLEEP AND FATIGUE QUESTIONNAIRES
HOW LIKELY ARE YOU TO NOD OFF OR FALL ASLEEP WHILE WATCHING TV: 1
HOW LIKELY ARE YOU TO NOD OFF OR FALL ASLEEP WHILE SITTING AND READING: 1
HOW LIKELY ARE YOU TO NOD OFF OR FALL ASLEEP WHILE SITTING INACTIVE IN A PUBLIC PLACE: 0
HOW LIKELY ARE YOU TO NOD OFF OR FALL ASLEEP WHEN YOU ARE A PASSENGER IN A CAR FOR AN HOUR WITHOUT A BREAK: 0
HOW LIKELY ARE YOU TO NOD OFF OR FALL ASLEEP WHILE LYING DOWN TO REST IN THE AFTERNOON WHEN CIRCUMSTANCES PERMIT: 1
ESS TOTAL SCORE: 3
HOW LIKELY ARE YOU TO NOD OFF OR FALL ASLEEP WHILE SITTING AND TALKING TO SOMEONE: 0
HOW LIKELY ARE YOU TO NOD OFF OR FALL ASLEEP WHILE SITTING QUIETLY AFTER LUNCH WITHOUT ALCOHOL: 0
HOW LIKELY ARE YOU TO NOD OFF OR FALL ASLEEP IN A CAR, WHILE STOPPED FOR A FEW MINUTES IN TRAFFIC: 0

## 2022-12-08 NOTE — PROGRESS NOTES
Amy Morfin Beat [facility]  [address] [facility], [address] [facility]- [phone]   [facility] [address] Irvin Moyer. [address] [place] [address]. Adwoa Serrano 37 [phone]     Video Visit- Follow up    [address] [facility], was evaluated through a synchronous (real-time) audio-video  encounter. The patient (or guardian if applicable) is aware that this is a billable  service, which includes applicable co-pays. This Virtual Visit was conducted with  patient's (and/or legal guardian's) consent. The visit was conducted pursuant to  the emergency declaration under the [address] [facility], [address] [place] [address] [facility] authority and the [employer] and  [employer] General Act. Patient identification was verified,  and a caregiver was present when appropriate. The patient was located in a  state where the provider was licensed to provide care. Assessment/Plan:      1. Obstructive sleep apnea  Assessment & Plan:  Chronic-not fully Stable: Reviewed and analyzed results of physiologic download from patient's machine and reviewed with patient. Supplies and parts as needed for his machine. These are medically necessary. Limit caffeine use after 3pm. Based on the analyzed data will continue with current settings. Discussed that we can either follow the AHI over the next few months now that her stress is improved is hoping sleeping better or do an in lab titration to confirm the centrals are treated. He has a lot of his travel schedule for the next few months and would like to wait on doing any testing at this time. 2. Essential hypertension  Assessment & Plan:  Chronic- Stable. Discussed the importance of treating sleep apnea as part of the management of this disorder. Cont any meds per PCP and other physicians. Reviewed, analyzed, and documented physiologic data from patient's PAP machine.     This information was analyzed to assess complexity and medical decision making in regards to further testing and management. Diagnoses of Obstructive sleep apnea and Essential hypertension were pertinent to this visit. The chronic medical conditions listed are directly related to the primary diagnosis listed above. The management of the primary diagnosis affects the secondary diagnosis and vice versa. Subjective:     Patient ID: Lottie Zavala is a 71 y.o. male. Chief Complaint   Patient presents with    Sleep Apnea     Subjective   HPI:    Machine Modem/Download Info:  Compliance (hours/night): 7.2 hrs/night  % of nights >= 4 hrs: 90.6 %  Download AHI (/hour): 10.7 /HR  Average CPAP Pressure : 14 cmH2O APAP - Settings  Pressure Min: 13 cmH2O  Pressure Max: 20 cmH2O                 Comfort Settings  Humidity Level (0-8): 4  Heated Tubing (Yes/No): Yes  Flex/EPR (0-3): 3       With reduction pressure the central apneas really did not improve but he started feeling more short of breath. The pressure was then increased back up to 13 and his AHI started on 1011 and according to the machine mainly with central apneas. Patient does not have any cardiac history is not on any narcotics. Feels he sleeping well for the most part but has a lot of stress from work causes him to wake up a lot through the nighttime. Warren - Warren Sleepiness Score: 3    Current Outpatient Medications   Medication Instructions    aspirin 81 mg, Oral, DAILY    atorvastatin (LIPITOR) 20 MG tablet TAKE ONE TABLET BY MOUTH DAILY    Cetirizine HCl (ZYRTEC ALLERGY PO) Oral    indapamide (LOZOL) 1.25 mg, Oral, EVERY MORNING    lisinopril (PRINIVIL;ZESTRIL) 20 MG tablet TAKE ONE TABLET BY MOUTH TWICE A DAY    mometasone (ELOCON) 0.1 % cream Apply topically daily.         Electronically signed by Patricia Campbell MD on 12/8/2022 at 1:28 PM

## 2022-12-08 NOTE — LETTER
[facility]  [place] [address] [address] [place] [address]  Phone: [phone]  Fax: [phone]    Jackelin Brandt MD    December 8, 2022     Valeria Peters, [address] [address]    Patient: Gabriel Bingham   MR Number: [id number]   YOB: 1950   Date of Visit: 12/8/2022       Dear Valeria Peters: Thank you for referring Saul Mantilla to me for evaluation/treatment. Below are the relevant portions of my assessment and plan of care. 1. Obstructive sleep apnea  Assessment & Plan:  Chronic-not fully Stable: Reviewed and analyzed results of physiologic download from patient's machine and reviewed with patient. Supplies and parts as needed for his machine. These are medically necessary. Limit caffeine use after 3pm. Based on the analyzed data will continue with current settings. Discussed that we can either follow the AHI over the next few months now that her stress is improved is hoping sleeping better or do an in lab titration to confirm the centrals are treated. He has a lot of his travel schedule for the next few months and would like to wait on doing any testing at this time. 2. Essential hypertension  Assessment & Plan:  Chronic- Stable. Discussed the importance of treating sleep apnea as part of the management of this disorder. Cont any meds per PCP and other physicians. Reviewed, analyzed, and documented physiologic data from patient's PAP machine. This information was analyzed to assess complexity and medical decision making in regards to further testing and management. Diagnoses of Obstructive sleep apnea and Essential hypertension were pertinent to this visit. The chronic medical conditions listed are directly related to the primary diagnosis listed above. The management of the primary diagnosis affects the secondary diagnosis and vice versa. If you have questions, please do not hesitate to call me.  I look forward to following Gilmar Moseley along with you.     Sincerely,      Davi Torres MD

## 2022-12-08 NOTE — ASSESSMENT & PLAN NOTE
Chronic-not fully Stable: Reviewed and analyzed results of physiologic download from patient's machine and reviewed with patient. Supplies and parts as needed for his machine. These are medically necessary. Limit caffeine use after 3pm. Based on the analyzed data will continue with current settings. Discussed that we can either follow the AHI over the next few months now that her stress is improved is hoping sleeping better or do an in lab titration to confirm the centrals are treated. He has a lot of his travel schedule for the next few months and would like to wait on doing any testing at this time.

## 2023-01-26 RX ORDER — ATORVASTATIN CALCIUM 20 MG/1
TABLET, FILM COATED ORAL
Qty: 90 TABLET | Refills: 1 | Status: SHIPPED | OUTPATIENT
Start: 2023-01-26

## 2023-01-26 NOTE — TELEPHONE ENCOUNTER
Medication:   Requested Prescriptions     Pending Prescriptions Disp Refills    atorvastatin (LIPITOR) 20 MG tablet 90 tablet 1     Sig: TAKE ONE TABLET BY MOUTH DAILY     Last Filled:  10/21/22    Last appt: 12/5/2022   Next appt: Visit date not found    Last Lipid:   Lab Results   Component Value Date/Time    CHOL 150 10/21/2022 09:10 AM    TRIG 49 10/21/2022 09:10 AM    HDL 67 10/21/2022 09:10 AM    LDLCALC 73 10/21/2022 09:10 AM

## 2023-02-07 ENCOUNTER — PATIENT MESSAGE (OUTPATIENT)
Dept: PRIMARY CARE CLINIC | Age: 73
End: 2023-02-07

## 2023-02-07 NOTE — TELEPHONE ENCOUNTER
Medication:   Requested Prescriptions     Pending Prescriptions Disp Refills    lisinopril (PRINIVIL;ZESTRIL) 20 MG tablet 60 tablet 0     Sig: TAKE ONE TABLET BY MOUTH TWICE A DAY     Last Filled:  8/29/22    Last appt: 12/5/2022   Next appt: Visit date not found    Last Labs DM: No results found for: LABA1C  Last Lipid:   Lab Results   Component Value Date/Time    CHOL 150 10/21/2022 09:10 AM    TRIG 49 10/21/2022 09:10 AM    HDL 67 10/21/2022 09:10 AM    LDLCALC 73 10/21/2022 09:10 AM     Last PSA:   Lab Results   Component Value Date/Time    PSA 0.88 04/24/2019 08:10 AM     Last Thyroid:   Lab Results   Component Value Date/Time    TSH 1.21 10/21/2022 09:10 AM

## 2023-02-07 NOTE — TELEPHONE ENCOUNTER
From: Judy Mancera [facility]  To: Dr. Lise Diop: 2/7/2023 3:31 PM EST  Subject: Lisinopril    I will be leaving the country this Saturday, Feb. 11 and returning March 13 ([facility] - one week, [facility] - three weeks). I will run short of Lisinopril by 9 days. Would it be possible to get a partial or full prescription?  Thank you, Yaritza Varela

## 2023-02-08 RX ORDER — LISINOPRIL 20 MG/1
TABLET ORAL
Qty: 60 TABLET | Refills: 0 | Status: SHIPPED | OUTPATIENT
Start: 2023-02-08

## 2023-02-23 RX ORDER — LISINOPRIL 20 MG/1
TABLET ORAL
Qty: 180 TABLET | Refills: 0 | Status: SHIPPED | OUTPATIENT
Start: 2023-02-23

## 2023-02-23 NOTE — TELEPHONE ENCOUNTER
Medication:   Requested Prescriptions     Pending Prescriptions Disp Refills    lisinopril (PRINIVIL;ZESTRIL) 20 MG tablet [Pharmacy Med Name: LISINOPRIL 20 MG TABLET] 180 tablet      Sig: TAKE ONE TABLET BY MOUTH TWICE A DAY     Last Filled:  2/8/2023    Last appt: 12/5/2022   Next appt: Visit date not found    Last Labs DM: No results found for: LABA1C  Last Lipid:   Lab Results   Component Value Date/Time    CHOL 150 10/21/2022 09:10 AM    TRIG 49 10/21/2022 09:10 AM    HDL 67 10/21/2022 09:10 AM    LDLCALC 73 10/21/2022 09:10 AM     Last PSA:   Lab Results   Component Value Date/Time    PSA 0.88 04/24/2019 08:10 AM     Last Thyroid:   Lab Results   Component Value Date/Time    TSH 1.21 10/21/2022 09:10 AM

## 2023-04-20 ENCOUNTER — TELEPHONE (OUTPATIENT)
Dept: PULMONOLOGY | Age: 73
End: 2023-04-20

## 2023-04-20 NOTE — TELEPHONE ENCOUNTER
Patient left message to reschedule appointment that he missed. Called patient back and left message to call office to reschedule.

## 2023-05-17 ENCOUNTER — HOSPITAL ENCOUNTER (OUTPATIENT)
Dept: SLEEP CENTER | Age: 73
Discharge: HOME | End: 2023-05-17
Payer: MEDICARE

## 2023-05-17 DIAGNOSIS — G47.33 OBSTRUCTIVE SLEEP APNEA: Chronic | ICD-10-CM

## 2023-05-17 PROCEDURE — 95811 POLYSOM 6/>YRS CPAP 4/> PARM: CPT

## 2023-05-22 ENCOUNTER — TELEPHONE (OUTPATIENT)
Dept: PULMONOLOGY | Age: 73
End: 2023-05-22

## 2023-05-22 NOTE — PROGRESS NOTES
Order Signed Date: 23    Electronically signed by Leslee Schmitz MD on 2023 at 11:26 AM    Lexi Begin Stuk  1950  [address] [address] [phone] (home)   [phone] (mobile)      Insurance Info (confirm with patient if correct):  Payer/Plan Subscr  Sex Relation Sub.  Ins. ID Effective Group Num

## 2023-05-22 NOTE — TELEPHONE ENCOUNTER
Spoke with pt to review titration results. Order to be sent to [facility] per pt. Pt asking to try a FFM due to mouth opening.  Pt to schedule f/u

## 2023-06-08 RX ORDER — LISINOPRIL 20 MG/1
TABLET ORAL
Qty: 180 TABLET | Refills: 0 | Status: SHIPPED | OUTPATIENT
Start: 2023-06-08

## 2023-06-08 NOTE — TELEPHONE ENCOUNTER
Medication:   Requested Prescriptions     Pending Prescriptions Disp Refills    lisinopril (PRINIVIL;ZESTRIL) 20 MG tablet [Pharmacy Med Name: LISINOPRIL 20 MG TABLET] 180 tablet 0     Sig: TAKE ONE TABLET BY MOUTH TWICE A DAY     Last Filled:  02/23/2023    Last appt: 12/5/2022   Next appt: Visit date not found    Last OARRS: No flowsheet data found.

## 2023-06-19 ENCOUNTER — TELEPHONE (OUTPATIENT)
Dept: PRIMARY CARE CLINIC | Age: 73
End: 2023-06-19

## 2023-06-19 DIAGNOSIS — R05.1 ACUTE COUGH: Primary | ICD-10-CM

## 2023-06-19 RX ORDER — ALBUTEROL SULFATE 90 UG/1
2 AEROSOL, METERED RESPIRATORY (INHALATION) 4 TIMES DAILY
Qty: 18 G | Refills: 0 | Status: SHIPPED | OUTPATIENT
Start: 2023-06-19

## 2023-06-19 RX ORDER — BENZONATATE 200 MG/1
200 CAPSULE ORAL 3 TIMES DAILY PRN
Qty: 30 CAPSULE | Refills: 0 | Status: SHIPPED | OUTPATIENT
Start: 2023-06-19 | End: 2023-06-26

## 2023-06-19 NOTE — TELEPHONE ENCOUNTER
Spoke with the wife  Order for chest x-ray/COVID is put in his chart he will come tomorrow  Medication sent to his pharmacy  If not better by Tuesday Wednesday need to be seen Thursday

## 2023-06-19 NOTE — TELEPHONE ENCOUNTER
Pts wife called and stated he is not getting better, he is hallucinating, he cant keep his fever down and he has a cough that is getting worse

## 2023-06-20 ENCOUNTER — HOSPITAL ENCOUNTER (OUTPATIENT)
Dept: GENERAL RADIOLOGY | Age: 73
Discharge: HOME | End: 2023-06-20
Payer: MEDICARE

## 2023-06-20 DIAGNOSIS — R05.1 ACUTE COUGH: ICD-10-CM

## 2023-06-20 PROCEDURE — 71046 X-RAY EXAM CHEST 2 VIEWS: CPT

## 2023-06-20 RX ORDER — DOXYCYCLINE HYCLATE 100 MG
100 TABLET ORAL 2 TIMES DAILY
Qty: 20 TABLET | Refills: 0 | Status: SHIPPED | OUTPATIENT
Start: 2023-06-20 | End: 2023-06-30

## 2023-06-21 LAB — SARS-COV-2 RNA RESP QL NAA+PROBE: NOT DETECTED

## 2023-07-05 NOTE — TELEPHONE ENCOUNTER
Medication:   Requested Prescriptions     Pending Prescriptions Disp Refills    indapamide (LOZOL) 1.25 MG tablet [Pharmacy Med Name: INDAPAMIDE 1.25 MG TABLET] 30 tablet      Sig: TAKE ONE TABLET BY MOUTH EVERY MORNING     Last Filled:  12/05/22    Last appt: 6/15/2023   Next appt: Visit date not found    Last OARRS: No flowsheet data found.

## 2023-07-06 RX ORDER — INDAPAMIDE 1.25 MG/1
TABLET, FILM COATED ORAL
Qty: 30 TABLET | Refills: 0 | Status: SHIPPED | OUTPATIENT
Start: 2023-07-06

## 2023-07-31 ENCOUNTER — TELEPHONE (OUTPATIENT)
Dept: PULMONOLOGY | Age: 73
End: 2023-07-31

## 2023-07-31 NOTE — TELEPHONE ENCOUNTER
Pt called in stating that he was returning a call from elsa. Informed pt that we do not have a justo who works in our office. He stated that someone just called. Looked and did not see documentation of anyone calling pt.

## 2023-08-02 PROBLEM — E66.811 CLASS 1 OBESITY DUE TO EXCESS CALORIES WITH SERIOUS COMORBIDITY AND BODY MASS INDEX (BMI) OF 34.0 TO 34.9 IN ADULT: Chronic | Status: RESOLVED | Noted: 2019-10-10 | Resolved: 2023-08-02

## 2023-08-02 PROBLEM — E66.09 CLASS 1 OBESITY DUE TO EXCESS CALORIES WITH SERIOUS COMORBIDITY AND BODY MASS INDEX (BMI) OF 34.0 TO 34.9 IN ADULT: Chronic | Status: RESOLVED | Noted: 2019-10-10 | Resolved: 2023-08-02

## 2023-08-16 NOTE — TELEPHONE ENCOUNTER
Medication:   Requested Prescriptions     Pending Prescriptions Disp Refills    indapamide (LOZOL) 1.25 MG tablet 30 tablet 0     Sig: Take 1 tablet by mouth every morning     Last Filled:  7.6.23    Last appt: 6/15/2023   Next appt: Visit date not found    Last OARRS: No flowsheet data found.

## 2023-08-17 RX ORDER — INDAPAMIDE 1.25 MG/1
1.25 TABLET, FILM COATED ORAL EVERY MORNING
Qty: 30 TABLET | Refills: 2 | Status: SHIPPED | OUTPATIENT
Start: 2023-08-17

## 2023-09-06 NOTE — TELEPHONE ENCOUNTER
Medication:   Requested Prescriptions     Pending Prescriptions Disp Refills    lisinopril (PRINIVIL;ZESTRIL) 20 MG tablet [Pharmacy Med Name: LISINOPRIL 20 MG TABLET] 180 tablet 0     Sig: TAKE 1 TABLET BY MOUTH TWICE A DAY     Last Filled:  06/08/2023    Last appt: 6/15/2023   Next appt: Visit date not found    Last OARRS: No flowsheet data found.

## 2023-09-07 RX ORDER — LISINOPRIL 20 MG/1
TABLET ORAL
Qty: 180 TABLET | Refills: 0 | Status: SHIPPED | OUTPATIENT
Start: 2023-09-07

## 2023-10-25 NOTE — TELEPHONE ENCOUNTER
Medication:   Requested Prescriptions     Pending Prescriptions Disp Refills    indapamide (LOZOL) 1.25 MG tablet 30 tablet 2     Sig: Take 1 tablet by mouth every morning     Last Filled:  8.17.23    Last appt: 6/15/2023   Next appt: my chart message sent to schedule appointment     Last OARRS:        No data to display

## 2023-10-26 RX ORDER — ATORVASTATIN CALCIUM 20 MG/1
TABLET, FILM COATED ORAL
Qty: 90 TABLET | Refills: 1 | Status: SHIPPED | OUTPATIENT
Start: 2023-10-26

## 2023-10-26 RX ORDER — INDAPAMIDE 1.25 MG/1
1.25 TABLET ORAL EVERY MORNING
Qty: 30 TABLET | Refills: 2 | Status: SHIPPED | OUTPATIENT
Start: 2023-10-26

## 2023-10-26 NOTE — TELEPHONE ENCOUNTER
Medication:   Requested Prescriptions     Pending Prescriptions Disp Refills    atorvastatin (LIPITOR) 20 MG tablet [Pharmacy Med Name: ATORVASTATIN 20 MG TABLET] 90 tablet 1     Sig: TAKE ONE TABLET BY MOUTH DAILY     Last Filled:  1/26/2023    Last appt: 6/15/2023   Next appt: Visit date not found    Last Lipid:   Lab Results   Component Value Date/Time    CHOL 150 10/21/2022 09:10 AM    TRIG 49 10/21/2022 09:10 AM    HDL 67 10/21/2022 09:10 AM    LDLCALC 73 10/21/2022 09:10 AM

## 2023-11-21 RX ORDER — INDAPAMIDE 1.25 MG/1
1.25 TABLET ORAL EVERY MORNING
Qty: 30 TABLET | Refills: 0 | Status: SHIPPED | OUTPATIENT
Start: 2023-11-21

## 2023-11-21 NOTE — TELEPHONE ENCOUNTER
Medication:   Requested Prescriptions     Pending Prescriptions Disp Refills    indapamide (LOZOL) 1.25 MG tablet 30 tablet 2     Sig: Take 1 tablet by mouth every morning     Last Filled:  10/26/50197    Last appt: 6/15/2023   Next appt: 11/27/2023    Last OARRS:        No data to display

## 2023-11-26 SDOH — HEALTH STABILITY: PHYSICAL HEALTH: ON AVERAGE, HOW MANY DAYS PER WEEK DO YOU ENGAGE IN MODERATE TO STRENUOUS EXERCISE (LIKE A BRISK WALK)?: 5 DAYS

## 2023-11-26 ASSESSMENT — PATIENT HEALTH QUESTIONNAIRE - PHQ9
SUM OF ALL RESPONSES TO PHQ9 QUESTIONS 1 & 2: 1
SUM OF ALL RESPONSES TO PHQ QUESTIONS 1-9: 1
SUM OF ALL RESPONSES TO PHQ QUESTIONS 1-9: 1
1. LITTLE INTEREST OR PLEASURE IN DOING THINGS: 0
SUM OF ALL RESPONSES TO PHQ QUESTIONS 1-9: 1
2. FEELING DOWN, DEPRESSED OR HOPELESS: 1
SUM OF ALL RESPONSES TO PHQ QUESTIONS 1-9: 1

## 2023-11-26 ASSESSMENT — LIFESTYLE VARIABLES
HOW MANY STANDARD DRINKS CONTAINING ALCOHOL DO YOU HAVE ON A TYPICAL DAY: 1 OR 2
HOW OFTEN DO YOU HAVE A DRINK CONTAINING ALCOHOL: 3
HOW OFTEN DO YOU HAVE A DRINK CONTAINING ALCOHOL: 2-4 TIMES A MONTH
HOW MANY STANDARD DRINKS CONTAINING ALCOHOL DO YOU HAVE ON A TYPICAL DAY: 1
HOW OFTEN DO YOU HAVE SIX OR MORE DRINKS ON ONE OCCASION: 1

## 2023-11-27 ENCOUNTER — OFFICE VISIT (OUTPATIENT)
Dept: PRIMARY CARE CLINIC | Age: 73
End: 2023-11-27
Payer: MEDICARE

## 2023-11-27 VITALS
TEMPERATURE: 97.2 F | BODY MASS INDEX: 31.29 KG/M2 | HEART RATE: 61 BPM | HEIGHT: 72 IN | DIASTOLIC BLOOD PRESSURE: 80 MMHG | SYSTOLIC BLOOD PRESSURE: 124 MMHG | OXYGEN SATURATION: 99 % | WEIGHT: 231 LBS

## 2023-11-27 DIAGNOSIS — I10 ESSENTIAL HYPERTENSION: ICD-10-CM

## 2023-11-27 DIAGNOSIS — Z00.00 MEDICARE ANNUAL WELLNESS VISIT, SUBSEQUENT: Primary | ICD-10-CM

## 2023-11-27 LAB
ALBUMIN SERPL-MCNC: 4.1 G/DL (ref 3.4–5)
ALP SERPL-CCNC: 30 U/L (ref 40–129)
ALT SERPL-CCNC: 9 U/L (ref 10–40)
ANION GAP SERPL CALCULATED.3IONS-SCNC: 10 MMOL/L (ref 3–16)
AST SERPL-CCNC: 18 U/L (ref 15–37)
BASOPHILS # BLD: 0.1 K/UL (ref 0–0.2)
BASOPHILS NFR BLD: 1 %
BILIRUB DIRECT SERPL-MCNC: <0.2 MG/DL (ref 0–0.3)
BILIRUB INDIRECT SERPL-MCNC: ABNORMAL MG/DL (ref 0–1)
BILIRUB SERPL-MCNC: 1 MG/DL (ref 0–1)
BUN SERPL-MCNC: 16 MG/DL (ref 7–20)
CALCIUM SERPL-MCNC: 9.5 MG/DL (ref 8.3–10.6)
CHLORIDE SERPL-SCNC: 103 MMOL/L (ref 99–110)
CHOLEST SERPL-MCNC: 153 MG/DL (ref 0–199)
CO2 SERPL-SCNC: 26 MMOL/L (ref 21–32)
CREAT SERPL-MCNC: 0.9 MG/DL (ref 0.8–1.3)
DEPRECATED RDW RBC AUTO: 13.7 % (ref 12.4–15.4)
EOSINOPHIL # BLD: 0.9 K/UL (ref 0–0.6)
EOSINOPHIL NFR BLD: 13.7 %
GFR SERPLBLD CREATININE-BSD FMLA CKD-EPI: >60 ML/MIN/{1.73_M2}
GLUCOSE SERPL-MCNC: 95 MG/DL (ref 70–99)
HCT VFR BLD AUTO: 41.5 % (ref 40.5–52.5)
HDLC SERPL-MCNC: 64 MG/DL (ref 40–60)
HGB BLD-MCNC: 14.1 G/DL (ref 13.5–17.5)
LDLC SERPL CALC-MCNC: 70 MG/DL
LYMPHOCYTES # BLD: 1.8 K/UL (ref 1–5.1)
LYMPHOCYTES NFR BLD: 26.8 %
MCH RBC QN AUTO: 30.1 PG (ref 26–34)
MCHC RBC AUTO-ENTMCNC: 34 G/DL (ref 31–36)
MCV RBC AUTO: 88.4 FL (ref 80–100)
MONOCYTES # BLD: 0.6 K/UL (ref 0–1.3)
MONOCYTES NFR BLD: 9.1 %
NEUTROPHILS # BLD: 3.3 K/UL (ref 1.7–7.7)
NEUTROPHILS NFR BLD: 49.4 %
PLATELET # BLD AUTO: 202 K/UL (ref 135–450)
PMV BLD AUTO: 7.8 FL (ref 5–10.5)
POTASSIUM SERPL-SCNC: 3.8 MMOL/L (ref 3.5–5.1)
PROT SERPL-MCNC: 6.5 G/DL (ref 6.4–8.2)
RBC # BLD AUTO: 4.7 M/UL (ref 4.2–5.9)
SODIUM SERPL-SCNC: 139 MMOL/L (ref 136–145)
TRIGL SERPL-MCNC: 96 MG/DL (ref 0–150)
VLDLC SERPL CALC-MCNC: 19 MG/DL
WBC # BLD AUTO: 6.6 K/UL (ref 4–11)

## 2023-11-27 PROCEDURE — 3017F COLORECTAL CA SCREEN DOC REV: CPT | Performed by: FAMILY MEDICINE

## 2023-11-27 PROCEDURE — 3074F SYST BP LT 130 MM HG: CPT | Performed by: FAMILY MEDICINE

## 2023-11-27 PROCEDURE — G8484 FLU IMMUNIZE NO ADMIN: HCPCS | Performed by: FAMILY MEDICINE

## 2023-11-27 PROCEDURE — G0439 PPPS, SUBSEQ VISIT: HCPCS | Performed by: FAMILY MEDICINE

## 2023-11-27 PROCEDURE — 3079F DIAST BP 80-89 MM HG: CPT | Performed by: FAMILY MEDICINE

## 2023-11-27 PROCEDURE — 1123F ACP DISCUSS/DSCN MKR DOCD: CPT | Performed by: FAMILY MEDICINE

## 2023-11-27 ASSESSMENT — PATIENT HEALTH QUESTIONNAIRE - PHQ9
DEPRESSION UNABLE TO ASSESS: URGENT/EMERGENT SITUATION
1. LITTLE INTEREST OR PLEASURE IN DOING THINGS: 1
SUM OF ALL RESPONSES TO PHQ QUESTIONS 1-9: 2
SUM OF ALL RESPONSES TO PHQ9 QUESTIONS 1 & 2: 2
2. FEELING DOWN, DEPRESSED OR HOPELESS: 1
SUM OF ALL RESPONSES TO PHQ QUESTIONS 1-9: 2

## 2023-11-27 ASSESSMENT — LIFESTYLE VARIABLES
HOW OFTEN DO YOU HAVE A DRINK CONTAINING ALCOHOL: 2-4 TIMES A MONTH
HOW MANY STANDARD DRINKS CONTAINING ALCOHOL DO YOU HAVE ON A TYPICAL DAY: 1 OR 2

## 2023-11-27 NOTE — PATIENT INSTRUCTIONS
Advance Directives: Care Instructions  Overview  An advance directive is a legal way to state your wishes at the end of your life. It tells your family and your doctor what to do if you can't say what you want. There are two main types of advance directives. You can change them any time your wishes change. Living will. This form tells your family and your doctor your wishes about life support and other treatment. The form is also called a declaration. Medical power of . This form lets you name a person to make treatment decisions for you when you can't speak for yourself. This person is called a health care agent (health care proxy, health care surrogate). The form is also called a durable power of  for health care. If you do not have an advance directive, decisions about your medical care may be made by a family member, or by a doctor or a  who doesn't know you. It may help to think of an advance directive as a gift to the people who care for you. If you have one, they won't have to make tough decisions by themselves. For more information, including forms for your state, see the [address] [place] [address] website (www.caringinfo.org/planning/advance-directives/). Follow-up care is a key part of your treatment and safety. Be sure to make and go to all appointments, and call your doctor if you are having problems. It's also a good idea to know your test results and keep a list of the medicines you take. What should you include in an advance directive? Many states have a unique advance directive form. (It may ask you to address specific issues.) Or you might use a universal form that's approved by many states. If your form doesn't tell you what to address, it may be hard to know what to include in your advance directive. Use the questions below to help you get started. Who do you want to make decisions about your medical care if you are not able to?   What life-support measures do you want if you

## 2023-11-28 LAB — TSH SERPL DL<=0.005 MIU/L-ACNC: 1.46 UIU/ML (ref 0.27–4.2)

## 2023-12-07 RX ORDER — LISINOPRIL 20 MG/1
TABLET ORAL
Qty: 180 TABLET | Refills: 0 | Status: SHIPPED | OUTPATIENT
Start: 2023-12-07

## 2023-12-07 NOTE — TELEPHONE ENCOUNTER
Medication:   Requested Prescriptions     Pending Prescriptions Disp Refills    lisinopril (PRINIVIL;ZESTRIL) 20 MG tablet [Pharmacy Med Name: LISINOPRIL 20 MG TABLET] 180 tablet 0     Sig: TAKE 1 TABLET BY MOUTH TWICE A DAY     Last Filled:  09/07/2023    Last appt: 11/27/2023   Next appt: Visit date not found    Last OARRS:        No data to display

## 2024-03-14 ENCOUNTER — TELEPHONE (OUTPATIENT)
Dept: SLEEP CENTER | Age: 74
End: 2024-03-14

## 2024-03-14 NOTE — TELEPHONE ENCOUNTER
Called to schedule an bipap per Mariusz     Pt's spouse picked up - she will have him call me back when he's back home     Medicare insurance

## 2024-03-20 ENCOUNTER — HOSPITAL ENCOUNTER (OUTPATIENT)
Dept: SLEEP CENTER | Age: 74
Discharge: HOME | End: 2024-03-20
Payer: MEDICARE

## 2024-03-20 DIAGNOSIS — G47.33 OBSTRUCTIVE SLEEP APNEA: Chronic | ICD-10-CM

## 2024-03-20 PROCEDURE — 95811 POLYSOM 6/>YRS CPAP 4/> PARM: CPT

## 2024-03-25 ENCOUNTER — TELEPHONE (OUTPATIENT)
Dept: PULMONOLOGY | Age: 74
End: 2024-03-25

## 2024-03-26 ENCOUNTER — TELEPHONE (OUTPATIENT)
Dept: PULMONOLOGY | Age: 74
End: 2024-03-26

## 2024-03-27 ENCOUNTER — TELEPHONE (OUTPATIENT)
Dept: SLEEP CENTER | Age: 74
End: 2024-03-27

## 2024-04-02 ENCOUNTER — HOSPITAL ENCOUNTER (OUTPATIENT)
Dept: SLEEP CENTER | Age: 74
Discharge: HOME | End: 2024-04-02
Payer: MEDICARE

## 2024-04-02 DIAGNOSIS — G47.31 COMPLEX SLEEP APNEA SYNDROME: ICD-10-CM

## 2024-04-02 PROCEDURE — 95811 POLYSOM 6/>YRS CPAP 4/> PARM: CPT

## 2024-04-02 RX ORDER — LISINOPRIL 20 MG/1
20 TABLET ORAL 2 TIMES DAILY
Qty: 180 TABLET | Refills: 0 | Status: SHIPPED | OUTPATIENT
Start: 2024-04-02

## 2024-04-02 NOTE — TELEPHONE ENCOUNTER
MEDICARE WELLNESS VISIT NOTE    HISTORY OF PRESENT ILLNESS:   Paty presents for her Welcome to Medicare Wellness Visit.   She has complaints or concerns which is outlined separately.      Patient Care Team:  Nikita Cassidy DO as PCP - General (Family Medicine)        Patient Active Problem List   Diagnosis    Mild intermittent asthma without complication    Dyslipidemia    Prediabetes         History reviewed. No pertinent past medical history.      Past Surgical History:   Procedure Laterality Date    Arthrotomy/explore/treat knee joint Right      section, low transverse      Hysterectomy  2002    Oophorectomy           Social History     Tobacco Use    Smoking status: Former     Current packs/day: 0.00     Types: Cigarettes     Quit date:      Years since quittin.0    Smokeless tobacco: Never   Vaping Use    Vaping Use: never used   Substance Use Topics    Alcohol use: Yes     Comment: Yes, socially    Drug use: Never     Drug use:    Drug Use:    Never           Family History   Problem Relation Age of Onset    Diabetes Father     Depression Daughter     Mental retardation Maternal Aunt     Learning disabilities Maternal Aunt     Myocardial Infarction Maternal Uncle        Current Outpatient Medications   Medication Sig Dispense Refill    albuterol 108 (90 Base) MCG/ACT inhaler Inhale 2 puffs into the lungs every 6 hours as needed for Shortness of Breath or Wheezing. Please dispense a spacer 18 g 2    meloxicam (MOBIC) 15 MG tablet TAKE 1 TABLET BY MOUTH DAILY 30 tablet 2    montelukast (SINGULAIR) 10 MG tablet Take 1 tablet by mouth nightly. 90 tablet 1    ipratropium (ATROVENT) 0.06 % nasal spray Spray 2 sprays in each nostril 2 times daily as needed for Rhinitis. 15 mL 5    Cetirizine HCl (ZYRTEC PO)       omeprazole (PriLOSEC) 40 MG capsule Take 1 capsule by mouth every evening. 30 capsule 3    Spacer/Aero-Holding Chambers (Valved Holding Chamber) Device USE AS DIRECTED       Medication:   Requested Prescriptions     Pending Prescriptions Disp Refills    lisinopril (PRINIVIL;ZESTRIL) 20 MG tablet 180 tablet 0     Sig: Take 1 tablet by mouth 2 times daily     Last filled: 12/7/23  Last appt: 11/27/2023   Next appt: Visit date not found  [place] and will be short pills  Last OARRS:        No data to display                triamcinolone (KENALOG) 0.025 % ointment Apply 1 application topically 2 times daily. 30 g 1    olopatadine (PATADAY) 0.2 % ophthalmic solution Place 1 drop into both eyes daily. 2.5 mL 0    moxifloxacin (VIGAMOX) 0.5 % ophthalmic solution Place 1 drop into both eyes in the morning and 1 drop at noon and 1 drop in the evening. 3 mL 0     No current facility-administered medications for this visit.        The following items on the Medicare Health Risk Assessment were found to be positive  1.) Do you have an Advance directive, living will, or power of  for health care document that contains your wishes for end of life care?: No     5.) Do you do moderate to strenuous exercise (brisk walk) for about 20 minutes for 3 or more days per week?: No, I usually do not exercise this much     6 a.) How many servings of Fruits and Vegetables do you have each day ( 1 serving = 1 piece of fruit, 1/2 cup fruits or vegetables): 1 per day     6 c.) How many servings of Fried or High Fat Foods do you have each day (1 serving = 1 Mendoza, French Fries, chips, doughnut, fried chicken/fish): 3 per day     7c.) Do you worry about falling?: Yes     14.) During the past 4 weeks, was someone available to help if you needed and wanted help?: No, not at all         Vision and Hearing screens: Vision Screening - Comments:: WEARS GLASSES / SEE'S EYE     Advance care planning documents on file - no     Cognitive/Functional Status: no evidence of cognitive dysfunction by direct observation    Opioid Review: Paty is not taking opioid medications.    Recent PHQ 2/9 Score:    PHQ 2:  PHQ 2 Score Adult PHQ 2 Score Adult PHQ 2 Interpretation Little interest or pleasure in activity?   1/22/2024   8:39 AM 0 No further screening needed 0       PHQ 9:       DEPRESSION ASSESSMENT/PLAN:  Depression screening is negative no further plan needed.     Body mass index is 44.03 kg/m².    BMI FOLLOW-UP/PLAN:  Patient is obese.    Journal food intake daily,  Join health club, Recommend nutrition support group (i.e. Weight Watchers, etc.) , 30 minutes of physical activity a day, See patient education in AVS, Follow-up in 6 months, Caloric restriction, and Low carbohydrate diet       Needed Screening/Treatment:   Annual mammogram , Colorectal cancer screening , Bone density, and Immunizations reviewed and patient needs: COVID-19, Pneumococcal 20 , Herpes Zoster, and Tetanus  Needed follow up:  None    See orders.   See Patient Instructions section.   Return in about 1 year (around 1/22/2025) for Medicare Wellness Visit.     Chief Complaint   Patient presents with    Medicare Wellness Visit     SUBJECTIVE:  Paty Kimbrough is a 68 year old female who presents for Medicare wellness visit and follow-up of multiple medical conditions.    Historian: Self.     Exercise: Swimming and gym workout at home.  Nutrition: Bread, spaghetti, noodles, lasagna, stuffed shells, avenacin and vegetables like corn, green beans, asparagus, saucy food like skinless chicken with barbecue sauce, ketchup, mustard, mayonnaise.  1. Breakfast: Peanut butter sandwich and piece of fruit.  2. Lunch: Soup and Tuna fish or salad.  3. Dinner: Hamburgers, pork chops, fish.  4. Snacks: Raisins, nuts, popcorn, cookies, candy bars.  5. Drinks: Water, soda, tea, less milk.  Caffeine: As above.  Employment: Works at [employer].  Sleep: Disturbed sleep and has a history of snoring. Does not feel refreshed after sleep.    - She has seen ENT last year and her exam was normal. She feels that it is due to her weight.  - She wants to lose weight and has issues with low energy levels.    Additional comments  - She states that she was given some home exercises to be done at home when she went to physical therapy for the knee pain.    Asthma  - She states that she still has some wheezing, but it is better.   - Using Albuterol inhaler as needed.    PROBLEM LIST:    Patient Active Problem List   Diagnosis     Mild intermittent asthma without complication    Dyslipidemia    Prediabetes       MEDICATIONS:    Current Outpatient Medications   Medication Sig Dispense Refill    albuterol 108 (90 Base) MCG/ACT inhaler Inhale 2 puffs into the lungs every 6 hours as needed for Shortness of Breath or Wheezing. Please dispense a spacer 18 g 2    meloxicam (MOBIC) 15 MG tablet TAKE 1 TABLET BY MOUTH DAILY 30 tablet 2    montelukast (SINGULAIR) 10 MG tablet Take 1 tablet by mouth nightly. 90 tablet 1    ipratropium (ATROVENT) 0.06 % nasal spray Spray 2 sprays in each nostril 2 times daily as needed for Rhinitis. 15 mL 5    Cetirizine HCl (ZYRTEC PO)       omeprazole (PriLOSEC) 40 MG capsule Take 1 capsule by mouth every evening. 30 capsule 3    Spacer/Aero-Holding Chambers (Valved Holding Chamber) Device USE AS DIRECTED      triamcinolone (KENALOG) 0.025 % ointment Apply 1 application topically 2 times daily. 30 g 1    olopatadine (PATADAY) 0.2 % ophthalmic solution Place 1 drop into both eyes daily. 2.5 mL 0    moxifloxacin (VIGAMOX) 0.5 % ophthalmic solution Place 1 drop into both eyes in the morning and 1 drop at noon and 1 drop in the evening. 3 mL 0     No current facility-administered medications for this visit.       ALLERGIES:    ALLERGIES:  No Known Allergies    PAST MEDICAL HISTORY:    History reviewed. No pertinent past medical history.    SURGICAL HISTORY:    Past Surgical History:   Procedure Laterality Date    Arthrotomy/explore/treat knee joint Right      section, low transverse      Hysterectomy  2002    Oophorectomy         FAMILY HISTORY:    Family History   Problem Relation Age of Onset    Diabetes Father     Depression Daughter     Mental retardation Maternal Aunt     Learning disabilities Maternal Aunt     Myocardial Infarction Maternal Uncle        SOCIAL HISTORY:       Social History     Tobacco Use    Smoking status: Former     Current packs/day: 0.00     Types: Cigarettes     Quit date:       Years since quittin.0    Smokeless tobacco: Never   Vaping Use    Vaping Use: never used   Substance Use Topics    Alcohol use: Yes     Comment: Yes, socially    Drug use: Never     REVIEW OF SYSTEMS:  Constitutional: Negative for fever, chills, or fatigue.  Eyes: Negative for visual changes.  ENT: Negative for rhinorrhea, ear pain or sore throat.  Respiratory: Negative for cough or shortness of breath.    Cardiovascular: Negative for chest pain, palpitations.   Gastrointestinal: Negative for nausea, vomiting, diarrhea or abdominal pain.   Genitourinary: Negative for dysuria, urgency, frequency.  Extremities:  Negative for joint swelling or joint pain.  Neurologic:  Negative for headache.  Endocrine: Negative for heat or cold intolerance, weight loss. Positive for weight gain.  Psychiatric: Negative for anxiety, depression.    OBJECTIVE:  Vital Signs:  Visit Vitals  /78   Pulse 76   Ht 5' 8\" (1.727 m)   Wt 131.4 kg (289 lb 9.6 oz)   SpO2 96%   BMI 44.03 kg/m²     General:   Alert, cooperative, conversive in no acute distress. Morbidly obese.  Skin:  Warm and dry without rash.    Head:  Normocephalic-atraumatic.   Neck:  Trachea is midline. No adenopathy.  Normal thyroid without mass or tenderness.  Eyes:  Normal conjunctivae and sclerae.  PERRL (Pupils equal, round, reactive to light).  EOMI (Extraocular movements intact).  ENT:  Mucous membranes are moist.  Normal tympanic membranes and external auditory canals bilaterally.  No pharyngeal erythema or exudate.  No facial tenderness.  Normal nasal mucosa.  Cardiovascular:  Symmetrical pulses.  Regular rate and rhythm without murmur.  Respiratory:  Normal respiratory effort.  CTA (Clear to auscultation).  No wheezes, rales or rhonchi.  Gastrointestinal:  Soft and nontender.  Normal bowel sounds.  No hepatomegaly or splenomegaly.   Musculoskeletal:  No edema.  No tenderness to palpation.  Back:   Normal alignment.  No CVA (costovertebral angle) tenderness or  midline bony tenderness.  Neurologic:  Oriented x 3.  Cranial nerves 3-12 are intact.  No focal deficits or lateralizing signs.  Psychiatric:   Cooperative.  Appropriate mood and affect.    ASSESSMENT:  1. Medicare welcome visit    2. Encounter for preventive care    3. Encounter for screening mammogram for malignant neoplasm of breast    4. Mild intermittent asthma without complication    5. Dyslipidemia    6. Prediabetes    7. Obesity, morbid, BMI 40.0-49.9 (CMD)        PLAN:    1.  Medicare welcome visit, Encounter for preventive care, Encounter for screening mammogram for malignant neoplasm of breast:  - Discussed health and wellness.  Recommend a low fat/low cholesterol/low salt diet.  Exercise for at least 30 minutes most days of the week.  Work on weight loss. Alcohol and caffeine in moderation.  Paty was given written informational material on diet, exercise, weight loss, cholesterol.  Patient will be notified of all pending lab results.  Recommend a yearly physical.  - Chart reviewed.  - Vaccinations recommended, patient will get shingles vaccine at the pharmacy. Recommended getting pneumonia vaccine, she will think about this as well.  - Colonoscopy discussed. She will think about this as well.  - Bone density scan discussed. She will also think about this and call us back.  - Labs were reviewed and will be discussed below.    2.  Mild intermittent asthma without complication:  - Patient states that her condition has improved.  - Albuterol refilled.  - Continue working on healthy lifestyle changes.    3.  Dyslipidemia, Prediabetes, Obesity, morbid, BMI 40.0-49.9:  - BMI is 44.03.  - Discussed at length about healthy lifestyle changes.  - Patient is to continue working on losing weight in light of her chronic right knee pain, and she is to lose 40 lb.  - Labs reviewed which showed elevated cholesterol which is somewhat improving and A1c that has improved since last check.  - We will repeat labs in a  year.    Orders Placed This Encounter     - New to Medicare Visit - Select if billing add'l E/M    [address]23 - 65+ Follow up preventive, established patient    MAMMO SCREENING BILATERAL    albuterol 108 (90 Base) MCG/ACT inhaler     Return in about 1 year (around 1/22/2025) for Medicare Wellness Visit., or if symptoms worsen or fail to improve.     Patient indicated understanding of the diagnosis and agreed with the assessment and plan of care.  Treatment options discussed with patient and explained in detail.  The risks, benefits and potential side effects of possible medications reviewed.  Alternatives were discussed.  Medication instructions and consequences of not taking medications were discussed.  Patient is understanding was assessed and patient agreed with plan.  Monitoring parameters and expected course outlined.  Patient to call or come in if symptoms fail to respond as outlined or worsen in anyway.    I,  Karlee Carrion, have created a visit summary document based on the audio recording between Dr. Nikita Cassidy DO and this patient for the physician to review, edit as needed, and authenticate.  Creation Date: 01/23/2024.    I have reviewed and edited the visit summary above and attest that it is accurate.    Nikita Cassidy DO  Family Medicine  01/23/24

## 2024-04-08 ENCOUNTER — TELEPHONE (OUTPATIENT)
Dept: PULMONOLOGY | Age: 74
End: 2024-04-08

## 2024-04-23 RX ORDER — ATORVASTATIN CALCIUM 20 MG/1
TABLET, FILM COATED ORAL
Qty: 90 TABLET | Refills: 0 | Status: SHIPPED | OUTPATIENT
Start: 2024-04-23

## 2024-04-23 NOTE — TELEPHONE ENCOUNTER
Medication:   Requested Prescriptions     Pending Prescriptions Disp Refills    atorvastatin (LIPITOR) 20 MG tablet [Pharmacy Med Name: ATORVASTATIN 20 MG TABLET] 90 tablet 1     Sig: TAKE 1 TABLET BY MOUTH DAILY     Last Filled:  10/26/2023    Last appt: 11/27/2023   Next appt: Visit date not found    Last Lipid:   Lab Results   Component Value Date/Time    CHOL 153 11/27/2023 10:23 AM    TRIG 96 11/27/2023 10:23 AM    HDL 64 11/27/2023 10:23 AM    LDLCALC 70 11/27/2023 10:23 AM

## 2024-04-29 ENCOUNTER — TELEPHONE (OUTPATIENT)
Dept: PULMONOLOGY | Age: 74
End: 2024-04-29

## 2024-04-29 NOTE — TELEPHONE ENCOUNTER
Spoke with pt to review his ASV titration. Order to be sent to [facility].  Pt to schedule 60-90 day f/u

## 2024-04-29 NOTE — PROGRESS NOTES
MD MICKEY      NPI: [id number]       Order Signed Date: 24    Electronically signed by DONNY AREVALO MD on 2024 at 2:17 PM    Ruben Hardy  1950  [address] [place] [place] [address]  [phone] (home)   [phone] (mobile)      Insurance Info (confirm with patient if correct):  Payer/Plan Subscr  Sex Relation Sub. Ins. ID Effective Group Num   1. MEDICARE - [place]* RUBEN HARDY 1950 Male Self [id number]                                    [address] [address]   2. [facility]* RUBEN HARDY 1950 Male Self [id number]                                    [address]

## 2024-05-02 RX ORDER — INDAPAMIDE 1.25 MG/1
1.25 TABLET ORAL EVERY MORNING
Qty: 30 TABLET | Refills: 0 | Status: SHIPPED | OUTPATIENT
Start: 2024-05-02

## 2024-05-02 NOTE — TELEPHONE ENCOUNTER
Medication:   Requested Prescriptions     Pending Prescriptions Disp Refills    indapamide (LOZOL) 1.25 MG tablet 30 tablet 0     Sig: Take 1 tablet by mouth every morning     Last Filled:  11.21.23    Last appt: 11/27/2023   Next appt: Visit date not found    Last OARRS:        No data to display

## 2024-05-24 RX ORDER — INDAPAMIDE 1.25 MG/1
1.25 TABLET ORAL EVERY MORNING
Qty: 30 TABLET | Refills: 0 | Status: SHIPPED | OUTPATIENT
Start: 2024-05-24

## 2024-05-24 NOTE — TELEPHONE ENCOUNTER
Medication:   Requested Prescriptions     Pending Prescriptions Disp Refills    indapamide (LOZOL) 1.25 MG tablet 30 tablet 0     Sig: Take 1 tablet by mouth every morning     Last Filled:  05/02/2024    Last appt: 11/27/2023   Next appt: 05/28/2024    Last OARRS:        No data to display

## 2024-05-27 ASSESSMENT — PATIENT HEALTH QUESTIONNAIRE - PHQ9
SUM OF ALL RESPONSES TO PHQ QUESTIONS 1-9: 0
1. LITTLE INTEREST OR PLEASURE IN DOING THINGS: NOT AT ALL
SUM OF ALL RESPONSES TO PHQ QUESTIONS 1-9: 0
SUM OF ALL RESPONSES TO PHQ QUESTIONS 1-9: 0
2. FEELING DOWN, DEPRESSED OR HOPELESS: NOT AT ALL
SUM OF ALL RESPONSES TO PHQ9 QUESTIONS 1 & 2: 0
2. FEELING DOWN, DEPRESSED OR HOPELESS: NOT AT ALL
SUM OF ALL RESPONSES TO PHQ QUESTIONS 1-9: 0
SUM OF ALL RESPONSES TO PHQ9 QUESTIONS 1 & 2: 0
1. LITTLE INTEREST OR PLEASURE IN DOING THINGS: NOT AT ALL

## 2024-05-28 ENCOUNTER — OFFICE VISIT (OUTPATIENT)
Dept: PRIMARY CARE CLINIC | Age: 74
End: 2024-05-28

## 2024-05-28 VITALS
OXYGEN SATURATION: 98 % | HEART RATE: 66 BPM | TEMPERATURE: 97.1 F | DIASTOLIC BLOOD PRESSURE: 84 MMHG | WEIGHT: 231.4 LBS | BODY MASS INDEX: 29.7 KG/M2 | HEIGHT: 74 IN | SYSTOLIC BLOOD PRESSURE: 130 MMHG

## 2024-05-28 DIAGNOSIS — I10 ESSENTIAL HYPERTENSION: Primary | ICD-10-CM

## 2024-05-28 DIAGNOSIS — E78.00 HYPERCHOLESTEROLEMIA: ICD-10-CM

## 2024-05-28 DIAGNOSIS — G47.33 OBSTRUCTIVE SLEEP APNEA: Chronic | ICD-10-CM

## 2024-05-28 RX ORDER — INDAPAMIDE 1.25 MG/1
1.25 TABLET ORAL EVERY MORNING
Qty: 90 TABLET | Refills: 1 | Status: SHIPPED | OUTPATIENT
Start: 2024-05-28

## 2024-05-28 NOTE — PROGRESS NOTES
Subjective:      Patient ID: Ruben Hardy is a 73 y.o. male.    HPI  Hypertension: Patient here for follow-up of elevated blood pressure. He is exercising and is adherent to low salt diet.  Blood pressure is well controlled at home. Cardiac symptoms none. Patient denies chest pain, chest pressure/discomfort, claudication, dyspnea, exertional chest pressure/discomfort, fatigue, irregular heart beat, lower extremity edema, near-syncope, orthopnea, palpitations and paroxysmal nocturnal dyspnea.  Cardiovascular risk factors: dyslipidemia, family history of premature cardiovascular disease, male gender and obesity (BMI >= 30 kg/m2). Use of agents associated with hypertension: none. History of target organ damage: none.'s reading normal at home today also was high here  Pt with history of sleep apnea doing well on CPAP  History of hyperlipidemia on med's doing well     Review of Systems   Constitutional: Negative.    HENT: Negative.    All other systems reviewed and are negative.    Past Medical History:   Diagnosis Date    Allergic rhinitis     Colon polyps 07/26/2006    x2    Headache June 13, 2023    Hypertension     resolved with weight loss    [address]    Obstructive sleep apnea     CPAP user    Obstructive sleep apnea (adult) (pediatric)       Past Surgical History:   Procedure Laterality Date    CYST REMOVAL  1997    back x 2    LASIK  2001    SHOULDER ARTHROSCOPY      SHOULDER SURGERY Right 1/27/2020    RIGHT SHOULDER DIAGNOSTIC ARTHROSCOPY, EUA, MALENA, REVISION SUBACROMIAL DECOMPRESSION,  EXTENSIVE DEBRIDEMENT, REVISION ROTATOR CUFF REPAIR X 2, WITH COLLOGEN GRAFT AUGMENTATION performed by Praveena Rizo MD at [facility] OR    TYMPANOSTOMY TUBE PLACEMENT  2002     Family History   Problem Relation Age of Onset    Heart Attack Father 74        smoker    Sleep Apnea Brother     Prostate Cancer Brother     Sleep Apnea Brother     Prostate Cancer Brother     Diabetes Maternal Grandfather     Crohn's Disease

## 2024-06-27 ENCOUNTER — PATIENT MESSAGE (OUTPATIENT)
Dept: PRIMARY CARE CLINIC | Age: 74
End: 2024-06-27

## 2024-06-28 RX ORDER — LISINOPRIL 20 MG/1
20 TABLET ORAL 2 TIMES DAILY
Qty: 180 TABLET | Refills: 0 | Status: SHIPPED | OUTPATIENT
Start: 2024-06-28

## 2024-06-28 NOTE — TELEPHONE ENCOUNTER
Medication:   Requested Prescriptions     Pending Prescriptions Disp Refills    lisinopril (PRINIVIL;ZESTRIL) 20 MG tablet [Pharmacy Med Name: LISINOPRIL 20 MG TABLET] 180 tablet 0     Sig: TAKE 1 TABLET BY MOUTH 2 TIMES A DAY     Last Filled:  4/2/2024    Last appt: 5/28/2024   Next appt: Visit date not found    Last OARRS:        No data to display

## 2024-07-22 RX ORDER — ATORVASTATIN CALCIUM 20 MG/1
TABLET, FILM COATED ORAL
Qty: 90 TABLET | Refills: 0 | Status: SHIPPED | OUTPATIENT
Start: 2024-07-22

## 2024-07-22 NOTE — TELEPHONE ENCOUNTER
Medication:   Requested Prescriptions     Pending Prescriptions Disp Refills    atorvastatin (LIPITOR) 20 MG tablet [Pharmacy Med Name: ATORVASTATIN 20 MG TABLET] 90 tablet 0     Sig: TAKE 1 TABLET BY MOUTH DAILY     Last Filled:  4/23/24    Last appt: 5/28/2024   Next appt: Visit date not found    Last Lipid:   Lab Results   Component Value Date/Time    CHOL 153 11/27/2023 10:23 AM    TRIG 96 11/27/2023 10:23 AM    HDL 64 11/27/2023 10:23 AM

## 2024-09-03 RX ORDER — INDAPAMIDE 1.25 MG/1
1.25 TABLET ORAL EVERY MORNING
Qty: 90 TABLET | Refills: 0 | Status: SHIPPED | OUTPATIENT
Start: 2024-09-03

## 2024-09-03 NOTE — TELEPHONE ENCOUNTER
Medication:   Requested Prescriptions     Pending Prescriptions Disp Refills    indapamide (LOZOL) 1.25 MG tablet 90 tablet 1     Sig: Take 1 tablet by mouth every morning     Last Filled:  05/28/24    Last appt: 5/28/2024   Next appt:     Last OARRS:        No data to display

## 2024-09-24 ENCOUNTER — TELEPHONE (OUTPATIENT)
Dept: PULMONOLOGY | Age: 74
End: 2024-09-24

## 2024-09-30 RX ORDER — LISINOPRIL 20 MG/1
20 TABLET ORAL 2 TIMES DAILY
Qty: 180 TABLET | Refills: 0 | Status: SHIPPED | OUTPATIENT
Start: 2024-09-30

## 2024-09-30 NOTE — TELEPHONE ENCOUNTER
Medication:   Requested Prescriptions     Pending Prescriptions Disp Refills    lisinopril (PRINIVIL;ZESTRIL) 20 MG tablet [Pharmacy Med Name: LISINOPRIL 20 MG TABLET] 180 tablet 0     Sig: TAKE 1 TABLET BY MOUTH 2 TIMES A DAY     Last Filled:  6.28.24    Last appt: 5/28/2024   Next appt: Visit date not found    Last OARRS:        No data to display

## 2024-10-21 RX ORDER — ATORVASTATIN CALCIUM 20 MG/1
TABLET, FILM COATED ORAL
Qty: 90 TABLET | Refills: 0 | Status: SHIPPED | OUTPATIENT
Start: 2024-10-21

## 2024-10-21 NOTE — TELEPHONE ENCOUNTER
Medication:   Requested Prescriptions     Pending Prescriptions Disp Refills    atorvastatin (LIPITOR) 20 MG tablet [Pharmacy Med Name: ATORVASTATIN 20 MG TABLET] 90 tablet 0     Sig: TAKE 1 TABLET BY MOUTH DAILY     Last Filled:  07/22/2024    Last appt: 5/28/2024   Next appt: Visit date not found    Last Lipid:   Lab Results   Component Value Date/Time    CHOL 153 11/27/2023 10:23 AM    TRIG 96 11/27/2023 10:23 AM    HDL 64 11/27/2023 10:23 AM

## 2024-12-06 RX ORDER — INDAPAMIDE 1.25 MG/1
1.25 TABLET ORAL EVERY MORNING
Qty: 90 TABLET | Refills: 0 | Status: SHIPPED | OUTPATIENT
Start: 2024-12-06

## 2024-12-06 NOTE — TELEPHONE ENCOUNTER
Medication:   Requested Prescriptions     Pending Prescriptions Disp Refills    indapamide (LOZOL) 1.25 MG tablet 90 tablet 0     Sig: Take 1 tablet by mouth every morning     Last Filled:  09/03/2024    Last appt: 5/28/2024   Next appt: Visit date not found    Last OARRS:        No data to display

## 2024-12-10 SDOH — ECONOMIC STABILITY: FOOD INSECURITY: WITHIN THE PAST 12 MONTHS, THE FOOD YOU BOUGHT JUST DIDN'T LAST AND YOU DIDN'T HAVE MONEY TO GET MORE.: NEVER TRUE

## 2024-12-10 SDOH — ECONOMIC STABILITY: FOOD INSECURITY: WITHIN THE PAST 12 MONTHS, YOU WORRIED THAT YOUR FOOD WOULD RUN OUT BEFORE YOU GOT MONEY TO BUY MORE.: NEVER TRUE

## 2024-12-10 SDOH — ECONOMIC STABILITY: INCOME INSECURITY: HOW HARD IS IT FOR YOU TO PAY FOR THE VERY BASICS LIKE FOOD, HOUSING, MEDICAL CARE, AND HEATING?: NOT HARD AT ALL

## 2024-12-11 ENCOUNTER — OFFICE VISIT (OUTPATIENT)
Dept: PRIMARY CARE CLINIC | Age: 74
End: 2024-12-11

## 2024-12-11 VITALS
HEIGHT: 72 IN | DIASTOLIC BLOOD PRESSURE: 80 MMHG | OXYGEN SATURATION: 97 % | WEIGHT: 223.8 LBS | SYSTOLIC BLOOD PRESSURE: 130 MMHG | HEART RATE: 73 BPM | BODY MASS INDEX: 30.31 KG/M2

## 2024-12-11 DIAGNOSIS — E78.00 HYPERCHOLESTEROLEMIA: ICD-10-CM

## 2024-12-11 DIAGNOSIS — I10 ESSENTIAL HYPERTENSION: Primary | ICD-10-CM

## 2024-12-11 DIAGNOSIS — G47.33 OBSTRUCTIVE SLEEP APNEA: ICD-10-CM

## 2024-12-11 DIAGNOSIS — I10 ESSENTIAL HYPERTENSION: ICD-10-CM

## 2024-12-11 LAB
ALBUMIN SERPL-MCNC: 4.1 G/DL (ref 3.4–5)
ALP SERPL-CCNC: 37 U/L (ref 40–129)
ALT SERPL-CCNC: 8 U/L (ref 10–40)
ANION GAP SERPL CALCULATED.3IONS-SCNC: 9 MMOL/L (ref 3–16)
AST SERPL-CCNC: 18 U/L (ref 15–37)
BASOPHILS # BLD: 0 K/UL (ref 0–0.2)
BASOPHILS NFR BLD: 0.5 %
BILIRUB DIRECT SERPL-MCNC: 0.5 MG/DL (ref 0–0.3)
BILIRUB INDIRECT SERPL-MCNC: 0.7 MG/DL (ref 0–1)
BILIRUB SERPL-MCNC: 1.2 MG/DL (ref 0–1)
BUN SERPL-MCNC: 18 MG/DL (ref 7–20)
CALCIUM SERPL-MCNC: 10.2 MG/DL (ref 8.3–10.6)
CHLORIDE SERPL-SCNC: 105 MMOL/L (ref 99–110)
CHOLEST SERPL-MCNC: 140 MG/DL (ref 0–199)
CO2 SERPL-SCNC: 25 MMOL/L (ref 21–32)
CREAT SERPL-MCNC: 1.1 MG/DL (ref 0.8–1.3)
DEPRECATED RDW RBC AUTO: 14.2 % (ref 12.4–15.4)
EOSINOPHIL # BLD: 0.7 K/UL (ref 0–0.6)
EOSINOPHIL NFR BLD: 10.8 %
GFR SERPLBLD CREATININE-BSD FMLA CKD-EPI: 71 ML/MIN/{1.73_M2}
GLUCOSE SERPL-MCNC: 80 MG/DL (ref 70–99)
HCT VFR BLD AUTO: 43.7 % (ref 40.5–52.5)
HDLC SERPL-MCNC: 68 MG/DL (ref 40–60)
HGB BLD-MCNC: 14.4 G/DL (ref 13.5–17.5)
LDLC SERPL CALC-MCNC: 61 MG/DL
LYMPHOCYTES # BLD: 1.7 K/UL (ref 1–5.1)
LYMPHOCYTES NFR BLD: 26.4 %
MCH RBC QN AUTO: 29.7 PG (ref 26–34)
MCHC RBC AUTO-ENTMCNC: 33 G/DL (ref 31–36)
MCV RBC AUTO: 90.2 FL (ref 80–100)
MONOCYTES # BLD: 0.7 K/UL (ref 0–1.3)
MONOCYTES NFR BLD: 9.9 %
NEUTROPHILS # BLD: 3.4 K/UL (ref 1.7–7.7)
NEUTROPHILS NFR BLD: 52.4 %
PLATELET # BLD AUTO: 227 K/UL (ref 135–450)
PMV BLD AUTO: 7.9 FL (ref 5–10.5)
POTASSIUM SERPL-SCNC: 4.5 MMOL/L (ref 3.5–5.1)
PROT SERPL-MCNC: 6.5 G/DL (ref 6.4–8.2)
PSA SERPL DL<=0.01 NG/ML-MCNC: 1.7 NG/ML (ref 0–4)
RBC # BLD AUTO: 4.85 M/UL (ref 4.2–5.9)
SODIUM SERPL-SCNC: 139 MMOL/L (ref 136–145)
TRIGL SERPL-MCNC: 56 MG/DL (ref 0–150)
VLDLC SERPL CALC-MCNC: 11 MG/DL
WBC # BLD AUTO: 6.6 K/UL (ref 4–11)

## 2024-12-11 RX ORDER — SILDENAFIL 100 MG/1
100 TABLET, FILM COATED ORAL PRN
Qty: 12 TABLET | Refills: 1 | Status: SHIPPED | OUTPATIENT
Start: 2024-12-11

## 2024-12-11 SDOH — ECONOMIC STABILITY: INCOME INSECURITY: HOW HARD IS IT FOR YOU TO PAY FOR THE VERY BASICS LIKE FOOD, HOUSING, MEDICAL CARE, AND HEATING?: NOT HARD AT ALL

## 2024-12-11 SDOH — ECONOMIC STABILITY: FOOD INSECURITY: WITHIN THE PAST 12 MONTHS, YOU WORRIED THAT YOUR FOOD WOULD RUN OUT BEFORE YOU GOT MONEY TO BUY MORE.: NEVER TRUE

## 2024-12-11 SDOH — ECONOMIC STABILITY: FOOD INSECURITY: WITHIN THE PAST 12 MONTHS, THE FOOD YOU BOUGHT JUST DIDN'T LAST AND YOU DIDN'T HAVE MONEY TO GET MORE.: NEVER TRUE

## 2024-12-11 NOTE — PROGRESS NOTES
Subjective:      Patient ID: Ruben Hardy is a 74 y.o. male.    HPI  Hypertension: Patient here for follow-up of elevated blood pressure. He is exercising and is adherent to low salt diet.  Blood pressure is well controlled at home. Cardiac symptoms none. Patient denies chest pain, chest pressure/discomfort, claudication, dyspnea, exertional chest pressure/discomfort, fatigue, irregular heart beat, lower extremity edema, near-syncope, orthopnea, palpitations and paroxysmal nocturnal dyspnea.  Cardiovascular risk factors: dyslipidemia, family history of premature cardiovascular disease, male gender and obesity (BMI >= 30 kg/m2). Use of agents associated with hypertension: none. History of target organ damage: none.'s reading normal at home today also was high here  Pt with history of sleep apnea doing well on CPAP  History of hyperlipidemia on med's doing well   He has lost some weight since the last time he was seen he has been using Zepbound through online company is helping him to give his appetite is trying to exercise and eat better he is tolerating medication well without no side effect  Review of Systems   Constitutional: Negative.    HENT: Negative.    All other systems reviewed and are negative.    Past Medical History:   Diagnosis Date    Allergic rhinitis     Colon polyps 07/26/2006    x2    Complex sleep apnea syndrome     CPAP user      Headache June 13, 2023    Hypertension     resolved with weight loss    Malaria 2011    [facility]    Obstructive sleep apnea     CPAP user    Obstructive sleep apnea (adult) (pediatric)       Past Surgical History:   Procedure Laterality Date    CYST REMOVAL  1997    back x 2    LASIK  2001    SHOULDER ARTHROSCOPY      SHOULDER SURGERY Right 1/27/2020    RIGHT SHOULDER DIAGNOSTIC ARTHROSCOPY, EUA, MALENA, REVISION SUBACROMIAL DECOMPRESSION,  EXTENSIVE DEBRIDEMENT, REVISION ROTATOR CUFF REPAIR X 2, WITH COLLOGEN GRAFT AUGMENTATION performed by Praveena Rizo MD at [facility] OR

## 2024-12-27 RX ORDER — LISINOPRIL 20 MG/1
20 TABLET ORAL 2 TIMES DAILY
Qty: 60 TABLET | Refills: 0 | Status: SHIPPED | OUTPATIENT
Start: 2024-12-27

## 2024-12-27 NOTE — TELEPHONE ENCOUNTER
Medication:   Requested Prescriptions     Pending Prescriptions Disp Refills    lisinopril (PRINIVIL;ZESTRIL) 20 MG tablet [Pharmacy Med Name: LISINOPRIL 20 MG TABLET] 60 tablet 0     Sig: TAKE 1 TABLET BY MOUTH 2 TIMES A DAY     Last Filled:  9/30/2024    Last appt: 12/11/2024   Next appt: Visit date not found    Last OARRS:        No data to display

## 2025-01-21 RX ORDER — ATORVASTATIN CALCIUM 20 MG/1
TABLET, FILM COATED ORAL
Qty: 90 TABLET | Refills: 0 | Status: SHIPPED | OUTPATIENT
Start: 2025-01-21

## 2025-01-21 NOTE — TELEPHONE ENCOUNTER
Medication:   Requested Prescriptions     Pending Prescriptions Disp Refills    atorvastatin (LIPITOR) 20 MG tablet [Pharmacy Med Name: ATORVASTATIN 20 MG TABLET] 90 tablet 0     Sig: TAKE 1 TABLET BY MOUTH DAILY     Last Filled:  10/21/2024    Last appt: 12/11/2024   Next appt: Visit date not found    Last Lipid:   Lab Results   Component Value Date/Time    CHOL 140 12/11/2024 10:16 AM    TRIG 56 12/11/2024 10:16 AM    HDL 68 12/11/2024 10:16 AM

## 2025-02-28 RX ORDER — INDAPAMIDE 1.25 MG/1
1.25 TABLET ORAL EVERY MORNING
Qty: 90 TABLET | Refills: 0 | Status: SHIPPED | OUTPATIENT
Start: 2025-02-28

## 2025-02-28 RX ORDER — LISINOPRIL 20 MG/1
20 TABLET ORAL 2 TIMES DAILY
Qty: 60 TABLET | Refills: 0 | Status: SHIPPED | OUTPATIENT
Start: 2025-02-28

## 2025-03-31 RX ORDER — LISINOPRIL 20 MG/1
20 TABLET ORAL 2 TIMES DAILY
Qty: 60 TABLET | Refills: 0 | Status: SHIPPED | OUTPATIENT
Start: 2025-03-31

## 2025-03-31 NOTE — TELEPHONE ENCOUNTER
Medication:   Requested Prescriptions     Pending Prescriptions Disp Refills    lisinopril (PRINIVIL;ZESTRIL) 20 MG tablet [Pharmacy Med Name: LISINOPRIL 20 MG TABLET] 60 tablet 0     Sig: TAKE 1 TABLET BY MOUTH 2 TIMES A DAY     Last Filled:  2.28.25    Last appt: 12/11/2024   Next appt: Visit date not found    Last OARRS:        No data to display

## 2025-04-23 RX ORDER — ATORVASTATIN CALCIUM 20 MG/1
20 TABLET, FILM COATED ORAL DAILY
Qty: 90 TABLET | Refills: 0 | Status: SHIPPED | OUTPATIENT
Start: 2025-04-23

## 2025-04-23 NOTE — TELEPHONE ENCOUNTER
Medication:   Requested Prescriptions     Pending Prescriptions Disp Refills    atorvastatin (LIPITOR) 20 MG tablet [Pharmacy Med Name: ATORVASTATIN 20 MG TABLET] 90 tablet 0     Sig: TAKE 1 TABLET BY MOUTH DAILY     Last Filled:  1/21/25    Last appt: 12/11/2024   Next appt: Visit date not found    Last Lipid:   Lab Results   Component Value Date/Time    CHOL 140 12/11/2024 10:16 AM    TRIG 56 12/11/2024 10:16 AM    HDL 68 12/11/2024 10:16 AM

## 2025-04-28 RX ORDER — LISINOPRIL 20 MG/1
20 TABLET ORAL 2 TIMES DAILY
Qty: 60 TABLET | Refills: 0 | Status: SHIPPED | OUTPATIENT
Start: 2025-04-28

## 2025-04-28 NOTE — TELEPHONE ENCOUNTER
Medication:   Requested Prescriptions     Pending Prescriptions Disp Refills    lisinopril (PRINIVIL;ZESTRIL) 20 MG tablet [Pharmacy Med Name: LISINOPRIL 20 MG TABLET] 60 tablet 0     Sig: TAKE 1 TABLET BY MOUTH 2 TIMES A DAY     Last Filled:  03/31/25    Last appt: 12/11/2024   Next appt: Visit date not found    Last OARRS:        No data to display

## 2025-05-22 RX ORDER — LISINOPRIL 20 MG/1
20 TABLET ORAL 2 TIMES DAILY
Qty: 60 TABLET | Refills: 0 | Status: SHIPPED | OUTPATIENT
Start: 2025-05-22

## 2025-05-22 NOTE — TELEPHONE ENCOUNTER
Medication:   Requested Prescriptions     Pending Prescriptions Disp Refills    lisinopril (PRINIVIL;ZESTRIL) 20 MG tablet 60 tablet 0     Sig: Take 1 tablet by mouth 2 times daily     Last Filled:  4.28.25    Last appt: 12/11/2024   Next appt: Visit date not found    Last OARRS:        No data to display

## 2025-06-16 NOTE — TELEPHONE ENCOUNTER
Medication:   Requested Prescriptions     Pending Prescriptions Disp Refills    sildenafil (VIAGRA) 100 MG tablet 12 tablet 1     Sig: Take 1 tablet by mouth as needed for Erectile Dysfunction    indapamide (LOZOL) 1.25 MG tablet 90 tablet 0     Sig: Take 1 tablet by mouth every morning     Last Filled:  12.11.24 2.28.25    Last appt: 12/11/2024   Next appt: Visit date not found    Last OARRS:        No data to display

## 2025-06-19 RX ORDER — INDAPAMIDE 1.25 MG/1
1.25 TABLET ORAL EVERY MORNING
Qty: 90 TABLET | Refills: 0 | Status: SHIPPED | OUTPATIENT
Start: 2025-06-19

## 2025-06-19 RX ORDER — SILDENAFIL 100 MG/1
100 TABLET, FILM COATED ORAL PRN
Qty: 12 TABLET | Refills: 1 | Status: SHIPPED | OUTPATIENT
Start: 2025-06-19

## 2025-06-19 NOTE — TELEPHONE ENCOUNTER
Medication:   Requested Prescriptions     Pending Prescriptions Disp Refills    lisinopril (PRINIVIL;ZESTRIL) 20 MG tablet [Pharmacy Med Name: LISINOPRIL 20 MG TABLET] 60 tablet 0     Sig: TAKE 1 TABLET BY MOUTH 2 TIMES A DAY     Last Filled:  5.22.25    Last appt: 12/11/2024   Next appt: Visit date not found  Left message for return call.  Due for ov    Last OARRS:        No data to display

## 2025-06-20 SDOH — ECONOMIC STABILITY: INCOME INSECURITY: IN THE LAST 12 MONTHS, WAS THERE A TIME WHEN YOU WERE NOT ABLE TO PAY THE MORTGAGE OR RENT ON TIME?: NO

## 2025-06-20 SDOH — ECONOMIC STABILITY: FOOD INSECURITY: WITHIN THE PAST 12 MONTHS, YOU WORRIED THAT YOUR FOOD WOULD RUN OUT BEFORE YOU GOT MONEY TO BUY MORE.: NEVER TRUE

## 2025-06-20 SDOH — ECONOMIC STABILITY: FOOD INSECURITY: WITHIN THE PAST 12 MONTHS, THE FOOD YOU BOUGHT JUST DIDN'T LAST AND YOU DIDN'T HAVE MONEY TO GET MORE.: NEVER TRUE

## 2025-06-20 ASSESSMENT — PATIENT HEALTH QUESTIONNAIRE - PHQ9
2. FEELING DOWN, DEPRESSED OR HOPELESS: NOT AT ALL
1. LITTLE INTEREST OR PLEASURE IN DOING THINGS: NOT AT ALL
SUM OF ALL RESPONSES TO PHQ9 QUESTIONS 1 & 2: 0
1. LITTLE INTEREST OR PLEASURE IN DOING THINGS: NOT AT ALL
SUM OF ALL RESPONSES TO PHQ QUESTIONS 1-9: 0
2. FEELING DOWN, DEPRESSED OR HOPELESS: NOT AT ALL
SUM OF ALL RESPONSES TO PHQ QUESTIONS 1-9: 0

## 2025-06-23 ENCOUNTER — OFFICE VISIT (OUTPATIENT)
Dept: PRIMARY CARE CLINIC | Age: 75
End: 2025-06-23
Payer: MEDICARE

## 2025-06-23 VITALS
BODY MASS INDEX: 29.67 KG/M2 | HEART RATE: 70 BPM | SYSTOLIC BLOOD PRESSURE: 128 MMHG | OXYGEN SATURATION: 99 % | WEIGHT: 231.2 LBS | DIASTOLIC BLOOD PRESSURE: 88 MMHG | HEIGHT: 74 IN

## 2025-06-23 DIAGNOSIS — I10 ESSENTIAL HYPERTENSION: ICD-10-CM

## 2025-06-23 DIAGNOSIS — E78.00 HYPERCHOLESTEROLEMIA: ICD-10-CM

## 2025-06-23 DIAGNOSIS — Z76.89 ENCOUNTER FOR WEIGHT MANAGEMENT: ICD-10-CM

## 2025-06-23 DIAGNOSIS — G47.33 OBSTRUCTIVE SLEEP APNEA: ICD-10-CM

## 2025-06-23 DIAGNOSIS — I10 ESSENTIAL HYPERTENSION: Primary | ICD-10-CM

## 2025-06-23 LAB
ALBUMIN SERPL-MCNC: 4.1 G/DL (ref 3.4–5)
ALP SERPL-CCNC: 40 U/L (ref 40–129)
ALT SERPL-CCNC: 12 U/L (ref 10–40)
ANION GAP SERPL CALCULATED.3IONS-SCNC: 10 MMOL/L (ref 3–16)
AST SERPL-CCNC: 22 U/L (ref 15–37)
BILIRUB DIRECT SERPL-MCNC: 0.4 MG/DL (ref 0–0.3)
BILIRUB INDIRECT SERPL-MCNC: 0.7 MG/DL (ref 0–1)
BILIRUB SERPL-MCNC: 1.1 MG/DL (ref 0–1)
BUN SERPL-MCNC: 21 MG/DL (ref 7–20)
CALCIUM SERPL-MCNC: 10.2 MG/DL (ref 8.3–10.6)
CHLORIDE SERPL-SCNC: 106 MMOL/L (ref 99–110)
CHOLEST SERPL-MCNC: 151 MG/DL (ref 0–199)
CO2 SERPL-SCNC: 25 MMOL/L (ref 21–32)
CREAT SERPL-MCNC: 1.1 MG/DL (ref 0.8–1.3)
GFR SERPLBLD CREATININE-BSD FMLA CKD-EPI: 70 ML/MIN/{1.73_M2}
GLUCOSE SERPL-MCNC: 89 MG/DL (ref 70–99)
HDLC SERPL-MCNC: 67 MG/DL (ref 40–60)
LDLC SERPL CALC-MCNC: 70 MG/DL
POTASSIUM SERPL-SCNC: 3.8 MMOL/L (ref 3.5–5.1)
PROT SERPL-MCNC: 6.6 G/DL (ref 6.4–8.2)
SODIUM SERPL-SCNC: 141 MMOL/L (ref 136–145)
TRIGL SERPL-MCNC: 69 MG/DL (ref 0–150)
TSH SERPL DL<=0.005 MIU/L-ACNC: 1.52 UIU/ML (ref 0.27–4.2)
VLDLC SERPL CALC-MCNC: 14 MG/DL

## 2025-06-23 PROCEDURE — G8417 CALC BMI ABV UP PARAM F/U: HCPCS | Performed by: FAMILY MEDICINE

## 2025-06-23 PROCEDURE — 3017F COLORECTAL CA SCREEN DOC REV: CPT | Performed by: FAMILY MEDICINE

## 2025-06-23 PROCEDURE — 99214 OFFICE O/P EST MOD 30 MIN: CPT | Performed by: FAMILY MEDICINE

## 2025-06-23 PROCEDURE — 3079F DIAST BP 80-89 MM HG: CPT | Performed by: FAMILY MEDICINE

## 2025-06-23 PROCEDURE — 1123F ACP DISCUSS/DSCN MKR DOCD: CPT | Performed by: FAMILY MEDICINE

## 2025-06-23 PROCEDURE — 1036F TOBACCO NON-USER: CPT | Performed by: FAMILY MEDICINE

## 2025-06-23 PROCEDURE — 3074F SYST BP LT 130 MM HG: CPT | Performed by: FAMILY MEDICINE

## 2025-06-23 PROCEDURE — 1159F MED LIST DOCD IN RCRD: CPT | Performed by: FAMILY MEDICINE

## 2025-06-23 PROCEDURE — G8427 DOCREV CUR MEDS BY ELIG CLIN: HCPCS | Performed by: FAMILY MEDICINE

## 2025-06-23 RX ORDER — LISINOPRIL 20 MG/1
20 TABLET ORAL 2 TIMES DAILY
Qty: 60 TABLET | Refills: 0 | Status: SHIPPED | OUTPATIENT
Start: 2025-06-23

## 2025-06-23 NOTE — PROGRESS NOTES
Subjective:      Patient ID: Ruben Hardy is a 75 y.o. male.    HPI  Hypertension: Patient here for follow-up of elevated blood pressure. He is exercising and is adherent to low salt diet.  Blood pressure is well controlled at home. Cardiac symptoms none. Patient denies chest pain, chest pressure/discomfort, claudication, dyspnea, exertional chest pressure/discomfort, fatigue, irregular heart beat, lower extremity edema, near-syncope, orthopnea, palpitations and paroxysmal nocturnal dyspnea.  Cardiovascular risk factors: dyslipidemia, family history of premature cardiovascular disease, male gender and obesity (BMI >= 30 kg/m2). Use of agents associated with hypertension: none. History of target organ damage: none.'s reading normal at home today also was high here  Pt with history of sleep apnea doing well on CPAP  History of hyperlipidemia on med's doing well   Patient used about for some time he wants to go back on it he is not in contact with the physician who gave it to him  He wants prescription  It did help him lose weight according to him he tried to monitor his diet and be as active as possible  Overall he feels well  Review of Systems   Constitutional: Negative.    HENT: Negative.    All other systems reviewed and are negative.    Past Medical History:   Diagnosis Date    Allergic rhinitis     Colon polyps 07/26/2006    x2    Complex sleep apnea syndrome     CPAP user      Headache June 13, 2023    Hypertension     resolved with weight loss    Malaria 2011    [facility]    Obstructive sleep apnea     CPAP user    Obstructive sleep apnea (adult) (pediatric)       Past Surgical History:   Procedure Laterality Date    CYST REMOVAL  1997    back x 2    LASIK  2001    SHOULDER ARTHROSCOPY      SHOULDER SURGERY Right 1/27/2020    RIGHT SHOULDER DIAGNOSTIC ARTHROSCOPY, EUA, MALENA, REVISION SUBACROMIAL DECOMPRESSION,  EXTENSIVE DEBRIDEMENT, REVISION ROTATOR CUFF REPAIR X 2, WITH COLLOGEN GRAFT AUGMENTATION performed by

## 2025-06-24 ENCOUNTER — PATIENT MESSAGE (OUTPATIENT)
Dept: PRIMARY CARE CLINIC | Age: 75
End: 2025-06-24

## 2025-06-24 ENCOUNTER — RESULTS FOLLOW-UP (OUTPATIENT)
Dept: PRIMARY CARE CLINIC | Age: 75
End: 2025-06-24

## 2025-06-24 LAB
DEPRECATED RDW RBC AUTO: 13.4 % (ref 12.4–15.4)
HCT VFR BLD AUTO: 42.5 % (ref 40.5–52.5)
HGB BLD-MCNC: 14.7 G/DL (ref 13.5–17.5)
MCH RBC QN AUTO: 30.5 PG (ref 26–34)
MCHC RBC AUTO-ENTMCNC: 34.6 G/DL (ref 31–36)
MCV RBC AUTO: 88.2 FL (ref 80–100)
PLATELET # BLD AUTO: 252 K/UL (ref 135–450)
PMV BLD AUTO: 8 FL (ref 5–10.5)
RBC # BLD AUTO: 4.82 M/UL (ref 4.2–5.9)
WBC # BLD AUTO: 6.6 K/UL (ref 4–11)

## 2025-07-21 ENCOUNTER — OFFICE VISIT (OUTPATIENT)
Dept: PRIMARY CARE CLINIC | Age: 75
End: 2025-07-21

## 2025-07-21 VITALS
SYSTOLIC BLOOD PRESSURE: 120 MMHG | WEIGHT: 231 LBS | OXYGEN SATURATION: 97 % | HEIGHT: 72 IN | DIASTOLIC BLOOD PRESSURE: 78 MMHG | HEART RATE: 51 BPM | BODY MASS INDEX: 31.29 KG/M2

## 2025-07-21 DIAGNOSIS — Z76.89 ENCOUNTER FOR WEIGHT MANAGEMENT: Primary | ICD-10-CM

## 2025-07-21 DIAGNOSIS — L98.9 SKIN ABNORMALITIES: ICD-10-CM

## 2025-07-21 RX ORDER — LISINOPRIL 20 MG/1
20 TABLET ORAL 2 TIMES DAILY
Qty: 60 TABLET | Refills: 0 | Status: SHIPPED | OUTPATIENT
Start: 2025-07-21

## 2025-07-21 RX ORDER — ATORVASTATIN CALCIUM 20 MG/1
20 TABLET, FILM COATED ORAL DAILY
Qty: 90 TABLET | Refills: 0 | Status: SHIPPED | OUTPATIENT
Start: 2025-07-21

## 2025-07-21 RX ORDER — MUPIROCIN 2 %
OINTMENT (GRAM) TOPICAL
Qty: 30 G | Refills: 0 | Status: SHIPPED | OUTPATIENT
Start: 2025-07-21 | End: 2025-07-28

## 2025-07-21 ASSESSMENT — ENCOUNTER SYMPTOMS
RESPIRATORY NEGATIVE: 1
GASTROINTESTINAL NEGATIVE: 1
EYES NEGATIVE: 1

## 2025-07-21 NOTE — TELEPHONE ENCOUNTER
Medication:   Requested Prescriptions     Pending Prescriptions Disp Refills    lisinopril (PRINIVIL;ZESTRIL) 20 MG tablet [Pharmacy Med Name: LISINOPRIL 20 MG TABLET] 60 tablet 0     Sig: TAKE 1 TABLET BY MOUTH 2 TIMES A DAY    atorvastatin (LIPITOR) 20 MG tablet [Pharmacy Med Name: ATORVASTATIN 20 MG TABLET] 90 tablet 0     Sig: TAKE 1 TABLET BY MOUTH DAILY     Last Filled:  06/23/2025, 04/23/2025    Last appt: 6/23/2025   Next appt: 7/21/2025    Last OARRS:        No data to display

## 2025-07-21 NOTE — PROGRESS NOTES
SUBJECTIVE:  Patient ID: Ruben Hardy is a 75 y.o. y.o. male     HPI   Patient is here for follow-up on weight loss  He is doing Zepbound 2.5 he has been trying to walk regularly but about 5 to 6 miles a day he has not lost any weight he is not keeping track of his caloric intake  Patient with hypertension possibly otherwise stable  Past Medical History:   Diagnosis Date    Allergic rhinitis     Colon polyps 07/26/2006    x2    Complex sleep apnea syndrome     CPAP user      Headache June 13, 2023    Hypertension     resolved with weight loss    [place]aria 2011    [facility]    Obstructive sleep apnea     CPAP user    Obstructive sleep apnea (adult) (pediatric)       Past Surgical History:   Procedure Laterality Date    CYST REMOVAL  1997    back x 2    LASIK  2001    SHOULDER ARTHROSCOPY      SHOULDER SURGERY Right 1/27/2020    RIGHT SHOULDER DIAGNOSTIC ARTHROSCOPY, EUA, MALENA, REVISION SUBACROMIAL DECOMPRESSION,  EXTENSIVE DEBRIDEMENT, REVISION ROTATOR CUFF REPAIR X 2, WITH COLLOGEN GRAFT AUGMENTATION performed by Praveena Rizo MD at [facility] OR    TYMPANOSTOMY TUBE PLACEMENT  2002     Family History   Problem Relation Age of Onset    Heart Attack Father 74        smoker    Sleep Apnea Brother     Prostate Cancer Brother     Sleep Apnea Brother     Prostate Cancer Brother     Diabetes Maternal Grandfather     Crohn's Disease Brother         healthy    Heart Attack Brother 48    Asthma Brother      Social History     Socioeconomic History    Marital status:      Spouse name: Anusha    Number of children: 4    Years of education: None    Highest education level: None   Occupational History    Occupation: retired     Comment: [employer]   Tobacco Use    Smoking status: Never     Passive exposure: Past    Smokeless tobacco: Never    Tobacco comments:     Freshman in college 53 years ago   Vaping Use    Vaping status: Never Used   Substance and Sexual Activity    Alcohol use: Yes     Alcohol/week: 2.0 standard drinks of

## 2025-07-24 ENCOUNTER — PATIENT MESSAGE (OUTPATIENT)
Dept: PRIMARY CARE CLINIC | Age: 75
End: 2025-07-24

## 2025-08-15 RX ORDER — TIRZEPATIDE 5 MG/.5ML
INJECTION, SOLUTION SUBCUTANEOUS
Qty: 2 ML | Refills: 0 | Status: SHIPPED | OUTPATIENT
Start: 2025-08-15

## 2025-08-18 ENCOUNTER — OFFICE VISIT (OUTPATIENT)
Dept: PRIMARY CARE CLINIC | Age: 75
End: 2025-08-18

## 2025-08-18 VITALS
OXYGEN SATURATION: 97 % | DIASTOLIC BLOOD PRESSURE: 80 MMHG | SYSTOLIC BLOOD PRESSURE: 120 MMHG | HEART RATE: 65 BPM | WEIGHT: 226.4 LBS | HEIGHT: 72 IN | BODY MASS INDEX: 30.66 KG/M2

## 2025-08-18 DIAGNOSIS — Z76.89 ENCOUNTER FOR WEIGHT MANAGEMENT: ICD-10-CM

## 2025-08-18 DIAGNOSIS — Z00.00 MEDICARE ANNUAL WELLNESS VISIT, SUBSEQUENT: Primary | ICD-10-CM

## 2025-08-18 RX ORDER — ATORVASTATIN CALCIUM 20 MG/1
20 TABLET, FILM COATED ORAL DAILY
Qty: 90 TABLET | Refills: 1 | Status: SHIPPED | OUTPATIENT
Start: 2025-08-18

## 2025-08-18 RX ORDER — LISINOPRIL 20 MG/1
20 TABLET ORAL 2 TIMES DAILY
Qty: 60 TABLET | Refills: 0 | OUTPATIENT
Start: 2025-08-18

## 2025-08-18 RX ORDER — INDAPAMIDE 1.25 MG/1
1.25 TABLET ORAL EVERY MORNING
Qty: 90 TABLET | Refills: 1 | Status: SHIPPED | OUTPATIENT
Start: 2025-08-18

## 2025-08-18 RX ORDER — LISINOPRIL 20 MG/1
20 TABLET ORAL 2 TIMES DAILY
Qty: 180 TABLET | Refills: 1 | Status: SHIPPED | OUTPATIENT
Start: 2025-08-18

## 2025-08-18 SDOH — HEALTH STABILITY: PHYSICAL HEALTH: ON AVERAGE, HOW MANY DAYS PER WEEK DO YOU ENGAGE IN MODERATE TO STRENUOUS EXERCISE (LIKE A BRISK WALK)?: 6 DAYS

## 2025-08-18 SDOH — HEALTH STABILITY: PHYSICAL HEALTH: ON AVERAGE, HOW MANY MINUTES DO YOU ENGAGE IN EXERCISE AT THIS LEVEL?: 70 MIN

## 2025-08-18 ASSESSMENT — LIFESTYLE VARIABLES
HOW OFTEN DO YOU HAVE SIX OR MORE DRINKS ON ONE OCCASION: 1
HOW MANY STANDARD DRINKS CONTAINING ALCOHOL DO YOU HAVE ON A TYPICAL DAY: 1
HOW MANY STANDARD DRINKS CONTAINING ALCOHOL DO YOU HAVE ON A TYPICAL DAY: 1 OR 2
HOW OFTEN DO YOU HAVE A DRINK CONTAINING ALCOHOL: 2-4 TIMES A MONTH
HOW OFTEN DO YOU HAVE A DRINK CONTAINING ALCOHOL: 3

## 2025-08-18 ASSESSMENT — PATIENT HEALTH QUESTIONNAIRE - PHQ9
SUM OF ALL RESPONSES TO PHQ QUESTIONS 1-9: 0
SUM OF ALL RESPONSES TO PHQ QUESTIONS 1-9: 0
1. LITTLE INTEREST OR PLEASURE IN DOING THINGS: NOT AT ALL
2. FEELING DOWN, DEPRESSED OR HOPELESS: NOT AT ALL
SUM OF ALL RESPONSES TO PHQ QUESTIONS 1-9: 0
SUM OF ALL RESPONSES TO PHQ QUESTIONS 1-9: 0

## 2025-08-26 ENCOUNTER — PATIENT MESSAGE (OUTPATIENT)
Dept: PRIMARY CARE CLINIC | Age: 75
End: 2025-08-26

## (undated) DEVICE — PROBE ABLAT XL 90DEG ASPIR BPLR RF 1 PC ELECTRD ERGO HNDL

## (undated) DEVICE — PAD,ABDOMINAL,5"X9",ST,LF,25/BX: Brand: MEDLINE INDUSTRIES, INC.

## (undated) DEVICE — SOLUTION IV 1000ML 0.9% SOD CHL

## (undated) DEVICE — STOCKINETTE ORTH W9XL36IN COT 2 PLY HLLW FOR HANDLING LMB

## (undated) DEVICE — PAD,NON-ADHERENT,3X8,STERILE,LF,1/PK: Brand: MEDLINE

## (undated) DEVICE — GOWN,REINFRCE,POLY,ECLIPSE,SLV,3XLG: Brand: MEDLINE

## (undated) DEVICE — GARMENT,MEDLINE,DVT,INT,CALF,MED, GEN2: Brand: MEDLINE

## (undated) DEVICE — TUBING FLD MGMT Y DBL SPIK DUALWAVE

## (undated) DEVICE — 3M™ STERI-DRAPE™ U-DRAPE 1067 1067 5/BX 4BX/CS/CTN&#X20;: Brand: STERI-DRAPE™

## (undated) DEVICE — 1010 S-DRAPE TOWEL DRAPE 10/BX: Brand: STERI-DRAPE™

## (undated) DEVICE — TUBING PMP L6FT CONT WAVE EXTN

## (undated) DEVICE — PUDDLEVAC FLOOR SUCTION DEVICE: Brand: PUDDLEVAC

## (undated) DEVICE — SOLUTION IV IRRIG LACTATED RINGERS 3000ML 2B7487

## (undated) DEVICE — GLOVE SURG SZ 8 L12IN FNGR THK75MIL WHT LTX POLYMER BEAD

## (undated) DEVICE — TUBING PMP L16FT MAIN DISP FOR AR-6400 AR-6475

## (undated) DEVICE — COVER LT HNDL BLU PLAS

## (undated) DEVICE — CANNULA ARTHSCP L4CM DIA8MM PASSPRT BTTN

## (undated) DEVICE — TUBING, SUCTION, 1/4" X 12', STRAIGHT: Brand: MEDLINE

## (undated) DEVICE — BANDAGE COBAN 4 IN COMPR W4INXL5YD FOAM COHESIVE QUIK STK SELF ADH SFT

## (undated) DEVICE — SUTURE MCRYL SZ 4-0 L27IN ABSRB UD L19MM PS-2 1/2 CIR PRIM Y426H

## (undated) DEVICE — EYE PROTECTOR FOAM MEDICHOICE

## (undated) DEVICE — PLATE ES AD W 9FT CRD 2

## (undated) DEVICE — NEEDLE SPNL L3.5IN PNK HUB S STL REG WALL FIT STYL W/ QNCKE

## (undated) DEVICE — KIT SHLDR STBL MARCO FOR SPIDER LIMB POS

## (undated) DEVICE — BLADE SHV L13CM DIA5MM EXCALIBUR AGG COOLCUT

## (undated) DEVICE — GOWN,SIRUS,POLYRNF,BRTHSLV,XL,30/CS: Brand: MEDLINE

## (undated) DEVICE — DBD-PACK,SHOULDER III: Brand: MEDLINE

## (undated) DEVICE — SURGICAL SET UP - SURE SET: Brand: MEDLINE INDUSTRIES, INC.

## (undated) DEVICE — BUR SHAVER 5 MMX13 CM 8 FLUT OVL FOR AGGRESSIVE BNE COOLCUT

## (undated) DEVICE — CHLORAPREP 26ML ORANGE

## (undated) DEVICE — 3M™ IOBAN™ 2 ANTIMICROBIAL INCISE DRAPE 6640EZ: Brand: IOBAN™ 2

## (undated) DEVICE — DRAPE 70X60IN SPLIT IMPERV ADHES STRIP

## (undated) DEVICE — SUTURE PDS II SZ 1 L27IN ABSRB VLT CT-1 L36MM 1/2 CIR Z341H

## (undated) DEVICE — CANNULA ARTHSCP L7CM DIA7MM TRNSLUC THRD FLX W/ NO SQUIRT

## (undated) DEVICE — BLANKET WRM W40.2XL55.9IN IORT LO BODY + MISTRAL AIR

## (undated) DEVICE — GLOVE SURG SZ 8 L12IN FNGR THK79MIL GRN LTX FREE

## (undated) DEVICE — SUTURE FIBERWIRE SZ 2 W/ TAPERED NEEDLE BLUE L38IN NONABSORB BLU L26.5MM 1/2 CIRCLE AR7200

## (undated) DEVICE — GOWN,SIRUS,POLYRNF,BRTHSLV,XLN/XXL,18/CS: Brand: MEDLINE

## (undated) DEVICE — 3M™ STERI-DRAPE™ U-DRAPE 1015: Brand: STERI-DRAPE™

## (undated) DEVICE — INTENDED TO SUPPORT AND MAINTAIN THE POSITION OF AN ANESTHETIZED PATIENT DURING SURGERY: Brand: ERIN BEACH CHAIR FACE MASK

## (undated) DEVICE — JEWISH HOSPITAL TURNOVER KIT: Brand: MEDLINE INDUSTRIES, INC.

## (undated) DEVICE — PROTECTOR ULN NRV PUR FOAM HK LOOP STRP ANATOMICALLY

## (undated) DEVICE — STRAP SFTY W5XL72IN 1 PC

## (undated) DEVICE — SUTURE VCRL SZ 3-0 L27IN ABSRB UD L26MM CT-2 1/2 CIR J232H

## (undated) DEVICE — PAD DRY FLOOR ABS 32X58IN GRN

## (undated) DEVICE — SYSTEM SKIN CLSR 22CM DERMBND PRINEO

## (undated) DEVICE — YANKAUER,OPEN TIP,W/O VENT,STERILE: Brand: MEDLINE INDUSTRIES, INC.

## (undated) DEVICE — SPONGE GZ W4XL8IN COT WVN 12 PLY

## (undated) DEVICE — SURE SET-DOUBLE BASIN-LF: Brand: MEDLINE INDUSTRIES, INC.

## (undated) DEVICE — ANCHOR SUTURE BIOCOMP 4.75X19.1 MM SWIVELOCK C
Type: IMPLANTABLE DEVICE | Site: SHOULDER | Status: NON-FUNCTIONAL
Removed: 2020-01-27